# Patient Record
Sex: MALE | Race: WHITE | NOT HISPANIC OR LATINO | Employment: FULL TIME | URBAN - METROPOLITAN AREA
[De-identification: names, ages, dates, MRNs, and addresses within clinical notes are randomized per-mention and may not be internally consistent; named-entity substitution may affect disease eponyms.]

---

## 2017-01-02 ENCOUNTER — ALLSCRIPTS OFFICE VISIT (OUTPATIENT)
Dept: OTHER | Facility: OTHER | Age: 57
End: 2017-01-02

## 2017-02-13 ENCOUNTER — ALLSCRIPTS OFFICE VISIT (OUTPATIENT)
Dept: OTHER | Facility: OTHER | Age: 57
End: 2017-02-13

## 2017-02-13 DIAGNOSIS — M25.551 PAIN IN RIGHT HIP: ICD-10-CM

## 2017-02-14 ENCOUNTER — TRANSCRIBE ORDERS (OUTPATIENT)
Dept: ADMINISTRATIVE | Facility: HOSPITAL | Age: 57
End: 2017-02-14

## 2017-02-14 DIAGNOSIS — M25.551 RIGHT HIP PAIN: Primary | ICD-10-CM

## 2017-02-22 ENCOUNTER — HOSPITAL ENCOUNTER (OUTPATIENT)
Dept: RADIOLOGY | Facility: HOSPITAL | Age: 57
Discharge: HOME | End: 2017-02-22
Attending: RADIOLOGY | Admitting: RADIOLOGY
Payer: COMMERCIAL

## 2017-02-22 ENCOUNTER — HOSPITAL ENCOUNTER (OUTPATIENT)
Dept: RADIOLOGY | Facility: HOSPITAL | Age: 57
Discharge: HOME | End: 2017-02-22
Attending: ORTHOPAEDIC SURGERY
Payer: COMMERCIAL

## 2017-02-22 ENCOUNTER — HOSPITAL ENCOUNTER (OUTPATIENT)
Dept: MRI IMAGING | Facility: HOSPITAL | Age: 57
Discharge: HOME | End: 2017-02-22
Attending: ORTHOPAEDIC SURGERY
Payer: COMMERCIAL

## 2017-02-22 DIAGNOSIS — M25.551 PAIN IN RIGHT HIP: ICD-10-CM

## 2017-02-22 DIAGNOSIS — M25.551 RIGHT HIP PAIN: ICD-10-CM

## 2017-02-22 PROCEDURE — A9585 GADOBUTROL INJECTION: HCPCS | Performed by: ORTHOPAEDIC SURGERY

## 2017-02-22 PROCEDURE — 77002 NEEDLE LOCALIZATION BY XRAY: CPT

## 2017-02-22 PROCEDURE — 73722 MRI JOINT OF LWR EXTR W/DYE: CPT

## 2017-02-22 PROCEDURE — 20610 DRAIN/INJ JOINT/BURSA W/O US: CPT

## 2017-02-22 PROCEDURE — 70030 X-RAY EYE FOR FOREIGN BODY: CPT

## 2017-02-22 RX ADMIN — GADOBUTROL 0.2 ML: 604.72 INJECTION INTRAVENOUS at 14:40

## 2017-02-22 RX ADMIN — IOHEXOL 50 ML: 300 INJECTION, SOLUTION INTRAVENOUS at 14:40

## 2017-02-28 ENCOUNTER — ALLSCRIPTS OFFICE VISIT (OUTPATIENT)
Dept: OTHER | Facility: OTHER | Age: 57
End: 2017-02-28

## 2017-03-08 ENCOUNTER — ALLSCRIPTS OFFICE VISIT (OUTPATIENT)
Dept: OTHER | Facility: OTHER | Age: 57
End: 2017-03-08

## 2017-03-21 ENCOUNTER — ALLSCRIPTS OFFICE VISIT (OUTPATIENT)
Dept: OTHER | Facility: OTHER | Age: 57
End: 2017-03-21

## 2017-04-17 ENCOUNTER — GENERIC CONVERSION - ENCOUNTER (OUTPATIENT)
Dept: OTHER | Facility: OTHER | Age: 57
End: 2017-04-17

## 2017-04-18 ENCOUNTER — GENERIC CONVERSION - ENCOUNTER (OUTPATIENT)
Dept: OTHER | Facility: OTHER | Age: 57
End: 2017-04-18

## 2017-05-02 ENCOUNTER — TRANSCRIBE ORDERS (OUTPATIENT)
Dept: ADMINISTRATIVE | Facility: HOSPITAL | Age: 57
End: 2017-05-02

## 2017-05-02 ENCOUNTER — APPOINTMENT (OUTPATIENT)
Dept: LAB | Facility: HOSPITAL | Age: 57
End: 2017-05-02
Attending: ORTHOPAEDIC SURGERY
Payer: COMMERCIAL

## 2017-05-02 ENCOUNTER — ANESTHESIA EVENT (OUTPATIENT)
Dept: PERIOP | Facility: HOSPITAL | Age: 57
DRG: 470 | End: 2017-05-02
Payer: COMMERCIAL

## 2017-05-02 ENCOUNTER — HOSPITAL ENCOUNTER (OUTPATIENT)
Dept: NON INVASIVE DIAGNOSTICS | Facility: HOSPITAL | Age: 57
Discharge: HOME | End: 2017-05-02
Attending: ORTHOPAEDIC SURGERY
Payer: COMMERCIAL

## 2017-05-02 ENCOUNTER — HOSPITAL ENCOUNTER (OUTPATIENT)
Dept: RADIOLOGY | Facility: HOSPITAL | Age: 57
Discharge: HOME | End: 2017-05-02
Attending: ORTHOPAEDIC SURGERY
Payer: COMMERCIAL

## 2017-05-02 DIAGNOSIS — M16.11 PRIMARY OSTEOARTHRITIS OF RIGHT HIP: Primary | ICD-10-CM

## 2017-05-02 DIAGNOSIS — M16.11 PRIMARY OSTEOARTHRITIS OF RIGHT HIP: ICD-10-CM

## 2017-05-02 LAB
ABO GROUP BLD: NORMAL
ALBUMIN SERPL BCP-MCNC: 4.2 G/DL (ref 3.5–5)
ALP SERPL-CCNC: 57 U/L (ref 46–116)
ALT SERPL W P-5'-P-CCNC: 44 U/L (ref 12–78)
ANION GAP SERPL CALCULATED.3IONS-SCNC: 7 MMOL/L (ref 4–13)
AST SERPL W P-5'-P-CCNC: 22 U/L (ref 5–45)
ATRIAL RATE: 84 BPM
BASOPHILS # BLD AUTO: 0.02 THOUSANDS/ΜL (ref 0–0.1)
BASOPHILS NFR BLD AUTO: 0 % (ref 0–1)
BILIRUB SERPL-MCNC: 0.64 MG/DL (ref 0.2–1)
BILIRUB UR QL STRIP: NEGATIVE
BLD GP AB SCN SERPL QL: NEGATIVE
BUN SERPL-MCNC: 17 MG/DL (ref 5–25)
CALCIUM SERPL-MCNC: 8.9 MG/DL (ref 8.3–10.1)
CHLORIDE SERPL-SCNC: 104 MMOL/L (ref 100–108)
CLARITY UR: CLEAR
CO2 SERPL-SCNC: 29 MMOL/L (ref 21–32)
COLOR UR: YELLOW
CREAT SERPL-MCNC: 1.04 MG/DL (ref 0.6–1.3)
EOSINOPHIL # BLD AUTO: 0.21 THOUSAND/ΜL (ref 0–0.61)
EOSINOPHIL NFR BLD AUTO: 4 % (ref 0–6)
ERYTHROCYTE [DISTWIDTH] IN BLOOD BY AUTOMATED COUNT: 12.6 % (ref 11.6–15.1)
GFR SERPL CREATININE-BSD FRML MDRD: >60 ML/MIN/1.73SQ M
GLUCOSE SERPL-MCNC: 118 MG/DL (ref 65–140)
GLUCOSE UR STRIP-MCNC: NEGATIVE MG/DL
HCT VFR BLD AUTO: 42.1 % (ref 36.5–49.3)
HGB BLD-MCNC: 14.6 G/DL (ref 12–17)
HGB UR QL STRIP.AUTO: NEGATIVE
INR PPP: 1.06 (ref 0.86–1.16)
KETONES UR STRIP-MCNC: NEGATIVE MG/DL
LEUKOCYTE ESTERASE UR QL STRIP: NEGATIVE
LYMPHOCYTES # BLD AUTO: 1.6 THOUSANDS/ΜL (ref 0.6–4.47)
LYMPHOCYTES NFR BLD AUTO: 29 % (ref 14–44)
MCH RBC QN AUTO: 32.3 PG (ref 26.8–34.3)
MCHC RBC AUTO-ENTMCNC: 34.7 G/DL (ref 31.4–37.4)
MCV RBC AUTO: 93 FL (ref 82–98)
MONOCYTES # BLD AUTO: 0.45 THOUSAND/ΜL (ref 0.17–1.22)
MONOCYTES NFR BLD AUTO: 8 % (ref 4–12)
NEUTROPHILS # BLD AUTO: 3.23 THOUSANDS/ΜL (ref 1.85–7.62)
NEUTS SEG NFR BLD AUTO: 59 % (ref 43–75)
NITRITE UR QL STRIP: NEGATIVE
NRBC BLD AUTO-RTO: 0 /100 WBCS
P AXIS: 70 DEGREES
PH UR STRIP.AUTO: 7.5 [PH] (ref 4.5–8)
PLATELET # BLD AUTO: 309 THOUSANDS/UL (ref 149–390)
PMV BLD AUTO: 9.2 FL (ref 8.9–12.7)
POTASSIUM SERPL-SCNC: 3.9 MMOL/L (ref 3.5–5.3)
PR INTERVAL: 162 MS
PROT SERPL-MCNC: 7.6 G/DL (ref 6.4–8.2)
PROT UR STRIP-MCNC: NEGATIVE MG/DL
PROTHROMBIN TIME: 13.8 SECONDS (ref 12.1–14.4)
QRS AXIS: -45 DEGREES
QRSD INTERVAL: 92 MS
QT INTERVAL: 356 MS
QTC INTERVAL: 420 MS
RBC # BLD AUTO: 4.52 MILLION/UL (ref 3.88–5.62)
RH BLD: POSITIVE
SODIUM SERPL-SCNC: 140 MMOL/L (ref 136–145)
SP GR UR STRIP.AUTO: 1.01 (ref 1–1.03)
SPECIMEN EXPIRATION DATE: NORMAL
T WAVE AXIS: 12 DEGREES
UROBILINOGEN UR QL STRIP.AUTO: 0.2 E.U./DL
VENTRICULAR RATE: 84 BPM
WBC # BLD AUTO: 5.51 THOUSAND/UL (ref 4.31–10.16)

## 2017-05-02 PROCEDURE — 86850 RBC ANTIBODY SCREEN: CPT

## 2017-05-02 PROCEDURE — 86900 BLOOD TYPING SEROLOGIC ABO: CPT

## 2017-05-02 PROCEDURE — 71020 HB CHEST X-RAY 2VW FRONTAL&LATL: CPT

## 2017-05-02 PROCEDURE — 80053 COMPREHEN METABOLIC PANEL: CPT

## 2017-05-02 PROCEDURE — 93005 ELECTROCARDIOGRAM TRACING: CPT

## 2017-05-02 PROCEDURE — 86901 BLOOD TYPING SEROLOGIC RH(D): CPT

## 2017-05-02 PROCEDURE — 81003 URINALYSIS AUTO W/O SCOPE: CPT | Performed by: ORTHOPAEDIC SURGERY

## 2017-05-02 PROCEDURE — 85610 PROTHROMBIN TIME: CPT

## 2017-05-02 PROCEDURE — 85025 COMPLETE CBC W/AUTO DIFF WBC: CPT

## 2017-05-02 PROCEDURE — 36415 COLL VENOUS BLD VENIPUNCTURE: CPT

## 2017-05-04 ENCOUNTER — ALLSCRIPTS OFFICE VISIT (OUTPATIENT)
Dept: OTHER | Facility: OTHER | Age: 57
End: 2017-05-04

## 2017-05-17 ENCOUNTER — ANESTHESIA (OUTPATIENT)
Dept: PERIOP | Facility: HOSPITAL | Age: 57
DRG: 470 | End: 2017-05-17
Payer: COMMERCIAL

## 2017-05-17 ENCOUNTER — HOSPITAL ENCOUNTER (OUTPATIENT)
Dept: RADIOLOGY | Facility: HOSPITAL | Age: 57
Setting detail: SURGERY ADMIT
Discharge: HOME | DRG: 470 | End: 2017-05-17
Payer: COMMERCIAL

## 2017-05-17 ENCOUNTER — APPOINTMENT (INPATIENT)
Dept: RADIOLOGY | Facility: HOSPITAL | Age: 57
DRG: 470 | End: 2017-05-17
Payer: COMMERCIAL

## 2017-05-17 ENCOUNTER — HOSPITAL ENCOUNTER (INPATIENT)
Facility: HOSPITAL | Age: 57
LOS: 1 days | Discharge: HOME HEALTH | DRG: 470 | End: 2017-05-18
Attending: ORTHOPAEDIC SURGERY | Admitting: ORTHOPAEDIC SURGERY
Payer: COMMERCIAL

## 2017-05-17 DIAGNOSIS — M16.11 PRIMARY OSTEOARTHRITIS OF RIGHT HIP: ICD-10-CM

## 2017-05-17 DIAGNOSIS — M16.9 OSTEOARTHROSIS, HIP: Primary | ICD-10-CM

## 2017-05-17 LAB
ABO GROUP BLD: NORMAL
BLD GP AB SCN SERPL QL: NEGATIVE
RH BLD: POSITIVE
SPECIMEN EXPIRATION DATE: NORMAL

## 2017-05-17 PROCEDURE — C1776 JOINT DEVICE (IMPLANTABLE): HCPCS | Performed by: ORTHOPAEDIC SURGERY

## 2017-05-17 PROCEDURE — 86901 BLOOD TYPING SEROLOGIC RH(D): CPT | Performed by: ORTHOPAEDIC SURGERY

## 2017-05-17 PROCEDURE — C1713 ANCHOR/SCREW BN/BN,TIS/BN: HCPCS | Performed by: ORTHOPAEDIC SURGERY

## 2017-05-17 PROCEDURE — 73501 X-RAY EXAM HIP UNI 1 VIEW: CPT

## 2017-05-17 PROCEDURE — 97163 PT EVAL HIGH COMPLEX 45 MIN: CPT

## 2017-05-17 PROCEDURE — 0SR902Z REPLACEMENT OF RIGHT HIP JOINT WITH METAL ON POLYETHYLENE SYNTHETIC SUBSTITUTE, OPEN APPROACH: ICD-10-PCS | Performed by: ORTHOPAEDIC SURGERY

## 2017-05-17 PROCEDURE — 97110 THERAPEUTIC EXERCISES: CPT

## 2017-05-17 PROCEDURE — A9270 NON-COVERED ITEM OR SERVICE: HCPCS | Performed by: PHYSICIAN ASSISTANT

## 2017-05-17 PROCEDURE — 86850 RBC ANTIBODY SCREEN: CPT | Performed by: ORTHOPAEDIC SURGERY

## 2017-05-17 PROCEDURE — 94762 N-INVAS EAR/PLS OXIMTRY CONT: CPT

## 2017-05-17 PROCEDURE — G8979 MOBILITY GOAL STATUS: HCPCS

## 2017-05-17 PROCEDURE — G8978 MOBILITY CURRENT STATUS: HCPCS

## 2017-05-17 PROCEDURE — 86900 BLOOD TYPING SEROLOGIC ABO: CPT | Performed by: ORTHOPAEDIC SURGERY

## 2017-05-17 DEVICE — PINNACLE POROCOAT ACETABULAR SHELL SECTOR II 54MM OD
Type: IMPLANTABLE DEVICE | Site: HIP | Status: FUNCTIONAL
Brand: PINNACLE POROCOAT

## 2017-05-17 DEVICE — PINNACLE HIP SOLUTIONS ALTRX POLYETHYLENE ACETABULAR LINER NEUTRAL 36MM ID 54MM OD
Type: IMPLANTABLE DEVICE | Site: HIP | Status: FUNCTIONAL
Brand: PINNACLE ALTRX

## 2017-05-17 DEVICE — PINNACLE CANCELLOUS BONE SCREW 6.5MM X 20MM
Type: IMPLANTABLE DEVICE | Site: HIP | Status: FUNCTIONAL
Brand: PINNACLE

## 2017-05-17 DEVICE — CORAIL HIP SYSTEM CEMENTLESS FEMORAL STEM 12/14 AMT 135 DEGREES KA SIZE 12 HA COATED STANDARD COLLAR
Type: IMPLANTABLE DEVICE | Site: HIP | Status: FUNCTIONAL
Brand: CORAIL

## 2017-05-17 DEVICE — BIOLOX DELTA CERAMIC FEMORAL HEAD +1.5 36MM DIA 12/14 TAPER
Type: IMPLANTABLE DEVICE | Site: HIP | Status: FUNCTIONAL
Brand: BIOLOX DELTA

## 2017-05-17 RX ORDER — MORPHINE SULFATE 0.5 MG/ML
INJECTION, SOLUTION EPIDURAL; INTRATHECAL; INTRAVENOUS AS NEEDED
Status: DISCONTINUED | OUTPATIENT
Start: 2017-05-17 | End: 2017-05-17 | Stop reason: SURG

## 2017-05-17 RX ORDER — ALBUTEROL SULFATE 2.5 MG/3ML
2.5 SOLUTION RESPIRATORY (INHALATION) ONCE AS NEEDED
Status: DISCONTINUED | OUTPATIENT
Start: 2017-05-17 | End: 2017-05-17 | Stop reason: HOSPADM

## 2017-05-17 RX ORDER — DIPHENHYDRAMINE HYDROCHLORIDE 50 MG/ML
25 INJECTION INTRAMUSCULAR; INTRAVENOUS EVERY 6 HOURS PRN
Status: ACTIVE | OUTPATIENT
Start: 2017-05-17 | End: 2017-05-18

## 2017-05-17 RX ORDER — SENNOSIDES 8.6 MG
1 TABLET ORAL DAILY
Status: DISCONTINUED | OUTPATIENT
Start: 2017-05-17 | End: 2017-05-18 | Stop reason: HOSPADM

## 2017-05-17 RX ORDER — LOSARTAN POTASSIUM 50 MG/1
100 TABLET ORAL DAILY
Status: DISCONTINUED | OUTPATIENT
Start: 2017-05-18 | End: 2017-05-18 | Stop reason: HOSPADM

## 2017-05-17 RX ORDER — PANTOPRAZOLE SODIUM 40 MG/1
40 TABLET, DELAYED RELEASE ORAL
Status: DISCONTINUED | OUTPATIENT
Start: 2017-05-18 | End: 2017-05-18 | Stop reason: HOSPADM

## 2017-05-17 RX ORDER — KETOROLAC TROMETHAMINE 30 MG/ML
30 INJECTION, SOLUTION INTRAMUSCULAR; INTRAVENOUS EVERY 6 HOURS PRN
Status: DISPENSED | OUTPATIENT
Start: 2017-05-17 | End: 2017-05-18

## 2017-05-17 RX ORDER — ONDANSETRON 2 MG/ML
INJECTION INTRAMUSCULAR; INTRAVENOUS AS NEEDED
Status: DISCONTINUED | OUTPATIENT
Start: 2017-05-17 | End: 2017-05-17 | Stop reason: SURG

## 2017-05-17 RX ORDER — FENTANYL CITRATE/PF 50 MCG/ML
50 SYRINGE (ML) INJECTION
Status: DISCONTINUED | OUTPATIENT
Start: 2017-05-17 | End: 2017-05-17 | Stop reason: HOSPADM

## 2017-05-17 RX ORDER — ASPIRIN 81 MG/1
81 TABLET, CHEWABLE ORAL 2 TIMES DAILY
Status: DISCONTINUED | OUTPATIENT
Start: 2017-05-17 | End: 2017-05-18 | Stop reason: HOSPADM

## 2017-05-17 RX ORDER — ONDANSETRON 2 MG/ML
4 INJECTION INTRAMUSCULAR; INTRAVENOUS EVERY 4 HOURS PRN
Status: ACTIVE | OUTPATIENT
Start: 2017-05-17 | End: 2017-05-18

## 2017-05-17 RX ORDER — TRANEXAMIC ACID 100 MG/ML
INJECTION, SOLUTION INTRAVENOUS AS NEEDED
Status: DISCONTINUED | OUTPATIENT
Start: 2017-05-17 | End: 2017-05-17 | Stop reason: SURG

## 2017-05-17 RX ORDER — TRAMADOL HYDROCHLORIDE 50 MG/1
50 TABLET ORAL EVERY 6 HOURS PRN
Status: DISCONTINUED | OUTPATIENT
Start: 2017-05-17 | End: 2017-05-18 | Stop reason: HOSPADM

## 2017-05-17 RX ORDER — SODIUM CHLORIDE, SODIUM LACTATE, POTASSIUM CHLORIDE, CALCIUM CHLORIDE 600; 310; 30; 20 MG/100ML; MG/100ML; MG/100ML; MG/100ML
125 INJECTION, SOLUTION INTRAVENOUS CONTINUOUS
Status: DISCONTINUED | OUTPATIENT
Start: 2017-05-17 | End: 2017-05-18 | Stop reason: HOSPADM

## 2017-05-17 RX ORDER — METOCLOPRAMIDE HYDROCHLORIDE 5 MG/ML
5 INJECTION INTRAMUSCULAR; INTRAVENOUS EVERY 6 HOURS PRN
Status: ACTIVE | OUTPATIENT
Start: 2017-05-17 | End: 2017-05-18

## 2017-05-17 RX ORDER — BUPIVACAINE HYDROCHLORIDE 7.5 MG/ML
INJECTION, SOLUTION INTRASPINAL AS NEEDED
Status: DISCONTINUED | OUTPATIENT
Start: 2017-05-17 | End: 2017-05-17 | Stop reason: SURG

## 2017-05-17 RX ORDER — CALCIUM CARBONATE 200(500)MG
1000 TABLET,CHEWABLE ORAL DAILY PRN
Status: DISCONTINUED | OUTPATIENT
Start: 2017-05-17 | End: 2017-05-18 | Stop reason: HOSPADM

## 2017-05-17 RX ORDER — MIDAZOLAM HYDROCHLORIDE 1 MG/ML
INJECTION INTRAMUSCULAR; INTRAVENOUS AS NEEDED
Status: DISCONTINUED | OUTPATIENT
Start: 2017-05-17 | End: 2017-05-17 | Stop reason: SURG

## 2017-05-17 RX ORDER — DOCUSATE SODIUM 100 MG/1
100 CAPSULE, LIQUID FILLED ORAL 2 TIMES DAILY
Status: DISCONTINUED | OUTPATIENT
Start: 2017-05-17 | End: 2017-05-18 | Stop reason: HOSPADM

## 2017-05-17 RX ORDER — PROPOFOL 10 MG/ML
INJECTION, EMULSION INTRAVENOUS CONTINUOUS PRN
Status: DISCONTINUED | OUTPATIENT
Start: 2017-05-17 | End: 2017-05-17 | Stop reason: SURG

## 2017-05-17 RX ORDER — AMLODIPINE BESYLATE 5 MG/1
5 TABLET ORAL DAILY
Status: DISCONTINUED | OUTPATIENT
Start: 2017-05-18 | End: 2017-05-18 | Stop reason: HOSPADM

## 2017-05-17 RX ORDER — PANTOPRAZOLE SODIUM 40 MG/1
40 TABLET, DELAYED RELEASE ORAL
Status: DISCONTINUED | OUTPATIENT
Start: 2017-05-18 | End: 2017-05-17 | Stop reason: SDUPTHER

## 2017-05-17 RX ORDER — MAGNESIUM HYDROXIDE 1200 MG/15ML
LIQUID ORAL AS NEEDED
Status: DISCONTINUED | OUTPATIENT
Start: 2017-05-17 | End: 2017-05-17 | Stop reason: HOSPADM

## 2017-05-17 RX ORDER — NALBUPHINE HCL 10 MG/ML
5 AMPUL (ML) INJECTION
Status: ACTIVE | OUTPATIENT
Start: 2017-05-17 | End: 2017-05-18

## 2017-05-17 RX ORDER — ACETAMINOPHEN 325 MG/1
650 TABLET ORAL EVERY 6 HOURS PRN
Status: DISCONTINUED | OUTPATIENT
Start: 2017-05-17 | End: 2017-05-18 | Stop reason: HOSPADM

## 2017-05-17 RX ORDER — ONDANSETRON 2 MG/ML
4 INJECTION INTRAMUSCULAR; INTRAVENOUS EVERY 6 HOURS PRN
Status: DISCONTINUED | OUTPATIENT
Start: 2017-05-18 | End: 2017-05-18 | Stop reason: HOSPADM

## 2017-05-17 RX ORDER — OXYCODONE HYDROCHLORIDE 10 MG/1
10 TABLET ORAL EVERY 4 HOURS PRN
Status: DISCONTINUED | OUTPATIENT
Start: 2017-05-18 | End: 2017-05-18 | Stop reason: HOSPADM

## 2017-05-17 RX ORDER — AMLODIPINE BESYLATE 5 MG/1
5 TABLET ORAL DAILY
COMMUNITY
End: 2017-11-15

## 2017-05-17 RX ORDER — SODIUM CHLORIDE 9 MG/ML
125 INJECTION, SOLUTION INTRAVENOUS CONTINUOUS
Status: DISCONTINUED | OUTPATIENT
Start: 2017-05-17 | End: 2017-05-18 | Stop reason: HOSPADM

## 2017-05-17 RX ORDER — CELECOXIB 100 MG/1
100 CAPSULE ORAL 2 TIMES DAILY
Status: DISCONTINUED | OUTPATIENT
Start: 2017-05-17 | End: 2017-05-18 | Stop reason: HOSPADM

## 2017-05-17 RX ORDER — FENTANYL CITRATE 50 UG/ML
INJECTION, SOLUTION INTRAMUSCULAR; INTRAVENOUS AS NEEDED
Status: DISCONTINUED | OUTPATIENT
Start: 2017-05-17 | End: 2017-05-17 | Stop reason: SURG

## 2017-05-17 RX ORDER — VANCOMYCIN HYDROCHLORIDE 1 G/200ML
1000 INJECTION, SOLUTION INTRAVENOUS
Status: DISCONTINUED | OUTPATIENT
Start: 2017-05-17 | End: 2017-05-17 | Stop reason: HOSPADM

## 2017-05-17 RX ORDER — MEPERIDINE HYDROCHLORIDE 50 MG/ML
12.5 INJECTION INTRAMUSCULAR; INTRAVENOUS; SUBCUTANEOUS AS NEEDED
Status: DISCONTINUED | OUTPATIENT
Start: 2017-05-17 | End: 2017-05-17 | Stop reason: HOSPADM

## 2017-05-17 RX ORDER — PROPOFOL 10 MG/ML
INJECTION, EMULSION INTRAVENOUS AS NEEDED
Status: DISCONTINUED | OUTPATIENT
Start: 2017-05-17 | End: 2017-05-17 | Stop reason: SURG

## 2017-05-17 RX ORDER — OXYCODONE HYDROCHLORIDE 5 MG/1
5 TABLET ORAL EVERY 4 HOURS PRN
Status: DISCONTINUED | OUTPATIENT
Start: 2017-05-18 | End: 2017-05-18 | Stop reason: HOSPADM

## 2017-05-17 RX ADMIN — CEFAZOLIN SODIUM 1000 MG: 1 SOLUTION INTRAVENOUS at 19:30

## 2017-05-17 RX ADMIN — ASPIRIN 81 MG: 81 TABLET, CHEWABLE ORAL at 17:49

## 2017-05-17 RX ADMIN — MORPHINE SULFATE 0.15 MG: 0.5 INJECTION, SOLUTION EPIDURAL; INTRATHECAL; INTRAVENOUS at 10:56

## 2017-05-17 RX ADMIN — CELECOXIB 100 MG: 100 CAPSULE ORAL at 17:49

## 2017-05-17 RX ADMIN — SODIUM CHLORIDE: 0.9 INJECTION, SOLUTION INTRAVENOUS at 11:50

## 2017-05-17 RX ADMIN — VANCOMYCIN HYDROCHLORIDE 1000 MG: 1 INJECTION, SOLUTION INTRAVENOUS at 11:05

## 2017-05-17 RX ADMIN — SODIUM CHLORIDE, SODIUM LACTATE, POTASSIUM CHLORIDE, AND CALCIUM CHLORIDE 125 ML/HR: .6; .31; .03; .02 INJECTION, SOLUTION INTRAVENOUS at 22:40

## 2017-05-17 RX ADMIN — TRANEXAMIC ACID 1000 MG: 100 INJECTION, SOLUTION INTRAVENOUS at 12:09

## 2017-05-17 RX ADMIN — PROPOFOL 100 MCG/KG/MIN: 10 INJECTION, EMULSION INTRAVENOUS at 11:15

## 2017-05-17 RX ADMIN — FENTANYL CITRATE 100 MCG: 50 INJECTION, SOLUTION INTRAMUSCULAR; INTRAVENOUS at 10:46

## 2017-05-17 RX ADMIN — SODIUM CHLORIDE, SODIUM LACTATE, POTASSIUM CHLORIDE, AND CALCIUM CHLORIDE 125 ML/HR: .6; .31; .03; .02 INJECTION, SOLUTION INTRAVENOUS at 13:22

## 2017-05-17 RX ADMIN — CEFAZOLIN SODIUM 2000 MG: 2 SOLUTION INTRAVENOUS at 10:44

## 2017-05-17 RX ADMIN — MIDAZOLAM HYDROCHLORIDE 2 MG: 1 INJECTION, SOLUTION INTRAMUSCULAR; INTRAVENOUS at 11:01

## 2017-05-17 RX ADMIN — TRANEXAMIC ACID 1000 MG: 100 INJECTION, SOLUTION INTRAVENOUS at 11:10

## 2017-05-17 RX ADMIN — ONDANSETRON HYDROCHLORIDE 4 MG: 2 INJECTION, SOLUTION INTRAVENOUS at 12:36

## 2017-05-17 RX ADMIN — SODIUM CHLORIDE 125 ML/HR: 0.9 INJECTION, SOLUTION INTRAVENOUS at 08:45

## 2017-05-17 RX ADMIN — TRAMADOL HYDROCHLORIDE 50 MG: 50 TABLET, COATED ORAL at 22:40

## 2017-05-17 RX ADMIN — DEXAMETHASONE SODIUM PHOSPHATE 4 MG: 10 INJECTION INTRAMUSCULAR; INTRAVENOUS at 11:41

## 2017-05-17 RX ADMIN — MIDAZOLAM HYDROCHLORIDE 2 MG: 1 INJECTION, SOLUTION INTRAMUSCULAR; INTRAVENOUS at 10:44

## 2017-05-17 RX ADMIN — BUPIVACAINE HYDROCHLORIDE IN DEXTROSE 1.8 ML: 7.5 INJECTION, SOLUTION SUBARACHNOID at 10:56

## 2017-05-17 RX ADMIN — PROPOFOL 30 MG: 10 INJECTION, EMULSION INTRAVENOUS at 11:17

## 2017-05-17 RX ADMIN — DOCUSATE SODIUM 100 MG: 100 CAPSULE, LIQUID FILLED ORAL at 17:49

## 2017-05-18 VITALS
BODY MASS INDEX: 24.79 KG/M2 | SYSTOLIC BLOOD PRESSURE: 143 MMHG | TEMPERATURE: 97.4 F | OXYGEN SATURATION: 98 % | HEART RATE: 81 BPM | WEIGHT: 173.19 LBS | DIASTOLIC BLOOD PRESSURE: 89 MMHG | RESPIRATION RATE: 16 BRPM | HEIGHT: 70 IN

## 2017-05-18 LAB
ERYTHROCYTE [DISTWIDTH] IN BLOOD BY AUTOMATED COUNT: 12.4 % (ref 11.6–15.1)
HCT VFR BLD AUTO: 37.3 % (ref 36.5–49.3)
HGB BLD-MCNC: 13 G/DL (ref 12–17)
MCH RBC QN AUTO: 31.9 PG (ref 26.8–34.3)
MCHC RBC AUTO-ENTMCNC: 34.9 G/DL (ref 31.4–37.4)
MCV RBC AUTO: 92 FL (ref 82–98)
PLATELET # BLD AUTO: 304 THOUSANDS/UL (ref 149–390)
PMV BLD AUTO: 9.4 FL (ref 8.9–12.7)
RBC # BLD AUTO: 4.07 MILLION/UL (ref 3.88–5.62)
WBC # BLD AUTO: 14.85 THOUSAND/UL (ref 4.31–10.16)

## 2017-05-18 PROCEDURE — A9270 NON-COVERED ITEM OR SERVICE: HCPCS | Performed by: PHYSICIAN ASSISTANT

## 2017-05-18 PROCEDURE — A9270 NON-COVERED ITEM OR SERVICE: HCPCS | Performed by: INTERNAL MEDICINE

## 2017-05-18 PROCEDURE — 97110 THERAPEUTIC EXERCISES: CPT

## 2017-05-18 PROCEDURE — 97530 THERAPEUTIC ACTIVITIES: CPT

## 2017-05-18 PROCEDURE — 97116 GAIT TRAINING THERAPY: CPT

## 2017-05-18 PROCEDURE — 85027 COMPLETE CBC AUTOMATED: CPT | Performed by: INTERNAL MEDICINE

## 2017-05-18 RX ORDER — ASPIRIN 81 MG/1
81 TABLET, CHEWABLE ORAL 2 TIMES DAILY
Qty: 60 TABLET | Refills: 0 | Status: SHIPPED | OUTPATIENT
Start: 2017-05-18 | End: 2017-11-15

## 2017-05-18 RX ORDER — CELECOXIB 100 MG/1
100 CAPSULE ORAL 2 TIMES DAILY
Qty: 60 CAPSULE | Refills: 0 | Status: SHIPPED | OUTPATIENT
Start: 2017-05-18 | End: 2017-11-15

## 2017-05-18 RX ORDER — OXYCODONE HYDROCHLORIDE 5 MG/1
5 TABLET ORAL EVERY 4 HOURS PRN
Qty: 30 TABLET | Refills: 0 | Status: SHIPPED | OUTPATIENT
Start: 2017-05-18 | End: 2017-05-28

## 2017-05-18 RX ORDER — OXYCODONE HYDROCHLORIDE 10 MG/1
10 TABLET ORAL EVERY 4 HOURS PRN
Qty: 60 TABLET | Refills: 0 | Status: SHIPPED | OUTPATIENT
Start: 2017-05-18 | End: 2017-05-28

## 2017-05-18 RX ADMIN — CEFAZOLIN SODIUM 1000 MG: 1 SOLUTION INTRAVENOUS at 02:10

## 2017-05-18 RX ADMIN — LOSARTAN POTASSIUM 100 MG: 50 TABLET, FILM COATED ORAL at 09:00

## 2017-05-18 RX ADMIN — KETOROLAC TROMETHAMINE 30 MG: 30 INJECTION, SOLUTION INTRAMUSCULAR at 09:40

## 2017-05-18 RX ADMIN — PANTOPRAZOLE SODIUM 40 MG: 40 TABLET, DELAYED RELEASE ORAL at 06:05

## 2017-05-18 RX ADMIN — AMLODIPINE BESYLATE 5 MG: 5 TABLET ORAL at 09:00

## 2017-05-18 RX ADMIN — KETOROLAC TROMETHAMINE 30 MG: 30 INJECTION, SOLUTION INTRAMUSCULAR at 02:10

## 2017-05-18 RX ADMIN — OXYCODONE HYDROCHLORIDE 10 MG: 10 TABLET ORAL at 16:07

## 2017-05-18 RX ADMIN — ASPIRIN 81 MG: 81 TABLET, CHEWABLE ORAL at 09:00

## 2017-05-18 RX ADMIN — Medication 1 TABLET: at 08:59

## 2017-05-18 RX ADMIN — SENNOSIDES 8.6 MG: 8.6 TABLET, FILM COATED ORAL at 08:59

## 2017-05-18 RX ADMIN — CELECOXIB 100 MG: 100 CAPSULE ORAL at 09:00

## 2017-05-18 RX ADMIN — DOCUSATE SODIUM 100 MG: 100 CAPSULE, LIQUID FILLED ORAL at 09:00

## 2017-06-07 ENCOUNTER — GENERIC CONVERSION - ENCOUNTER (OUTPATIENT)
Dept: OTHER | Facility: OTHER | Age: 57
End: 2017-06-07

## 2017-07-03 ENCOUNTER — GENERIC CONVERSION - ENCOUNTER (OUTPATIENT)
Dept: OTHER | Facility: OTHER | Age: 57
End: 2017-07-03

## 2017-08-08 DIAGNOSIS — M54.30 SCIATICA: ICD-10-CM

## 2017-08-08 DIAGNOSIS — M25.551 PAIN IN RIGHT HIP: ICD-10-CM

## 2017-08-08 DIAGNOSIS — M16.11 PRIMARY OSTEOARTHRITIS OF RIGHT HIP: ICD-10-CM

## 2017-08-11 ENCOUNTER — GENERIC CONVERSION - ENCOUNTER (OUTPATIENT)
Dept: OTHER | Facility: OTHER | Age: 57
End: 2017-08-11

## 2017-08-17 ENCOUNTER — TRANSCRIBE ORDERS (OUTPATIENT)
Dept: ADMINISTRATIVE | Facility: HOSPITAL | Age: 57
End: 2017-08-17

## 2017-08-17 DIAGNOSIS — M16.12 PRIMARY OSTEOARTHRITIS OF LEFT HIP: Primary | ICD-10-CM

## 2017-09-12 ENCOUNTER — GENERIC CONVERSION - ENCOUNTER (OUTPATIENT)
Dept: OTHER | Facility: OTHER | Age: 57
End: 2017-09-12

## 2017-10-24 ENCOUNTER — LAB REQUISITION (OUTPATIENT)
Dept: LAB | Facility: HOSPITAL | Age: 57
End: 2017-10-24
Payer: COMMERCIAL

## 2017-10-24 ENCOUNTER — ALLSCRIPTS OFFICE VISIT (OUTPATIENT)
Dept: OTHER | Facility: OTHER | Age: 57
End: 2017-10-24

## 2017-10-24 DIAGNOSIS — M54.50 LOW BACK PAIN: ICD-10-CM

## 2017-10-24 DIAGNOSIS — M16.11 PRIMARY OSTEOARTHRITIS OF RIGHT HIP: ICD-10-CM

## 2017-10-24 DIAGNOSIS — M16.12 PRIMARY OSTEOARTHRITIS OF LEFT HIP: ICD-10-CM

## 2017-10-24 DIAGNOSIS — M25.50 PAIN IN JOINT: ICD-10-CM

## 2017-10-24 PROCEDURE — 86618 LYME DISEASE ANTIBODY: CPT | Performed by: FAMILY MEDICINE

## 2017-10-24 PROCEDURE — 86038 ANTINUCLEAR ANTIBODIES: CPT | Performed by: FAMILY MEDICINE

## 2017-10-24 PROCEDURE — 86140 C-REACTIVE PROTEIN: CPT | Performed by: FAMILY MEDICINE

## 2017-10-24 PROCEDURE — 85652 RBC SED RATE AUTOMATED: CPT | Performed by: FAMILY MEDICINE

## 2017-10-24 PROCEDURE — 86430 RHEUMATOID FACTOR TEST QUAL: CPT | Performed by: FAMILY MEDICINE

## 2017-10-25 LAB
CRP SERPL QL: <3 MG/L
ERYTHROCYTE [SEDIMENTATION RATE] IN BLOOD: 8 MM/HOUR (ref 0–10)
RHEUMATOID FACT SER QL LA: NEGATIVE

## 2017-10-26 LAB
B BURGDOR IGG SER IA-ACNC: 0.3
B BURGDOR IGM SER IA-ACNC: 0.13

## 2017-10-26 NOTE — PROGRESS NOTES
Assessment  1  Diffuse arthralgia (719 40) (M25 50)    Plan  Chronic low back pain, Diffuse arthralgia, Primary localized osteoarthritis of left hip,  Primary localized osteoarthritis of right hip    · (1) CAROLANN SCREEN W/REFLEX TO TITER/PATTERN; Status: In Progress - Specimen/Data  Collected;   Done: 10YUC1938   · (1) C-REACTIVE PROTEIN; Status: In Progress - Specimen/Data Collected;   Done:  60HJU5990   · (1) LYME ANTIBODY PROFILE W/REFLEX TO WESTERN BLOT; Status: In Progress -  Specimen/Data Collected;   Done: 21HCR5454   · (1) RHEUMATOID FACTOR SCREEN; Status:Resulted - Requires Verification;   Done:  87OZL2481 07:07PM   · (1) SED RATE; Status: In Progress - Specimen/Data Collected;   Done: 78EEX5079  Diffuse arthralgia    · Routine Venipuncture - POC; Status:Complete;   Done: 09OTA5924 07:08PM  Need for immunization against influenza    · Stop: Fluzone Quadrivalent Intramuscular Suspension    Discussion/Summary    Patient is a 49-year-old maleDiffuse arthralgias -unclear as to the exact etiology of the symptoms today  He was advised that if symptoms may likely be orthopedic versus possible infectious  However he does not recall a tick bite  He states that he isn't in wooded areas frequently  He denies any visual signs of a year the migraines rash  At this time, check blood work to assess for other causes for his orthopedic complaints  Chief Complaint  Pt c/o shoulder and finger aches, tingling in head area, and low energy  Pt states he would like to be tested for Lyme and discuss what other possibilities the sxs can be from since no tick was found  History of Present Illness  HPI: Patient is a 49-year-old male presents today with a CC of diffuse arthralgias  He states that he mainly notices joint pain in shoulders, fingers, as well as his back  He has been having associated symptoms of neck pain as well as low energy/fatigue  He states that he believes his symptoms have been going on for over 6 months   He denies any specific cause or injury for a symptoms  He states that he would like testing for Lyme disease  Review of Systems    Constitutional: not feeling poorly-- and-- not feeling tired  ENT: no sore throat-- and-- no nasal discharge  Cardiovascular: no chest pain-- and-- no palpitations  Respiratory: no cough-- and-- no shortness of breath during exertion  Gastrointestinal: no nausea-- and-- no diarrhea  Genitourinary: no dysuria-- and-- no nocturia  Musculoskeletal: arthralgias, but-- no myalgias  Integumentary: no rashes-- and-- no skin lesions  Neurological: no numbness-- and-- no dizziness  Active Problems  1  Acid reflux (530 81) (K21 9)   2  Acute gastritis (535 00) (K29 00)   3  Benign essential hypertension (401 1) (I10)   4  Chronic low back pain (724 2,338 29) (M54 5,G89 29)   5  Degenerative tear of acetabular labrum of right hip (715 95) (M16 11)   6  Enlarged prostate (600 00) (N40 0)   7  Esophageal reflux (530 81) (K21 9)   8  Osteoarthritis of right hip (715 95) (M16 11)   9  Pain of right hip joint (719 45) (M25 551)   10  Preop examination (V72 84) (Z01 818)   11  Primary localized osteoarthritis of left hip (715 15) (M16 12)   12  Primary localized osteoarthritis of right hip (715 15) (M16 11)   13  Sciatica (724 3) (M54 30)   14  Vitamin D deficiency (268 9) (E55 9)    Past Medical History  1  History of Acute pain (338 19) (R52)   2  History of Bilateral hip pain (719 45) (M25 551,M25 552)   3  History of Dysphagia (787 20) (R13 10)   4  History of Encounter for screening colonoscopy (V76 51) (Z12 11)   5  History of Fatigue, unspecified type (780 79) (R53 83)   6  History of Foot pain, unspecified laterality   7  History of Groin pain (789 09) (R10 30)   8  History of abdominal pain (V13 89) (Z87 898)   9  History of constipation (V12 79) (Z87 19)   10  History of dermatitis (V13 3) (Z87 2)   11  History of esophageal reflux (V12 79) (Z87 19)   12   History of low back pain (V13 59) (Z87 39)   13  History of Lipid screening (V77 91) (Z13 220)   14  History of Neck pain (723 1) (M54 2)   15  History of Need for immunization against influenza (V04 81) (Z23)   16  History of Screening for prostate cancer (V76 44) (Z12 5)   17  History of Skin lesion (709 9) (L98 9)   18  History of Special screening examination for neoplasm of prostate (V76 44) (Z12 5)   19  History of Urinary frequency (788 41) (R35 0)  Active Problems And Past Medical History Reviewed: The active problems and past medical history were reviewed and updated today  Family History  Mother    1  Family history of Hypertension  Father    2  Family history of Hypertension  Sister    3  Family history of Hypertension  Brother    4  Family history of Diabetes   5  Family history of Hypertension  Family History Reviewed: The family history was reviewed and updated today  Social History   · Former smoker (X89 86) (K79 831)  The social history was reviewed and updated today  The social history was reviewed and is unchanged  Surgical History  Surgical History Reviewed: The surgical history was reviewed and updated today  Current Meds   1  Cyclobenzaprine HCl - 10 MG Oral Tablet; TAKE 1 TABLET 3 TIMES DAILY AS NEEDED; Therapy: 26Uqy8738 to (Evaluate:26Apr2017)  Requested for: 99Por7361; Last   Rx:85Txi2960 Ordered   2  Losartan Potassium 100 MG Oral Tablet; TAKE 1 TABLET DAILY (NEED   APPOINTMENT); Therapy: 49QMN6528 to (Last VJ:53WQL0094)  Requested for: 24Oct2017 Ordered   3  Naproxen 500 MG Oral Tablet; TAKE 1 TABLET Every twelve hours PRN; Therapy: 97AVW5441 to Recorded    The medication list was reviewed and updated today  Allergies  1   No Known Drug Allergies    Vitals   Recorded: 26QPF8633 06:23PM   Temperature 98 1 F, Tympanic   Heart Rate 94   Pulse Quality Normal   Respiration Quality Normal   Respiration 15   Systolic 311, LUE, Sitting   Diastolic 90, LUE, Sitting   Height 5 ft 10 in   Weight 186 lb 6 oz   BMI Calculated 26 74   BSA Calculated 2 03   O2 Saturation 96, RA   Pain Scale 0     Physical Exam    Constitutional   General appearance: No acute distress, well appearing and well nourished  Eyes   Conjunctiva and lids: No swelling, erythema, or discharge  Pupils and irises: Equal, round and reactive to light  Ears, Nose, Mouth, and Throat   External inspection of ears and nose: Normal     Nasal mucosa, septum, and turbinates: Normal without edema or erythema  Oropharynx: Normal with no erythema, edema, exudate or lesions  Pulmonary   Respiratory effort: No increased work of breathing or signs of respiratory distress  Auscultation of lungs: Clear to auscultation, equal breath sounds bilaterally, no wheezes, no rales, no rhonci  Cardiovascular   Auscultation of heart: Normal rate and rhythm, normal S1 and S2, without murmurs  Examination of extremities for edema and/or varicosities: Normal     Abdomen   Abdomen: Non-tender, no masses  Liver and spleen: No hepatomegaly or splenomegaly  Lymphatic   Palpation of lymph nodes in neck: No lymphadenopathy  Musculoskeletal   Gait and station: Normal     Inspection/palpation of joints, bones, and muscles: Normal     Skin   Skin and subcutaneous tissue: Normal without rashes or lesions  Neurologic   Cranial nerves: Cranial nerves 2-12 intact  Sensation: No sensory loss  Psychiatric   Orientation to person, place and time: Normal     Mood and affect: Normal          Results/Data  (1) RHEUMATOID FACTOR SCREEN 24Oct2017 07:07PM Chevia Parcel Order Number: [id number]     Test Name Result Flag Reference   RHEUMATOID FACTOR Negative  Negative       Future Appointments    Date/Time Provider Specialty Site   11/20/2017 06:00 PM Yadira Petersen DO [address]     Signatures   Electronically signed by :  Summer De DO; Oct 25 2017  8:36AM EST (Author)

## 2017-10-27 ENCOUNTER — GENERIC CONVERSION - ENCOUNTER (OUTPATIENT)
Dept: OTHER | Facility: OTHER | Age: 57
End: 2017-10-27

## 2017-10-27 LAB — RYE IGE QN: NEGATIVE

## 2017-10-30 ENCOUNTER — GENERIC CONVERSION - ENCOUNTER (OUTPATIENT)
Dept: OTHER | Facility: OTHER | Age: 57
End: 2017-10-30

## 2017-11-14 ENCOUNTER — ANESTHESIA EVENT (OUTPATIENT)
Dept: PERIOP | Facility: HOSPITAL | Age: 57
DRG: 470 | End: 2017-11-14
Payer: COMMERCIAL

## 2017-11-14 RX ORDER — SODIUM CHLORIDE 9 MG/ML
125 INJECTION, SOLUTION INTRAVENOUS CONTINUOUS
Status: CANCELLED | OUTPATIENT
Start: 2017-12-01

## 2017-11-15 ENCOUNTER — APPOINTMENT (OUTPATIENT)
Dept: LAB | Facility: HOSPITAL | Age: 57
End: 2017-11-15
Attending: ORTHOPAEDIC SURGERY
Payer: COMMERCIAL

## 2017-11-15 ENCOUNTER — TRANSCRIBE ORDERS (OUTPATIENT)
Dept: ADMINISTRATIVE | Facility: HOSPITAL | Age: 57
End: 2017-11-15

## 2017-11-15 ENCOUNTER — APPOINTMENT (OUTPATIENT)
Dept: PREADMISSION TESTING | Facility: HOSPITAL | Age: 57
End: 2017-11-15
Payer: COMMERCIAL

## 2017-11-15 VITALS
SYSTOLIC BLOOD PRESSURE: 140 MMHG | BODY MASS INDEX: 26.57 KG/M2 | HEIGHT: 70 IN | WEIGHT: 185.6 LBS | DIASTOLIC BLOOD PRESSURE: 90 MMHG | TEMPERATURE: 97.8 F | HEART RATE: 87 BPM | RESPIRATION RATE: 16 BRPM

## 2017-11-15 DIAGNOSIS — Z01.818 PREOP EXAMINATION: ICD-10-CM

## 2017-11-15 DIAGNOSIS — Z01.818 PREOP EXAMINATION: Primary | ICD-10-CM

## 2017-11-15 DIAGNOSIS — M16.12 PRIMARY OSTEOARTHRITIS OF LEFT HIP: ICD-10-CM

## 2017-11-15 LAB
ALBUMIN SERPL BCP-MCNC: 4.6 G/DL (ref 3.5–5)
ALP SERPL-CCNC: 57 U/L (ref 46–116)
ALT SERPL W P-5'-P-CCNC: 53 U/L (ref 12–78)
ANION GAP SERPL CALCULATED.3IONS-SCNC: 10 MMOL/L (ref 4–13)
AST SERPL W P-5'-P-CCNC: 29 U/L (ref 5–45)
BASOPHILS # BLD AUTO: 0.04 THOUSANDS/ΜL (ref 0–0.1)
BASOPHILS NFR BLD AUTO: 1 % (ref 0–1)
BILIRUB SERPL-MCNC: 0.52 MG/DL (ref 0.2–1)
BILIRUB UR QL STRIP: NEGATIVE
BUN SERPL-MCNC: 20 MG/DL (ref 5–25)
CALCIUM SERPL-MCNC: 9.4 MG/DL (ref 8.3–10.1)
CHLORIDE SERPL-SCNC: 103 MMOL/L (ref 100–108)
CLARITY UR: CLEAR
CO2 SERPL-SCNC: 29 MMOL/L (ref 21–32)
COLOR UR: YELLOW
CREAT SERPL-MCNC: 1.12 MG/DL (ref 0.6–1.3)
EOSINOPHIL # BLD AUTO: 0.3 THOUSAND/ΜL (ref 0–0.61)
EOSINOPHIL NFR BLD AUTO: 4 % (ref 0–6)
ERYTHROCYTE [DISTWIDTH] IN BLOOD BY AUTOMATED COUNT: 12.8 % (ref 11.6–15.1)
GFR SERPL CREATININE-BSD FRML MDRD: 72 ML/MIN/1.73SQ M
GLUCOSE SERPL-MCNC: 94 MG/DL (ref 65–140)
GLUCOSE UR STRIP-MCNC: NEGATIVE MG/DL
HCT VFR BLD AUTO: 41.5 % (ref 36.5–49.3)
HGB BLD-MCNC: 14.5 G/DL (ref 12–17)
HGB UR QL STRIP.AUTO: NEGATIVE
INR PPP: 1.01 (ref 0.86–1.16)
KETONES UR STRIP-MCNC: NEGATIVE MG/DL
LEUKOCYTE ESTERASE UR QL STRIP: NEGATIVE
LYMPHOCYTES # BLD AUTO: 2.67 THOUSANDS/ΜL (ref 0.6–4.47)
LYMPHOCYTES NFR BLD AUTO: 36 % (ref 14–44)
MCH RBC QN AUTO: 32.4 PG (ref 26.8–34.3)
MCHC RBC AUTO-ENTMCNC: 34.9 G/DL (ref 31.4–37.4)
MCV RBC AUTO: 93 FL (ref 82–98)
MONOCYTES # BLD AUTO: 0.58 THOUSAND/ΜL (ref 0.17–1.22)
MONOCYTES NFR BLD AUTO: 8 % (ref 4–12)
NEUTROPHILS # BLD AUTO: 3.84 THOUSANDS/ΜL (ref 1.85–7.62)
NEUTS SEG NFR BLD AUTO: 51 % (ref 43–75)
NITRITE UR QL STRIP: NEGATIVE
NRBC BLD AUTO-RTO: 0 /100 WBCS
PH UR STRIP.AUTO: 5.5 [PH] (ref 4.5–8)
PLATELET # BLD AUTO: 330 THOUSANDS/UL (ref 149–390)
PMV BLD AUTO: 9.4 FL (ref 8.9–12.7)
POTASSIUM SERPL-SCNC: 4 MMOL/L (ref 3.5–5.3)
PROT SERPL-MCNC: 8 G/DL (ref 6.4–8.2)
PROT UR STRIP-MCNC: NEGATIVE MG/DL
PROTHROMBIN TIME: 13.3 SECONDS (ref 12.1–14.4)
RBC # BLD AUTO: 4.47 MILLION/UL (ref 3.88–5.62)
SODIUM SERPL-SCNC: 142 MMOL/L (ref 136–145)
SP GR UR STRIP.AUTO: 1.02 (ref 1–1.03)
UROBILINOGEN UR QL STRIP.AUTO: 0.2 E.U./DL
WBC # BLD AUTO: 7.43 THOUSAND/UL (ref 4.31–10.16)

## 2017-11-15 PROCEDURE — 36415 COLL VENOUS BLD VENIPUNCTURE: CPT

## 2017-11-15 PROCEDURE — 80053 COMPREHEN METABOLIC PANEL: CPT

## 2017-11-15 PROCEDURE — 81003 URINALYSIS AUTO W/O SCOPE: CPT | Performed by: ORTHOPAEDIC SURGERY

## 2017-11-15 PROCEDURE — 85025 COMPLETE CBC W/AUTO DIFF WBC: CPT

## 2017-11-15 PROCEDURE — 85610 PROTHROMBIN TIME: CPT

## 2017-11-15 RX ORDER — NAPROXEN 500 MG/1
250 TABLET ORAL 2 TIMES DAILY PRN
COMMUNITY
End: 2017-12-02 | Stop reason: HOSPADM

## 2017-11-15 NOTE — ANESTHESIA PREPROCEDURE EVALUATION
Review of Systems/Medical History  Patient summary reviewed  Chart reviewed  No history of anesthetic complications     Cardiovascular  Hypertension poorly controlled,    Pulmonary  Negative pulmonary ROS Smoker ex-smoker , ,        GI/Hepatic    GERD well controlled,        Negative  ROS        Endo/Other  Arthritis     GYN  Negative gynecology ROS          Hematology  Negative hematology ROS      Musculoskeletal  Negative musculoskeletal ROS        Neurology  Negative neurology ROS      Psychology   Negative psychology ROS            Physical Exam    Airway    Mallampati score: II  TM Distance: >3 FB  Neck ROM: full     Dental   No notable dental hx     Cardiovascular  Rhythm: regular, Rate: normal, Cardiovascular exam normal    Pulmonary  Pulmonary exam normal Breath sounds clear to auscultation,     Other Findings        Anesthesia Plan  ASA Score- 2       Anesthesia Type- spinal with ASA Monitors  Additional Monitors:   Airway Plan:           Induction- intravenous  Informed Consent- Anesthetic plan and risks discussed with patient

## 2017-11-15 NOTE — PRE-PROCEDURE INSTRUCTIONS
Pre-Surgery Instructions:   Medication Instructions    cyclobenzaprine (FLEXERIL) 10 mg tablet Instructed patient per Anesthesia Guidelines   losartan (COZAAR) 100 MG tablet Instructed patient per Anesthesia Guidelines   Multiple Vitamin (MULTIVITAMIN) tablet Instructed patient per Anesthesia Guidelines   naproxen (NAPROSYN) 500 mg tablet Patient was instructed by Physician and understands  No meds needed am of surgery per anesthesia DR SAUCEDA

## 2017-11-20 ENCOUNTER — ALLSCRIPTS OFFICE VISIT (OUTPATIENT)
Dept: OTHER | Facility: OTHER | Age: 57
End: 2017-11-20

## 2017-11-21 NOTE — PROGRESS NOTES
Assessment    1  Preop examination (V72 84) (Z01 818)   2  History of Primary localized osteoarthritis of left hip (715 15) (M16 12)   3  Primary localized osteoarthritis of left hip (715 15) (M16 12)   4  Benign essential hypertension (401 1) (I10)    Plan  Acid reflux    · Pantoprazole Sodium 40 MG Oral Tablet Delayed Release; TAKE 1 TABLET DAILY    Discussion/Summary  Surgical Clearance: He is at a LOW risk from a cardiovascular standpoint at this time without any additional cardiac testing  Reevaluation needed, if he should present with symptoms prior to surgery/procedure  Patient presented for preop clearance for left total hip replacement  His chronic problems are stable  His hypertension is well controlled on losartan  I reviewed his preoperative blood work including urinalysis CBC CMP coag panel  EKG and chest x-ray done 6 months ago also acceptable  He is low risk for this procedure and is medically cleared to proceed  Chief Complaint  Pt presents for pre-op clearance for hip surgery 12/01/2017 @ [facility]  History of Present Illness  Pre-Op Visit (Brief): The patient is being seen for Left total hip replacement  Surgical Risk Assessment:  Prior Anesthesia: He had prior anesthesia-- and-- no prior adverse reaction to general anesthesia  Pertinent Past Medical History: Hypertension  Exercise Capacity: able to walk two flights of stairs without symptoms  Lifestyle Factors: denies tobacco use and denies illegal drug use  Symptoms: no symptoms  Pertinent Family History: no pertinent family history  Living Situation: home is secure and supportive  HPI: Patient presents for preop clearance for left total hip replacement  His right hip was replaced approximately 6 months ago and he recovered well from that  His only chronic medical problem is hypertension which is well controlled on losartan 100 mg        Review of Systems   Constitutional: No fever or chills, feels well, no tiredness, no recent weight gain or weight loss  Eyes: No complaints of eye pain, no red eyes, no discharge from eyes, no itchy eyes  ENT: no complaints of earache, no hearing loss, no nosebleeds, no nasal discharge, no sore throat, no hoarseness  Cardiovascular: No complaints of slow heart rate, no fast heart rate, no chest pain, no palpitations, no leg claudication, no lower extremity  Respiratory: No complaints of shortness of breath, no wheezing, no cough, no SOB on exertion, no orthopnea or PND  Gastrointestinal: No complaints of abdominal pain, no constipation, no nausea or vomiting, no diarrhea or bloody stools  Genitourinary: No complaints of dysuria, no incontinence, no hesitancy, no nocturia, no genital lesion, no testicular pain  Musculoskeletal: as noted in HPI  Integumentary: No complaints of skin rash or skin lesions, no itching, no skin wound, no dry skin  Neurological: No compliants of headache, no confusion, no convulsions, no numbness or tingling, no dizziness or fainting, no limb weakness, no difficulty walking  Psychiatric: Is not suicidal, no sleep disturbances, no anxiety or depression, no change in personality, no emotional problems  Endocrine: No complaints of proptosis, no hot flashes, no muscle weakness, no erectile dysfunction, no deepening of the voice, no feelings of weakness  Hematologic/Lymphatic: No complaints of swollen glands, no swollen glands in the neck, does not bleed easily, no easy bruising  Active Problems  1  Acid reflux (530 81) (K21 9)   2  Acute gastritis (535 00) (K29 00)   3  Benign essential hypertension (401 1) (I10)   4  Chronic low back pain (724 2,338 29) (M54 5,G89 29)   5  Degenerative tear of acetabular labrum of right hip (715 95) (M16 11)   6  Diffuse arthralgia (719 40) (M25 50)   7  Enlarged prostate (600 00) (N40 0)   8  Esophageal reflux (530 81) (K21 9)   9  Need for immunization against influenza (V04 81) (Z23)   10   Osteoarthritis of right hip (715 95) (M16 11) 11  Pain of right hip joint (719 45) (M25 551)   12  Preop examination (V72 84) (Z01 818)   13  Primary localized osteoarthritis of right hip (715 15) (M16 11)   14  Sciatica (724 3) (M54 30)   15  Vitamin D deficiency (268 9) (E55 9)    Past Medical History   · History of Acute pain (338 19) (R52)   · History of Bilateral hip pain (719 45) (M25 551,M25 552)   · History of Dysphagia (787 20) (R13 10)   · History of Encounter for screening colonoscopy (V76 51) (Z12 11)   · History of Fatigue, unspecified type (780 79) (R53 83)   · History of Foot pain, unspecified laterality   · History of Groin pain (789 09) (R10 30)   · History of abdominal pain (V13 89) (M92 297)   · History of constipation (V12 79) (Z87 19)   · History of dermatitis (V13 3) (Z87 2)   · History of esophageal reflux (V12 79) (Z87 19)   · History of low back pain (V13 59) (Z87 39)   · History of Lipid screening (V77 91) (Z13 220)   · History of Neck pain (723 1) (M54 2)   · History of Need for immunization against influenza (V04 81) (Z23)   · History of Primary localized osteoarthritis of left hip (715 15) (M16 12)   · History of Screening for prostate cancer (V76 44) (Z12 5)   · History of Skin lesion (709 9) (L98 9)   · History of Special screening examination for neoplasm of prostate (V76 44) (Z12 5)   · History of Urinary frequency (788 41) (R35 0)    The active problems and past medical history were reviewed and updated today  Surgical History    The surgical history was reviewed and updated today  Family History  Mother    · Family history of Hypertension  Father    · Family history of Hypertension  Sister    · Family history of Hypertension  Brother    · Family history of Diabetes   · Family history of Hypertension    Social History     · Former smoker (B58 69) (V92 073)  The social history was reviewed and updated today  Current Meds   1  Cyclobenzaprine HCl - 10 MG Oral Tablet; TAKE 1 TABLET 3 TIMES DAILY AS NEEDED;  Therapy: 94CWM9263 to (Evaluate:26Apr2017)  Requested for: 43Nez1709; Last Rx:07Cke1988 Ordered   2  Losartan Potassium 100 MG Oral Tablet; TAKE 1 TABLET DAILY (NEED APPOINTMENT); Therapy: 88DLE3699 to (Last AA:37QNK9572)  Requested for: 24Oct2017 Ordered   3  Naproxen 500 MG Oral Tablet; TAKE 1 TABLET Every twelve hours PRN; Therapy: 86CUX5267 to Recorded    The medication list was reviewed and updated today  Allergies  1  No Known Drug Allergies    Vitals   Recorded: 20Nov2017 06:07PM   Temperature 97 4 F, Tympanic   Heart Rate 93   Pulse Quality Normal   Respiration Quality Normal   Respiration 15   Systolic 882, LUE, Sitting   Diastolic 92, LUE, Sitting   Height 5 ft 10 in   Weight 186 lb 5 oz   BMI Calculated 26 73   BSA Calculated 2 03   O2 Saturation 98   Pain Scale 3       Physical Exam   Ears, Nose, Mouth, and Throat  External inspection of ears and nose: Normal    Otoscopic examination: Tympanic membranes translucent with normal light reflex  Canals patent without erythema  Hearing: Normal    Nasal mucosa, septum, and turbinates: Normal without edema or erythema  Lips, teeth, and gums: Normal, good dentition  Oropharynx: Normal with no erythema, edema, exudate or lesions  Neck  Neck: Supple, symmetric, trachea midline, no masses  Pulmonary  Respiratory effort: No increased work of breathing or signs of respiratory distress  Auscultation of lungs: Clear to auscultation  Cardiovascular  Auscultation of heart: Normal rate and rhythm, normal S1 and S2, no murmurs  Carotid pulses: 2+ bilaterally  Examination of extremities for edema and/or varicosities: Normal    Abdomen  Abdomen: Non-tender, no masses  Musculoskeletal  Gait and station: Abnormal  -- Antalgic gait  Inspection/palpation of digits and nails: Normal without clubbing or cyanosis     Inspection/palpation of joints, bones, and muscles: Normal    Range of motion: Normal    Stability: Normal    Muscle strength/tone: Normal    Skin  Skin and subcutaneous tissue: Normal without rashes or lesions  Neurologic  Cranial nerves: Cranial nerves 2-12 intact  Psychiatric  Judgment and insight: Normal    Orientation to person, place and time: Normal    Recent and remote memory: Intact  Mood and affect: Normal        Results/Data  (1) PT WITH INR [id number] 05:04PM EP[facility], Provider   Test ordered by: Jenny Castro     Test Name Result Flag Reference   INR 1 01  0 86-1 16   PT 13 3 seconds  12 1-14 4     (1) URINALYSIS w URINE C/S REFLEX (will reflex a microscopy if leukocytes, occult blood, or nitrites are not within normal limits) [id number] 05:04PM [facility], Provider   Test ordered by: Jenny Castro     Test Name Result Flag Reference   COLOR Yellow     CLARITY Clear     PH UA 5 5  4 5-8 0   LEUKOCYTE ESTERASE UA Negative  Negative   NITRITE UA Negative  Negative   PROTEIN UA Negative mg/dl  Negative   GLUCOSE UA Negative mg/dl  Negative   KETONES UA Negative mg/dl  Negative   UROBILINOGEN UA 0 2 E U /dl  0 2, 1 0 E U /dl   BILIRUBIN UA Negative  Negative   BLOOD UA Negative  Negative, Trace-Intact   SPECIFIC GRAVITY UA 1 020  1 003-1 030     (1) CBC/PLT/DIFF [id number] 05:04PM EP[facility], Provider   Test ordered by: Jenny Castro     Test Name Result Flag Reference   WBC COUNT 7 43 Thousand/uL  4 31-10 16   RBC COUNT 4 47 Million/uL  3 88-5 62   HEMOGLOBIN 14 5 g/dL  12 0-17 0   HEMATOCRIT 41 5 %  36 5-49 3   MCV 93 fL  82-98   MCH 32 4 pg  26 8-34 3   MCHC 34 9 g/dL  31 4-37 4   RDW 12 8 %  11 6-15 1   MPV 9 4 fL  8 9-12 7   PLATELET COUNT 852 Thousands/uL  149-390   nRBC AUTOMATED 0 /100 WBCs     NEUTROPHILS RELATIVE PERCENT 51 %  43-75   LYMPHOCYTES RELATIVE PERCENT 36 %  14-44   MONOCYTES RELATIVE PERCENT 8 %  4-12   EOSINOPHILS RELATIVE PERCENT 4 %  0-6   BASOPHILS RELATIVE PERCENT 1 %  0-1   NEUTROPHILS ABSOLUTE COUNT 3 84 Thousands/? ??L  1 85-7 62   LYMPHOCYTES ABSOLUTE COUNT 2 67 Thousands/? ??L  0 60-4 47   MONOCYTES ABSOLUTE COUNT 0 58 Thousand/? ??L  0 17-1 22 EOSINOPHILS ABSOLUTE COUNT 0 30 Thousand/? ??L  0 00-0 61   BASOPHILS ABSOLUTE COUNT 0 04 Thousands/? ??L  0 00-0 10   This is a patient instruction: This test is non-fasting  Please drink two glasses of water morning of bloodwork  (1) COMPREHENSIVE METABOLIC PANEL [id number] 34:82UL EPIC, Provider   Test ordered by: Tello Malik     Test Name Result Flag Reference   GLUCOSE,RANDM 94 mg/dL       If the patient is fasting, the ADA then defines impaired fasting glucose as > 100 mg/dL and diabetes as > or equal to 123 mg/dL  Specimen collection should occur prior to Sulfasalazine administration due to the potential for falsely depressed results  Specimen collection should occur prior to Sulfapyridine administration due to the potential for falsely elevated results  SODIUM 142 mmol/L  136-145   POTASSIUM 4 0 mmol/L  3 5-5 3   CHLORIDE 103 mmol/L  100-108   CARBON DIOXIDE 29 mmol/L  21-32   ANION GAP (CALC) 10 mmol/L  4-13   BLOOD UREA NITROGEN 20 mg/dL  5-25   CREATININE 1 12 mg/dL  0 60-1 30   Standardized to IDMS reference method   CALCIUM 9 4 mg/dL  8 3-10 1   BILI, TOTAL 0 52 mg/dL  0 20-1 00   ALK PHOSPHATAS 57 U/L     ALT (SGPT) 53 U/L  12-78   Specimen collection should occur prior to Sulfasalazine administration due to the potential for falsely depressed results  AST(SGOT) 29 U/L  5-45   Specimen collection should occur prior to Sulfasalazine administration due to the potential for falsely depressed results  ALBUMIN 4 6 g/dL  3 5-5 0   TOTAL PROTEIN 8 0 g/dL  6 4-8 2   eGFR 72 ml/min/1 73sq [facility] [employer] Disease Education Program recommendations are as follows: GFR calculation is accurate only with a steady state creatinine Chronic Kidney disease less than 60 ml/min/1 73 sq  meters Kidney failure less than 15 ml/min/1 73 sq  meters       * XR CHEST PA & LATERAL [id number] 08:49AM EPIC, Provider   Test ordered by: Tello Malik     Test Name Result Flag Reference   XR CHEST PA & LATERAL (Report)       CHEST    INDICATION: Pre-op right total hip arthroplasty   COMPARISON: January 30, 2015   VIEWS: Frontal and lateral projections   IMAGES: 2   FINDINGS:      Cardiomediastinal silhouette appears unremarkable  The lungs are clear  No pneumothorax or pleural effusion  Visualized osseous structures appear within normal limits for the patient's age  IMPRESSION:   No active pulmonary disease  Workstation performed: [id number]   Signed by:  Alicia Gaucher, MD  5/3/17     End of Encounter Meds    1  Pantoprazole Sodium 40 MG Oral Tablet Delayed Release; TAKE 1 TABLET DAILY; Therapy: 46MVG7203 to (Evaluate:18Jun2018)  Requested for: 20Nov2017; Last Rx:20Nov2017; Status: ACTIVE - Transmit to Pharmacy - Awaiting Verification Ordered    2  Losartan Potassium 100 MG Oral Tablet; TAKE 1 TABLET DAILY (NEED APPOINTMENT); Therapy: 15WTZ7862 to (Last LV:71YXR9556)  Requested for: 24Oct2017 Ordered    3  Naproxen 500 MG Oral Tablet; TAKE 1 TABLET Every twelve hours PRN; Therapy: 15PAV9859 to Recorded    4  Cyclobenzaprine HCl - 10 MG Oral Tablet; TAKE 1 TABLET 3 TIMES DAILY AS NEEDED;  Therapy: 17Xkb3340 to (Evaluate:26Apr2017)  Requested for: 23Xiw6265; Last Rx:[id number] Ordered    Signatures   Electronically signed by : Nguyễn Chavez DO; Nov 20 2017  6:42PM EST                       (Author)

## 2017-12-01 ENCOUNTER — HOSPITAL ENCOUNTER (INPATIENT)
Facility: HOSPITAL | Age: 57
LOS: 1 days | Discharge: HOME HEALTH | DRG: 470 | End: 2017-12-02
Attending: ORTHOPAEDIC SURGERY | Admitting: ORTHOPAEDIC SURGERY
Payer: COMMERCIAL

## 2017-12-01 ENCOUNTER — ANESTHESIA (OUTPATIENT)
Dept: PERIOP | Facility: HOSPITAL | Age: 57
DRG: 470 | End: 2017-12-01
Payer: COMMERCIAL

## 2017-12-01 ENCOUNTER — HOSPITAL ENCOUNTER (OUTPATIENT)
Dept: RADIOLOGY | Facility: HOSPITAL | Age: 57
Setting detail: SURGERY ADMIT
Discharge: HOME | DRG: 470 | End: 2017-12-01
Payer: COMMERCIAL

## 2017-12-01 ENCOUNTER — APPOINTMENT (INPATIENT)
Dept: RADIOLOGY | Facility: HOSPITAL | Age: 57
DRG: 470 | End: 2017-12-01
Payer: COMMERCIAL

## 2017-12-01 ENCOUNTER — GENERIC CONVERSION - ENCOUNTER (OUTPATIENT)
Dept: OTHER | Facility: OTHER | Age: 57
End: 2017-12-01

## 2017-12-01 DIAGNOSIS — M16.12 PRIMARY OSTEOARTHRITIS OF LEFT HIP: Primary | ICD-10-CM

## 2017-12-01 DIAGNOSIS — M16.12 PRIMARY OSTEOARTHRITIS OF LEFT HIP: ICD-10-CM

## 2017-12-01 PROCEDURE — 86850 RBC ANTIBODY SCREEN: CPT | Performed by: ORTHOPAEDIC SURGERY

## 2017-12-01 PROCEDURE — 97163 PT EVAL HIGH COMPLEX 45 MIN: CPT

## 2017-12-01 PROCEDURE — 94762 N-INVAS EAR/PLS OXIMTRY CONT: CPT

## 2017-12-01 PROCEDURE — C1776 JOINT DEVICE (IMPLANTABLE): HCPCS | Performed by: ORTHOPAEDIC SURGERY

## 2017-12-01 PROCEDURE — C1713 ANCHOR/SCREW BN/BN,TIS/BN: HCPCS | Performed by: ORTHOPAEDIC SURGERY

## 2017-12-01 PROCEDURE — G8978 MOBILITY CURRENT STATUS: HCPCS

## 2017-12-01 PROCEDURE — 86900 BLOOD TYPING SEROLOGIC ABO: CPT | Performed by: ORTHOPAEDIC SURGERY

## 2017-12-01 PROCEDURE — 73501 X-RAY EXAM HIP UNI 1 VIEW: CPT

## 2017-12-01 PROCEDURE — 86901 BLOOD TYPING SEROLOGIC RH(D): CPT | Performed by: ORTHOPAEDIC SURGERY

## 2017-12-01 PROCEDURE — 0SRB02A REPLACEMENT OF LEFT HIP JOINT WITH METAL ON POLYETHYLENE SYNTHETIC SUBSTITUTE, UNCEMENTED, OPEN APPROACH: ICD-10-PCS | Performed by: ORTHOPAEDIC SURGERY

## 2017-12-01 PROCEDURE — G8979 MOBILITY GOAL STATUS: HCPCS

## 2017-12-01 DEVICE — BIOLOX DELTA CERAMIC FEMORAL HEAD +5.0 36MM DIA 12/14 TAPER
Type: IMPLANTABLE DEVICE | Site: HIP | Status: FUNCTIONAL
Brand: BIOLOX DELTA

## 2017-12-01 DEVICE — PINNACLE CANCELLOUS BONE SCREW 6.5MM X 20MM
Type: IMPLANTABLE DEVICE | Site: HIP | Status: FUNCTIONAL
Brand: PINNACLE

## 2017-12-01 DEVICE — CORAIL HIP SYSTEM CEMENTLESS FEMORAL STEM 12/14 AMT 135 DEGREES KA SIZE 12 HA COATED STANDARD COLLAR
Type: IMPLANTABLE DEVICE | Site: HIP | Status: FUNCTIONAL
Brand: CORAIL

## 2017-12-01 DEVICE — PINNACLE HIP SOLUTIONS ALTRX POLYETHYLENE ACETABULAR LINER NEUTRAL 36MM ID 54MM OD
Type: IMPLANTABLE DEVICE | Site: HIP | Status: FUNCTIONAL
Brand: PINNACLE ALTRX

## 2017-12-01 DEVICE — PINNACLE POROCOAT ACETABULAR SHELL SECTOR II 54MM OD
Type: IMPLANTABLE DEVICE | Site: HIP | Status: FUNCTIONAL
Brand: PINNACLE POROCOAT

## 2017-12-01 RX ORDER — LABETALOL HYDROCHLORIDE 5 MG/ML
INJECTION, SOLUTION INTRAVENOUS AS NEEDED
Status: DISCONTINUED | OUTPATIENT
Start: 2017-12-01 | End: 2017-12-01 | Stop reason: SURG

## 2017-12-01 RX ORDER — SENNOSIDES 8.6 MG
1 TABLET ORAL DAILY
Status: DISCONTINUED | OUTPATIENT
Start: 2017-12-01 | End: 2017-12-02 | Stop reason: HOSPADM

## 2017-12-01 RX ORDER — OXYCODONE HYDROCHLORIDE 10 MG/1
10 TABLET ORAL EVERY 4 HOURS PRN
Status: DISCONTINUED | OUTPATIENT
Start: 2017-12-02 | End: 2017-12-02 | Stop reason: HOSPADM

## 2017-12-01 RX ORDER — CELECOXIB 200 MG/1
200 CAPSULE ORAL DAILY
Status: DISCONTINUED | OUTPATIENT
Start: 2017-12-01 | End: 2017-12-02 | Stop reason: HOSPADM

## 2017-12-01 RX ORDER — NALOXONE HYDROCHLORIDE 0.4 MG/ML
0.1 INJECTION, SOLUTION INTRAMUSCULAR; INTRAVENOUS; SUBCUTANEOUS
Status: ACTIVE | OUTPATIENT
Start: 2017-12-01 | End: 2017-12-02

## 2017-12-01 RX ORDER — ONDANSETRON 2 MG/ML
4 INJECTION INTRAMUSCULAR; INTRAVENOUS EVERY 6 HOURS PRN
Status: DISCONTINUED | OUTPATIENT
Start: 2017-12-02 | End: 2017-12-02 | Stop reason: HOSPADM

## 2017-12-01 RX ORDER — KETOROLAC TROMETHAMINE 30 MG/ML
30 INJECTION, SOLUTION INTRAMUSCULAR; INTRAVENOUS EVERY 6 HOURS PRN
Status: DISCONTINUED | OUTPATIENT
Start: 2017-12-01 | End: 2017-12-02 | Stop reason: HOSPADM

## 2017-12-01 RX ORDER — METOCLOPRAMIDE HYDROCHLORIDE 5 MG/ML
5 INJECTION INTRAMUSCULAR; INTRAVENOUS EVERY 6 HOURS PRN
Status: ACTIVE | OUTPATIENT
Start: 2017-12-01 | End: 2017-12-02

## 2017-12-01 RX ORDER — SODIUM CHLORIDE, SODIUM LACTATE, POTASSIUM CHLORIDE, CALCIUM CHLORIDE 600; 310; 30; 20 MG/100ML; MG/100ML; MG/100ML; MG/100ML
125 INJECTION, SOLUTION INTRAVENOUS CONTINUOUS
Status: DISCONTINUED | OUTPATIENT
Start: 2017-12-01 | End: 2017-12-02 | Stop reason: HOSPADM

## 2017-12-01 RX ORDER — ONDANSETRON 2 MG/ML
4 INJECTION INTRAMUSCULAR; INTRAVENOUS EVERY 4 HOURS PRN
Status: ACTIVE | OUTPATIENT
Start: 2017-12-01 | End: 2017-12-02

## 2017-12-01 RX ORDER — PANTOPRAZOLE SODIUM 40 MG/1
40 TABLET, DELAYED RELEASE ORAL DAILY
COMMUNITY
End: 2018-06-05 | Stop reason: SDUPTHER

## 2017-12-01 RX ORDER — ASPIRIN 81 MG/1
81 TABLET, CHEWABLE ORAL 2 TIMES DAILY
Status: DISCONTINUED | OUTPATIENT
Start: 2017-12-01 | End: 2017-12-02 | Stop reason: HOSPADM

## 2017-12-01 RX ORDER — DEXAMETHASONE SODIUM PHOSPHATE 4 MG/ML
4 INJECTION, SOLUTION INTRA-ARTICULAR; INTRALESIONAL; INTRAMUSCULAR; INTRAVENOUS; SOFT TISSUE ONCE AS NEEDED
Status: ACTIVE | OUTPATIENT
Start: 2017-12-01 | End: 2017-12-02

## 2017-12-01 RX ORDER — FENTANYL CITRATE/PF 50 MCG/ML
25 SYRINGE (ML) INJECTION
Status: DISCONTINUED | OUTPATIENT
Start: 2017-12-01 | End: 2017-12-01 | Stop reason: HOSPADM

## 2017-12-01 RX ORDER — VANCOMYCIN HYDROCHLORIDE 1 G/200ML
1000 INJECTION, SOLUTION INTRAVENOUS ONCE
Status: COMPLETED | OUTPATIENT
Start: 2017-12-01 | End: 2017-12-01

## 2017-12-01 RX ORDER — MAGNESIUM HYDROXIDE 1200 MG/15ML
LIQUID ORAL AS NEEDED
Status: DISCONTINUED | OUTPATIENT
Start: 2017-12-01 | End: 2017-12-01 | Stop reason: HOSPADM

## 2017-12-01 RX ORDER — CYCLOBENZAPRINE HCL 10 MG
10 TABLET ORAL 3 TIMES DAILY PRN
Status: DISCONTINUED | OUTPATIENT
Start: 2017-12-01 | End: 2017-12-02 | Stop reason: HOSPADM

## 2017-12-01 RX ORDER — CALCIUM CARBONATE 200(500)MG
1000 TABLET,CHEWABLE ORAL DAILY PRN
Status: DISCONTINUED | OUTPATIENT
Start: 2017-12-01 | End: 2017-12-02 | Stop reason: HOSPADM

## 2017-12-01 RX ORDER — NALBUPHINE HCL 10 MG/ML
5 AMPUL (ML) INJECTION
Status: ACTIVE | OUTPATIENT
Start: 2017-12-01 | End: 2017-12-02

## 2017-12-01 RX ORDER — MIDAZOLAM HYDROCHLORIDE 1 MG/ML
INJECTION INTRAMUSCULAR; INTRAVENOUS AS NEEDED
Status: DISCONTINUED | OUTPATIENT
Start: 2017-12-01 | End: 2017-12-01 | Stop reason: SURG

## 2017-12-01 RX ORDER — SIMETHICONE 80 MG
80 TABLET,CHEWABLE ORAL 4 TIMES DAILY PRN
Status: DISCONTINUED | OUTPATIENT
Start: 2017-12-01 | End: 2017-12-02 | Stop reason: HOSPADM

## 2017-12-01 RX ORDER — TRANEXAMIC ACID 100 MG/ML
INJECTION, SOLUTION INTRAVENOUS AS NEEDED
Status: DISCONTINUED | OUTPATIENT
Start: 2017-12-01 | End: 2017-12-01 | Stop reason: SURG

## 2017-12-01 RX ORDER — PANTOPRAZOLE SODIUM 40 MG/1
40 TABLET, DELAYED RELEASE ORAL
Status: DISCONTINUED | OUTPATIENT
Start: 2017-12-01 | End: 2017-12-02 | Stop reason: HOSPADM

## 2017-12-01 RX ORDER — PROPOFOL 10 MG/ML
INJECTION, EMULSION INTRAVENOUS CONTINUOUS PRN
Status: DISCONTINUED | OUTPATIENT
Start: 2017-12-01 | End: 2017-12-01 | Stop reason: SURG

## 2017-12-01 RX ORDER — LOSARTAN POTASSIUM 50 MG/1
100 TABLET ORAL DAILY
Status: DISCONTINUED | OUTPATIENT
Start: 2017-12-01 | End: 2017-12-02 | Stop reason: HOSPADM

## 2017-12-01 RX ORDER — ONDANSETRON 2 MG/ML
INJECTION INTRAMUSCULAR; INTRAVENOUS AS NEEDED
Status: DISCONTINUED | OUTPATIENT
Start: 2017-12-01 | End: 2017-12-01 | Stop reason: SURG

## 2017-12-01 RX ORDER — OXYCODONE HYDROCHLORIDE 5 MG/1
5 TABLET ORAL EVERY 4 HOURS PRN
Status: DISCONTINUED | OUTPATIENT
Start: 2017-12-02 | End: 2017-12-02 | Stop reason: HOSPADM

## 2017-12-01 RX ORDER — ACETAMINOPHEN 325 MG/1
650 TABLET ORAL EVERY 6 HOURS PRN
Status: DISCONTINUED | OUTPATIENT
Start: 2017-12-01 | End: 2017-12-02 | Stop reason: HOSPADM

## 2017-12-01 RX ORDER — DIPHENHYDRAMINE HYDROCHLORIDE 50 MG/ML
25 INJECTION INTRAMUSCULAR; INTRAVENOUS EVERY 6 HOURS PRN
Status: DISPENSED | OUTPATIENT
Start: 2017-12-01 | End: 2017-12-02

## 2017-12-01 RX ORDER — ONDANSETRON 2 MG/ML
4 INJECTION INTRAMUSCULAR; INTRAVENOUS ONCE AS NEEDED
Status: DISCONTINUED | OUTPATIENT
Start: 2017-12-01 | End: 2017-12-01 | Stop reason: HOSPADM

## 2017-12-01 RX ORDER — TRAMADOL HYDROCHLORIDE 50 MG/1
50 TABLET ORAL EVERY 6 HOURS PRN
Status: DISCONTINUED | OUTPATIENT
Start: 2017-12-02 | End: 2017-12-02 | Stop reason: HOSPADM

## 2017-12-01 RX ORDER — BUPIVACAINE HYDROCHLORIDE AND EPINEPHRINE 2.5; 5 MG/ML; UG/ML
INJECTION, SOLUTION EPIDURAL; INFILTRATION; INTRACAUDAL; PERINEURAL AS NEEDED
Status: DISCONTINUED | OUTPATIENT
Start: 2017-12-01 | End: 2017-12-01 | Stop reason: SURG

## 2017-12-01 RX ORDER — SODIUM CHLORIDE 9 MG/ML
125 INJECTION, SOLUTION INTRAVENOUS CONTINUOUS
Status: DISCONTINUED | OUTPATIENT
Start: 2017-12-01 | End: 2017-12-02 | Stop reason: HOSPADM

## 2017-12-01 RX ORDER — MORPHINE SULFATE 0.5 MG/ML
INJECTION, SOLUTION EPIDURAL; INTRATHECAL; INTRAVENOUS AS NEEDED
Status: DISCONTINUED | OUTPATIENT
Start: 2017-12-01 | End: 2017-12-01 | Stop reason: SURG

## 2017-12-01 RX ORDER — HYDRALAZINE HYDROCHLORIDE 20 MG/ML
INJECTION INTRAMUSCULAR; INTRAVENOUS AS NEEDED
Status: DISCONTINUED | OUTPATIENT
Start: 2017-12-01 | End: 2017-12-01 | Stop reason: SURG

## 2017-12-01 RX ADMIN — HYDRALAZINE HYDROCHLORIDE 10 MG: 20 INJECTION INTRAMUSCULAR; INTRAVENOUS at 11:57

## 2017-12-01 RX ADMIN — FLUORESCEIN SODIUM 1 STRIP: 1 STRIP OPHTHALMIC at 14:16

## 2017-12-01 RX ADMIN — SODIUM CHLORIDE, SODIUM LACTATE, POTASSIUM CHLORIDE, AND CALCIUM CHLORIDE 125 ML/HR: .6; .31; .03; .02 INJECTION, SOLUTION INTRAVENOUS at 22:05

## 2017-12-01 RX ADMIN — CELECOXIB 200 MG: 200 CAPSULE ORAL at 16:17

## 2017-12-01 RX ADMIN — SODIUM CHLORIDE: 0.9 INJECTION, SOLUTION INTRAVENOUS at 11:07

## 2017-12-01 RX ADMIN — SODIUM CHLORIDE 125 ML/HR: 0.9 INJECTION, SOLUTION INTRAVENOUS at 09:03

## 2017-12-01 RX ADMIN — TRANEXAMIC ACID 1 G: 100 INJECTION, SOLUTION INTRAVENOUS at 11:07

## 2017-12-01 RX ADMIN — LABETALOL HYDROCHLORIDE 5 MG: 5 INJECTION, SOLUTION INTRAVENOUS at 11:41

## 2017-12-01 RX ADMIN — MORPHINE SULFATE 0.1 MG: 0.5 INJECTION, SOLUTION EPIDURAL; INTRATHECAL; INTRAVENOUS at 10:51

## 2017-12-01 RX ADMIN — DIPHENHYDRAMINE HYDROCHLORIDE 25 MG: 50 INJECTION, SOLUTION INTRAMUSCULAR; INTRAVENOUS at 19:49

## 2017-12-01 RX ADMIN — PANTOPRAZOLE SODIUM 40 MG: 40 TABLET, DELAYED RELEASE ORAL at 16:17

## 2017-12-01 RX ADMIN — ONDANSETRON HYDROCHLORIDE 4 MG: 2 INJECTION, SOLUTION INTRAVENOUS at 10:42

## 2017-12-01 RX ADMIN — ASPIRIN 81 MG 81 MG: 81 TABLET ORAL at 18:36

## 2017-12-01 RX ADMIN — PROPOFOL 90 MCG/KG/MIN: 10 INJECTION, EMULSION INTRAVENOUS at 10:56

## 2017-12-01 RX ADMIN — SENNOSIDES 8.6 MG: 8.6 TABLET, FILM COATED ORAL at 16:17

## 2017-12-01 RX ADMIN — BUPIVACAINE HYDROCHLORIDE AND EPINEPHRINE 1.7 ML: 2.5; 5 INJECTION, SOLUTION EPIDURAL; INFILTRATION; INTRACAUDAL; PERINEURAL at 10:51

## 2017-12-01 RX ADMIN — LABETALOL HYDROCHLORIDE 5 MG: 5 INJECTION, SOLUTION INTRAVENOUS at 11:56

## 2017-12-01 RX ADMIN — VANCOMYCIN HYDROCHLORIDE 1000 MG: 1 INJECTION, SOLUTION INTRAVENOUS at 10:54

## 2017-12-01 RX ADMIN — MIDAZOLAM HYDROCHLORIDE 4 MG: 1 INJECTION, SOLUTION INTRAMUSCULAR; INTRAVENOUS at 10:42

## 2017-12-01 RX ADMIN — LOSARTAN POTASSIUM 100 MG: 50 TABLET, FILM COATED ORAL at 16:17

## 2017-12-01 RX ADMIN — TRANEXAMIC ACID 1 G: 100 INJECTION, SOLUTION INTRAVENOUS at 12:11

## 2017-12-01 RX ADMIN — LABETALOL HYDROCHLORIDE 5 MG: 5 INJECTION, SOLUTION INTRAVENOUS at 11:20

## 2017-12-01 RX ADMIN — SODIUM CHLORIDE, SODIUM LACTATE, POTASSIUM CHLORIDE, AND CALCIUM CHLORIDE 125 ML/HR: .6; .31; .03; .02 INJECTION, SOLUTION INTRAVENOUS at 13:42

## 2017-12-01 RX ADMIN — CEFAZOLIN SODIUM 2000 MG: 2 SOLUTION INTRAVENOUS at 10:52

## 2017-12-01 RX ADMIN — CEFAZOLIN SODIUM 1000 MG: 1 SOLUTION INTRAVENOUS at 18:41

## 2017-12-01 NOTE — CONSULTS
Consultation - Internal Medicine  Mirlande Leblanc 59 y o  male MRN: [id number]  Unit/Bed#: E2 -25 Encounter: [id number]      Impression :-    76-year-old gentleman status post left total hip replacement earlier today by Dr Ethan Esparza  Advanced end-stage degenerative joint disease, left hip  Ambulatory dysfunction  Incomplete right bundle branch block preoperative electrocardiogram  Hypertension  Gastroesophageal reflux disease  Chronic lower back pain     Recommendation: -  Patient seems to be recuperating very well at this stage of his recovery  I discussed with him and his wife was present in the room about the numeric pain scale which she fully understands  I have reviewed patients medications as initiated on post operative orders by the primary team  Recommend  monitoring closely for any hypoxemia / respiratory insufficieny related to narcotics and to reduce doses and frequency of narcotics if necessary  Resume patients home medications and I have reviewed them  Maintain Intravenous Fluids for next 24 -48 hours, till patient able to reliably keep meals and meds in  Suggest  Zofran ODT 4 mg sublingually PRN for control of post operative nausea  Patient encouraged to  use Incentive spirometry q 15 minutes while awake to minimize risk of postoperative atelectasis and pneumonia  Patient verbalized to understand and fully comprehend  Recommend DVT prophylaxis with use of Venodyne compression boots  If any factor X A inhibitor,  low molecule weight heparin or Coumadin is planned by the primary team, we will be happy to dose that  drug based on patient's hemostasis  Maintain ASA as antiplatelet drug per Ortho  Team  Use Proton Pump inhibitor to minimize risk of gastritis  Monitor for any tinnitus as an early sign of salicylism and check salicylate levels in that situation  Discontinue patient's Carmona catheter within next 24-48 hours in order  to minimize risk of catheter associated UTI  Please check patient's hemoglobin and hematocrit within next 24 hours to ensure that there is no acute blood loss anemia which would need any blood transfusion  We will follow this pleasant patient with your service closely and recommend necessary changes based on  further hospital course and diagnostics  Thank you, Dr Linette Yen , for your kind consultation  HISTORY OF PRESENT ILLNESS:    Reason for Consult:  Post operative management for patient was undergone elective left total hip replacement earlier today by Dr Linette Yen and has multiple other comorbidities  HPI: Iza Wild is a 59 y o  male, who has suffered with chronic pain in his left hip for past many years but for the last year has been getting increasingly worse  Pain was initially dull and achy predominantly in his groin that started to radiate down towards his knee  He was also having some lower back pain shooting from his left buttock down to his shin  This was exacerbated with prolonged sitting and standing  Patient is unable to climb up steps and started taking naproxen and Advil on a regular basis  She tried physical therapy for his right hip prior to his total hip replacement which was prescribed to her by Dr Shakir Merritt  Patient started having increasing pain and eventually when he saw Dr Linette Yen he was noted to have tenderness and crepitus even with passive range of motion  Patient had intra-articular injection of his left hip with only 1 week of relief at [facility] at work radiology team done on 08/21/2017  Patient had imaging studies done as outpatient where he was noted to have severe arthritis  MRI of his lumbar spine done on July 2017 showed central canal stenosis and multiple foraminal stenosis  X-rays of his lumbar spine done on 2015 showed mild compression deformity of T10 and U38-70 of uncertain age  He was degenerative disc disease of L4-L5 spine    Patient had x-rays done on 09/18/2017 which showed subchondral sclerosis osteophyte formation and degenerative disc disease in the partially visualized aspect of the lumbar spine  Patient was given option either to continue conservative treatment O2 proceed with more definitive hip replacement  Patient was subsequently cleared by his family physician on 11/20/2017  I reviewed preoperative clearance consult  At present patient is recuperating very well he is lying supine in bed and his wife is at the bedside  He states that his pain is 0 he does not have any nausea or vomiting and had some meals  He states that he feels hungry  Review of Systems   Constitutional:   No recent  appetite change, denies chills, diaphoresis, fatigue or fever  HEENT:  Negative for congestion, sore throat and tinnitus  No visual complaints  Respiratory:  Negative cough, chest tightness, shortness of breath and wheezing  Cardiovascular:  Negative for chest pain and palpitations  Gastrointestinal: Negative for abdominal distention or pain, constipation, diarrhea and nausea  Endocrine: Negative for cold intolerance, heat intolerance, polydipsia and polyuria  Genitourinary:                Negative for  dysuria, flank pain  Tolerating Carmona without difficulty  Skin:   Negative for color change, pallor and rash  Neurological:   Negative for dizziness, tremors, seizures, syncope, facial asymmetry, speech difficulty, weakness, light-headedness, numbness and headaches  Hematological:  Musculoskeletal:  Psychiatric:  No h/o spontaneous bruising/bleeding  Joint pains as above  Negative for agitation, behavioral problems, confusion, decreased concentration, dysphoric mood and sleep disturbance  A complete system-based review of systems as discussed with patient is otherwise negative      PAST MEDICAL HISTORY:  Past Medical History:   Diagnosis Date    Arthritis     OA left hip    Chronic pain disorder     GERD (gastroesophageal reflux disease)  Hypertension      Past Surgical History:   Procedure Laterality Date    COLONOSCOPY      ESOPHAGOGASTRODUODENOSCOPY      GANGLION CYST EXCISION Right     foot    JOINT REPLACEMENT Right     NH TOTAL HIP ARTHROPLASTY Right 5/17/2017    Procedure: ARTHROPLASTY HIP TOTAL ANTERIOR;  Surgeon: Tish Koch MD;  Location[facility];  Service: Orthopedics    TONSILLECTOMY         FAMILY HISTORY:  Non-contributory    SOCIAL HISTORY:  History   Alcohol Use    Yes     Comment: on weekends     History   Drug Use No     History   Smoking Status    Former Smoker    Packs/day: 0 50    Years: 3 00    Quit date: 11/15/1977   Smokeless Tobacco    Never Used       ALLERGIES:  No Known Allergies    MEDICATIONS:  All current active medications have been reviewed    Current Facility-Administered Medications   Medication Dose Route Frequency Provider Last Rate Last Dose    acetaminophen (TYLENOL) tablet 650 mg  650 mg Oral Q6H PRN Tania David PA-C        aspirin chewable tablet 81 mg  81 mg Oral BID Tania David PA-C        calcium carbonate (TUMS) chewable tablet 1,000 mg  1,000 mg Oral Daily PRN Tania David PA-C        ceFAZolin (ANCEF) IVPB (premix) 1,000 mg  1,000 mg Intravenous Q8H Abhishek Bullock PA-C        celecoxib (CeleBREX) capsule 200 mg  200 mg Oral Daily Abhishek Bullock PA-C   200 mg at 12/01/17 1617    cyclobenzaprine (FLEXERIL) tablet 10 mg  10 mg Oral TID PRN Tania David PA-C        dexamethasone (DECADRON) injection 4 mg  4 mg Intravenous Once PRN Delphy Defalcis, DO        diphenhydrAMINE (BENADRYL) injection 25 mg  25 mg Intravenous Q6H PRN Delphy Defalcis, DO        [START ON 12/2/2017] HYDROmorphone (DILAUDID) 1 mg/mL injection 0 2 mg  0 2 mg Intravenous Q3H PRN Abhishek Bullock PA-C        HYDROmorphone (DILAUDID) 1 mg/mL injection 0 5 mg  0 5 mg Intravenous Q1H PRN Delphy Defalcis, DO        ketorolac (TORADOL) 30 mg/mL injection 30 mg  30 mg Intravenous Q6H PRN Tania David PA-C        lactated ringers infusion  125 mL/hr Intravenous Continuous Dali Buck PA-C 125 mL/hr at 17 1342 125 mL/hr at 17 1342    losartan (COZAAR) tablet 100 mg  100 mg Oral Daily Dali Buck PA-C   100 mg at 17 1617    metoclopramide (REGLAN) injection 5 mg  5 mg Intravenous Q6H PRN Delphy Defalcis, DO        nalbuphine (NUBAIN) injection 5 mg  5 mg Intravenous Q3H PRN Delphy Defalcis, DO        naloxone (NARCAN) 0 4 mg/mL injection 0 1 mg  0 1 mg Intravenous Q3 min PRN Delgisselle Defalcis, DO        [START ON 2017] ondansetron (ZOFRAN) injection 4 mg  4 mg Intravenous Q6H PRN Abhishek Bullock PA-C        ondansetron [facility]) injection 4 mg  4 mg Intravenous Q4H PRN Delgisselle Coyalcis, DO        [START ON 2017] oxyCODONE (ROXICODONE) immediate release tablet 10 mg  10 mg Oral Q4H PRN Abhishek Bullock PA-C        [START ON 2017] oxyCODONE (ROXICODONE) IR tablet 5 mg  5 mg Oral Q4H PRN Abhishek Bullock PA-C        pantoprazole (PROTONIX) EC tablet 40 mg  40 mg Oral Early Morning Dali Buck PA-C   40 mg at 17 1617    senna (SENOKOT) tablet 8 6 mg  1 tablet Oral Daily Dali Buck PA-C   8 6 mg at 17 1617    simethicone (MYLICON) chewable tablet 80 mg  80 mg Oral 4x Daily PRN Dali Buck PA-C        sodium chloride 0 9 % infusion  125 mL/hr Intravenous Continuous Bert Patel DO   Stopped at 17 1332    [START ON 2017] traMADol (ULTRAM) tablet 50 mg  50 mg Oral Q6H PRN Dali Buck PA-C           Prescriptions Prior to Admission   Medication    cyclobenzaprine (FLEXERIL) 10 mg tablet    losartan (COZAAR) 100 MG tablet    Multiple Vitamin (MULTIVITAMIN) tablet    naproxen (NAPROSYN) 500 mg tablet    pantoprazole (PROTONIX) 40 mg tablet           PHYSICAL EXAM:    Vitals:  HR:  [69-99] 86  Resp:  [9-16] 16  BP: ()/(54-93) 112/61  SpO2:  [96 %-99 %] 97 %  Temp (24hrs), Av 1 °F (36 2 °C), Min:96 2 °F (35 7 °C), Max:97 9 °F (36 6 °C)  Current: Temperature: 97 6 °F (36 4 °C)  Body mass index is 26 63 kg/m²  General Appearance:    Awake, alert, cooperative, no distress, appears of stated age  Head:    Normocephalic, without obvious abnormality, atraumatic   Eyes:    Pupils equal in size,conjunctiva/corneas clear, EOM's intact  Ears:    External ear canals, both ears no drainage or redness  Nose:   No evidence of epistaxis/ discharge from nares  Throat:   Lips, mucosa, and tongue normal    Neck:   Supple, symmetrical, trachea midline, no JVP  Back:     Symmetric, no curvature, no CVA tenderness   Lungs:     Bilateral air entry is broncho-alveolar and equal        No crepitation or rales  No pleural rub  Heart:    Regular rate and rhythm, S1S2 normal, no murmur, rub  Abdomen:     Soft, non-tender, no masses, no organomegaly   Genitalia:   Indwelling Carmona catheter  Clear urine +, No hematuria  Musculoskeletal:   Extremities appear normal, atraumatic, no cyanosis or edema  Surgical site on the left hip area has clear dressing / no bleeding or hemorrhage apparent  Vascular:   2+ in Posterior tibialis / dorsalis pedis  Skin:   Skin color, texture, turgor normal, no rashes or lesions   Neurologic:   Oriented/ Awake/ facial symmetry maintained  Speech is intact  Muscle bulk and strength is equivocal in B/L Upper and lower extremities except on operated left lower limb  Light sensation is intact B/l LE  B/L [place] flexion is WNL  LABS, IMAGING, & OTHER STUDIES:  Lab Results:  I have personally reviewed pertinent labs    Lab Results   Component Value Date     11/15/2017     11/15/2017    CO2 29 11/15/2017    ANIONGAP 10 11/15/2017    BUN 20 11/15/2017    CREATININE 1 12 11/15/2017    EGFR 72 11/15/2017    GLUCOSE 94 11/15/2017    CALCIUM 9 4 11/15/2017    AST 29 11/15/2017    ALT 53 11/15/2017    ALKPHOS 57 11/15/2017    PROT 8 0 11/15/2017    ALBUMIN 4 6 11/15/2017    BILITOT 0 52 11/15/2017     Lab Results   Component Value Date    WBC 7 43 11/15/2017 WBC 14 85 (H) 05/18/2017    WBC 5 51 05/02/2017    HGB 14 5 11/15/2017    HGB 13 0 05/18/2017    HGB 14 6 05/02/2017     11/15/2017     05/18/2017     05/02/2017       Lab Results   Component Value Date    INR 1 01 11/15/2017    INR 1 06 05/02/2017         Imaging Studies:   I have personally reviewed pertinent imaging study reports  Imaging:     Xr Hip/pelv 1 Vw Left     Result Date: 12/1/2017  Narrative: LEFT HIP INDICATION:  Status post hip replacement COMPARISON: Earlier today VIEWS: AP IMAGES:  1 FINDINGS: There is no fracture or dislocation  Hip prosthesis appears satisfactory in position and alignment  No lucency around hardware  No lytic or blastic lesions are seen  Postop changes in the soft tissues including gas and skin staples noted  Impression: Satisfactory alignment of hip prosthesis Workstation performed: [id number]     Xr Hip/pelv 1 Vw Left If Performed    Result Date: 12/1/2017  Narrative: C-ARM -    INDICATION: Procedure guidance COMPARISON:  None TECHNIQUE: FLUOROSCOPY TIME:   15 seconds 4 FLUOROSCOPIC IMAGES FINDINGS: Left total hip arthroplasty has been placed  Osseous and soft tissue detail limited by technique  Impression:  Please refer to the separate procedure notes for additional details  Workstation performed: [id number]       EKG, Pathology, and Other Studies:   I have personally reviewed pertinent reports  Ref Range & Units 5/2/17 0945   Ventricular Rate BPM 84    Atrial Rate BPM 84    IA Interval ms 162    QRSD Interval ms 92    QT Interval ms 356    QTC Interval ms 420    P Axis degrees 70    QRS [place] degrees -45    T Wave Axis degrees 12    Narrative     Normal sinus rhythm  Incomplete right bundle branch block  Left anterior fascicular block  Abnormal ECG  No previous ECGs available            Portions of the record may have been created with voice recognition software   Occasional wrong word or "sound a like" substitutions may have occurred due to the inherent limitations of voice recognition software  Read the chart carefully and recognize, using context, where substitutions have occurred

## 2017-12-01 NOTE — ANESTHESIA PROCEDURE NOTES
Spinal Block    Patient location during procedure: OR  Start time: 12/1/2017 10:45 AM  End time: 12/1/2017 10:51 AM  Reason for block: primary anesthetic  Staffing  Resident/CRNA: Shauna Sarmiento  Performed: resident/CRNA   Preanesthetic Checklist  Completed: patient identified, site marked, surgical consent, pre-op evaluation, timeout performed, IV checked, risks and benefits discussed and monitors and equipment checked  Spinal Block  Patient position: sitting  Prep: Betadine and site prepped and draped  Patient monitoring: heart rate, continuous pulse ox and frequent blood pressure checks  Approach: midline  Location: L3-4  Injection technique: single-shot  Needle  Needle type: pencil-tip   Needle gauge: 24 G  Needle length: 10 cm  Assessment  Sensory level: [id number] Assessment:  negative aspiration for heme and positive aspiration for clear CSF

## 2017-12-01 NOTE — ANESTHESIA POSTPROCEDURE EVALUATION
Post-Op Assessment Note      CV Status:  Stable    Mental Status:  Alert and awake    Hydration Status:  Euvolemic    PONV Controlled:  Controlled    Airway Patency:  Patent    Post Op Vitals Reviewed: Yes          Staff: Anesthesiologist           /64 (12/01/17 1429)    Temp      Pulse 77 (12/01/17 1429)   Resp 14 (12/01/17 1429)    SpO2 97 % (12/01/17 1429)

## 2017-12-01 NOTE — PLAN OF CARE
Problem: PHYSICAL THERAPY ADULT  Goal: Performs mobility at highest level of function for planned discharge setting  See evaluation for individualized goals  Treatment/Interventions: Functional transfer training, LE strengthening/ROM, Elevations, Therapeutic exercise, Endurance training, Patient/family training, Equipment eval/education, Bed mobility, Gait training, Spoke to nursing, Family          See flowsheet documentation for full assessment, interventions and recommendations  Prognosis: Good  Problem List: Decreased range of motion, Decreased strength, Decreased endurance, Impaired balance, Decreased mobility, Decreased skin integrity  Assessment: pt is a 57y/o m who presents s/p L ant THR 12/1/17  ordered WBAT L LE c no restrictions  PMH significant for arthritis, chronic pain, GERD, + HTN  at baseline, pt (I) c functional mobility s AD  resides c spouse in 2 story home c 1 ARYAN + 12 steps to 2nd floor c rail  currently requires min (A)x1 for functional mobility tasks 2* deficits in strength, ROM, balance, gait quality + activity tolerance noted in PT exam above  Barthel Index 50/100  min verbal cues required for safe transfer technique  ambulated 5' laterally at EOB c RW for improved stability + min verbal cues for sequencing  /66  would benefit from skilled PT to maximize functional mobility + return to PLOF  upon d/c, recommend hhpt  PT eval of high complexity 2* unstable med status s/p L ant THR  pt c multiple co-morbidities impacting PT + presents c significant decline in mobility from baseline requiring use of RW  will need to negotiate 12 steps for safe d/c home  pt also has multiple lines  Barriers to Discharge: Inaccessible home environment     Recommendation: Home PT     PT - OK to Discharge: No    See flowsheet documentation for full assessment

## 2017-12-01 NOTE — PHYSICAL THERAPY NOTE
PT Evaluation (24min)  (15:05-15:29)    Past Medical History:   Diagnosis Date    Arthritis     OA left hip    Chronic pain disorder     GERD (gastroesophageal reflux disease)     Hypertension         12/01/17 1529   Note Type   Note type Eval only   Pain Assessment   Pain Assessment No/denies pain   Home Living   Type of [address] Two level;Stairs to enter without rails  (1 ARYAN; 12 steps to 2nd floor c rail)   [address] Walker;Cane   Prior Function   Level of [place] Independent with ADLs and functional mobility   Lives With Spouse   ADL Assistance Independent   Falls in the last 6 months 0   Vocational Full time employment   Comments (+) drives   Restrictions/Precautions   Weight Bearing Precautions Per Order Yes   LLE Weight Bearing Per Order WBAT   Other Precautions Multiple lines;Telemetry;O2;Fall Risk   General   Additional Pertinent History pt presents s/p L ant THR  ordered WBAT L LE c no restrictions  PT consulted for mobility + d/c planning  activity as tolerated  Family/Caregiver Present Yes   Cognition   Orientation Level Oriented X4   RUE Assessment   RUE Assessment WFL   LUE Assessment   LUE Assessment WFL   RLE Assessment   RLE Assessment WFL  (4+/5)   LLE Assessment   LLE Assessment X  (3-/5)   Coordination   Sensation WFL   Bed Mobility   Supine to Sit 4  Minimal assistance   Additional items Assist x 1;HOB elevated; Increased time required;Verbal cues   Sit to Supine 4  Minimal assistance   Additional items Assist x 1;Verbal cues   Transfers   Sit to Stand 4  Minimal assistance   Additional items Assist x 1;Verbal cues  (BP seated: 114/66)   Stand to Sit 4  Minimal assistance   Additional items Assist x 1;Verbal cues   Ambulation/Elevation   Gait pattern Decreased L stance;Decreased foot clearance; Step to   Gait Assistance 4  Minimal assist   Additional items Assist x 1;Verbal cues   Assistive Device Rolling walker Distance 5' laterally at [facility]   Balance   Static Sitting Good   Dynamic Sitting Good   Static Standing Fair -   Dynamic Standing Fair -   Ambulatory Fair -   Activity Tolerance   Activity Tolerance Patient limited by fatigue   Nurse Made Aware T[place]ha   Assessment   Prognosis Good   Problem List Decreased range of motion;Decreased strength;Decreased endurance; Impaired balance;Decreased mobility; Decreased skin integrity   Assessment pt is a 55y/o m who presents s/p L ant THR 12/1/17  ordered WBAT L LE c no restrictions  PMH significant for arthritis, chronic pain, GERD, + HTN  at baseline, pt (I) c functional mobility s AD  resides c spouse in 2 story home c 1 ARYAN + 12 steps to 2nd floor c rail  currently requires min (A)x1 for functional mobility tasks 2* deficits in strength, ROM, balance, gait quality + activity tolerance noted in PT exam above  Barthel Index 50/100  min verbal cues required for safe transfer technique  ambulated 5' laterally at EOB c RW for improved stability + min verbal cues for sequencing  /66  would benefit from skilled PT to maximize functional mobility + return to PLOF  upon d/c, recommend hhpt  PT eval of high complexity 2* unstable med status s/p L ant THR  pt c multiple co-morbidities impacting PT + presents c significant decline in mobility from baseline requiring use of RW  will need to negotiate 12 steps for safe d/c home  pt also has multiple lines  Barriers to Discharge Inaccessible home environment   Goals   Patient Goals "to go home"  STG Expiration Date 12/06/17   Short Term Goal #1 1   increase strength 1/2 grade, 2  perform bed mobility mod (I), 3  safely perform transfers mod (I), 4  ambulate 300' mod (I) c RW c improved gait quality to safely navigate home environment, 5  negotiate 12 steps mod (I)  c appropriate LE sequencing technique 100% of the time to safely access 2nd floor of home   Plan   Treatment/Interventions Functional transfer training;LE strengthening/ROM; Elevations; Therapeutic exercise; Endurance training;Patient/family training;Equipment eval/education; Bed mobility;Gait training;Spoke to nursing;Family   PT Frequency 7x/wk; Twice a day   Recommendation   Recommendation Home PT   PT - OK to Discharge No   Barthel Index   Feeding 10   Bathing 5   Grooming Score 5   Dressing Score 5   Bladder Score 0   Bowels Score 10   Toilet Use Score 5   Transfers (Bed/Chair) Score 10   Mobility (Level Surface) Score 0   Stairs Score 0   Barthel Index Score 50     Alina Birmingham, PT

## 2017-12-01 NOTE — OP NOTE
OPERATIVE REPORT  PATIENT NAME: Debby Rivera    :  1960  MRN: [id number]  Pt Location: [facility] OR ROOM 02    Operative Note    Debby Rivera  2017    Pre-op Diagnosis:   Left Hip Osteoarthritis    Post-op Diagnosis:   Left Hip Osteoarthritis    Procedure:  Left Hip Replacement    Surgeon:  Calista Vela MD    Assistants:  ELIZABETH Nguyen CFA       Anesthesia:  Spinal      Components:     Implant Name Type Inv  Item Serial No   Lot No  LRB No  Used   SHELL ACETABULAR 54MM POROUS SOLID LCK RING PINNACLE - [id number]  SHELL ACETABULAR 54MM POROUS SOLID LCK RING PINNACLE  [id number] Left 1   LINER ACTB ULT LNK PE 36 X 54MM 0DEG NEUTRAL ALTRX - [id number]  LINER ACTB ULT LNK PE 36 X 54MM 0DEG NEUTRAL ALTRX  DEPUY [id number] Left 1   SCREW IGOR 6 5 X 20MM SLF TAP PINNACLE - [id number]  SCREW IGOR 6 5 X 20MM SLF TAP PINNACLE  DEPUY [id number] Left 1   STEM FEM PRESS FIT  TAPER SZ 12 COLR STD CORAIL - [id number]  STEM FEM PRESS FIT  TAPER SZ 12 COLR STD CORAIL  DEPUY [id number] Left 1   HEAD FEMORAL CMNTLS  TPR 36MM +5MM BIOLOX DELTA ARTICULEZE - [id number]   HEAD FEMORAL CMNTLS  TPR 36MM +5MM BIOLOX DELTA ARTICULEZE   DEPUY [id number] Left 1       APPROACH:   Direct anterior  INDICATION: Debby Rivera is a 59 y o  male with advanced osteoarthritis of the Left hip, which was refractory to nonoperative therapy  He understood the risks and benefits of hip replacement and wished to proceed  DESCRIPTION OF PROCEDURE: On 2017 the patient was brought to the holding area  The identity and surgical site were confirmed by him and the surgeon  Perioperative intravenous antibiotics were given  Anesthesia was administered by the anesthesia team  The leg length examination was performed with him in the supine position after anesthesia  The operative extremity was shorter by about 8mm   Then the patient was positioned on the HANA table and preoperative fluoroscopic views were obtained  Then the Left extremity was prepped and draped in the usual sterile manner  A direct anterior approach was performed to the hip in the usual manner, exposing the capsule and doing a capsulotomy  A femoral neck osteotomy was made  The head was removed  Acetabulum was exposed and reamed sequentially and eventually underreamed by 1mm for the impaction of the appropriately sized cup with excellent stability to host bone  This was impacted under direct fluoroscopic guidance to make sure we were satisfied with the position  Also, there was good coverage anteriorly to prevent any impingement  One screw was placed in the posterior-superior quadrant  Osteophytes were removed circumferentially from the acetabulum  Then the polyethylene liner was impacted into the cup and it was down all the way around  Attention was then turned to the femur  Appropriate releases were made  The leg was dropped into hyperextension, external rotation  The canal was accessed without difficulty  It was broached to fit the appropriate size stem, which gave excellent stability within the host bone, both axially and rotationally  We used the reamers distally to prevent any potting  We used the calcar planer as well until we were satisfied with the position of the broach relative to the proximal femur  We trialed  We were able to achieve good stability and good restoration of leg lengths and the stem size and neck were chosen as 12KA (the KA was chosen b/c it fit his preop anatomy best and also he did well with this on the other side)  Therefore, trial components were removed  The femoral canal was irrigated and the stem was impacted until it reached a firm endpoint, and it had excellent stability within the host bone, both axially and rotationally   We trialed with the different ball options and the 5 ball was chosen based on stabililty, measurement of leg lengths by fluoroscopic views, and the soft tissue tension to be appropriate and not excessively lax or tight  With that, there was excellent stability as the hip was able to come to 120 degrees of external rotation, in neutral, it was able to come to 100 degrees of external rotation, 30 degrees of hyperextension, had a firm endpoint posteriorly and the soft tissue tension was appropriate without being excessively lax or tight  Also, the leg length restoration appeared to be appropriate given the fluoroscopic measurements and the preoperative leg length examination  The 1 5 ball was lax with reduction and dislocation maneuvers, and there was instability in maximal ER, which was not acceptable  Therefore, the 5 ball was chosen and impacted with appropriate force onto a clean trunnion with 3 blows of the mallet  The hip was then reduced  It was irrigated copiously with several liters of pulsed lavage  A periarticular injection was injected for postoperative analgesia  The capsulotomy and the fascia were closed with #1 Vicryl suture  The skin was closed using Vicryl sutures, staples, skin adhesive and a sterile dressing  The patient was then awakened and taken to the recovery room  The assistance of Christina Rivera was essential as no qualified resident assistance was available  XRAY REPORT: Postoperative x-rays including an AP view of the Left hip as well as fluoroscopic views of the Left hip taken intraoperatively on 12/1/2017 were checked and revealed a Left hip replacement in good alignment with no fracture or dislocation         Filiberto Martin MD     Date: 12/1/2017  Time: 4:36 PM

## 2017-12-02 VITALS
OXYGEN SATURATION: 97 % | RESPIRATION RATE: 16 BRPM | HEART RATE: 97 BPM | HEIGHT: 70 IN | BODY MASS INDEX: 26.57 KG/M2 | DIASTOLIC BLOOD PRESSURE: 72 MMHG | WEIGHT: 185.6 LBS | SYSTOLIC BLOOD PRESSURE: 132 MMHG | TEMPERATURE: 97.6 F

## 2017-12-02 LAB
ANION GAP SERPL CALCULATED.3IONS-SCNC: 12 MMOL/L (ref 4–13)
BUN SERPL-MCNC: 10 MG/DL (ref 5–25)
CALCIUM SERPL-MCNC: 8.4 MG/DL (ref 8.3–10.1)
CHLORIDE SERPL-SCNC: 116 MMOL/L (ref 100–108)
CO2 SERPL-SCNC: 29 MMOL/L (ref 21–32)
CREAT SERPL-MCNC: 0.91 MG/DL (ref 0.6–1.3)
GFR SERPL CREATININE-BSD FRML MDRD: 92 ML/MIN/1.73SQ M
GLUCOSE SERPL-MCNC: 108 MG/DL (ref 65–140)
HCT VFR BLD AUTO: 37.6 % (ref 36.5–49.3)
HGB BLD-MCNC: 12.6 G/DL (ref 12–17)
POTASSIUM SERPL-SCNC: 5.1 MMOL/L (ref 3.5–5.3)
SODIUM SERPL-SCNC: 157 MMOL/L (ref 136–145)

## 2017-12-02 PROCEDURE — 97110 THERAPEUTIC EXERCISES: CPT

## 2017-12-02 PROCEDURE — 80048 BASIC METABOLIC PNL TOTAL CA: CPT | Performed by: PHYSICIAN ASSISTANT

## 2017-12-02 PROCEDURE — 97116 GAIT TRAINING THERAPY: CPT

## 2017-12-02 PROCEDURE — 85014 HEMATOCRIT: CPT | Performed by: PHYSICIAN ASSISTANT

## 2017-12-02 PROCEDURE — 85018 HEMOGLOBIN: CPT | Performed by: PHYSICIAN ASSISTANT

## 2017-12-02 RX ORDER — TRAMADOL HYDROCHLORIDE 50 MG/1
50 TABLET ORAL EVERY 6 HOURS PRN
Qty: 40 TABLET | Refills: 0 | Status: SHIPPED | OUTPATIENT
Start: 2017-12-02 | End: 2017-12-12

## 2017-12-02 RX ORDER — ASPIRIN 81 MG/1
81 TABLET, CHEWABLE ORAL 2 TIMES DAILY
Qty: 120 TABLET | Refills: 0 | Status: SHIPPED | OUTPATIENT
Start: 2017-12-02 | End: 2018-06-05 | Stop reason: ALTCHOICE

## 2017-12-02 RX ORDER — CELECOXIB 200 MG/1
200 CAPSULE ORAL DAILY
Qty: 30 CAPSULE | Refills: 1 | Status: SHIPPED | OUTPATIENT
Start: 2017-12-02 | End: 2018-06-05 | Stop reason: ALTCHOICE

## 2017-12-02 RX ORDER — SENNOSIDES 8.6 MG
1 TABLET ORAL DAILY
Qty: 120 EACH | Refills: 0 | Status: SHIPPED | OUTPATIENT
Start: 2017-12-02 | End: 2018-06-05 | Stop reason: ALTCHOICE

## 2017-12-02 RX ORDER — OXYCODONE HYDROCHLORIDE 5 MG/1
5-10 TABLET ORAL EVERY 4 HOURS PRN
Qty: 60 TABLET | Refills: 0 | Status: SHIPPED | OUTPATIENT
Start: 2017-12-02 | End: 2017-12-12

## 2017-12-02 RX ADMIN — KETOROLAC TROMETHAMINE 30 MG: 30 INJECTION, SOLUTION INTRAMUSCULAR at 08:56

## 2017-12-02 RX ADMIN — PANTOPRAZOLE SODIUM 40 MG: 40 TABLET, DELAYED RELEASE ORAL at 05:00

## 2017-12-02 RX ADMIN — CELECOXIB 200 MG: 200 CAPSULE ORAL at 08:55

## 2017-12-02 RX ADMIN — SENNOSIDES 8.6 MG: 8.6 TABLET, FILM COATED ORAL at 08:55

## 2017-12-02 RX ADMIN — CEFAZOLIN SODIUM 1000 MG: 1 SOLUTION INTRAVENOUS at 03:04

## 2017-12-02 RX ADMIN — OXYCODONE HYDROCHLORIDE 5 MG: 5 TABLET ORAL at 12:03

## 2017-12-02 RX ADMIN — ASPIRIN 81 MG 81 MG: 81 TABLET ORAL at 08:55

## 2017-12-02 RX ADMIN — KETOROLAC TROMETHAMINE 30 MG: 30 INJECTION, SOLUTION INTRAMUSCULAR at 03:03

## 2017-12-02 RX ADMIN — LOSARTAN POTASSIUM 100 MG: 50 TABLET, FILM COATED ORAL at 08:55

## 2017-12-02 NOTE — PROGRESS NOTES
Progress Note - Internal Medicine   Ari Suggs 59 y o  male MRN: [id number]  Unit/Bed#: E2 -01 Encounter: [id number]      Impression :  Postoperative day 1 , status post left total hip replacement by Dr Jack Mcdowell  Advanced end-stage degenerative joint disease, left hip  Ambulatory dysfunction  Incomplete right bundle branch block preoperative electrocardiogram  Hypertension  Gastroesophageal reflux disease  Chronic lower back pain  Sodium of 157, potassium of 5 1, etiology unclear need to rule out any spurious lab order    Recommendation:    Patient seems to be medically stable for discharge but I am not sure about his lab work from this morning  I would like to recheck it and make sure that this is not just falls lab better  If these labs are within normal limit then patient can go home  Discussed with patient's RN, Nenita Valenzuela and he will draw stat lab work now  And call me this any abnormalities  Continue aspirin 81 mg b i d  as recommended by Dr Jack Mcdowell, Cozaar 100 mg p o  daily, his blood pressure is stable at 130 2/72  Discussed with patient's wife in detail and he will continue follow-up postoperative care as per Dr Jack Mcdowell continue incentive spirometry and use DVT prophylaxis as recommended by him  Patient will follow up with his outpatient primary care physician regarding his above enumerated medical problems  Subjective:     Patient seen and examined today  EMR and overnight events reviewed  Resting comfortably and had no difficulty overnight, he has participated with therapy team climb steps and has been cleared by them for discharge  He has no pain at present very minimal probably about 2/10 on numeric pain scale which also improved with pain medications  He has no dysuria no frequency has been taking analgesics without any issues    He is looking forward to going home but is lab work from this morning drawn at 4:51 a m  shows slight hypernatremia at 157 a m  and hyperkalemia at 5 1 but patient appears clinically to be well hydrated  Review of Systems     Constitutional: Denies appetite change  No chills, diaphoresis, fatigue or fever  HEENT: No congestion, sore throat  No visual complaints  Respiratory: No cough, chest tightness, shortness of breath and wheezing  Cardiovascular: No chest pain and palpitations  No Syncope or grey out  GI: Negative for abdominal distention, pain, constipation, diarrhea or nausea  Endocrine: Negative for cold or heat intolerance, polydipsia and polyuria  Genitourinary: Negative for decreased urine volume, dysuria, flank pain and urgency  Skin: Negative for rash  Neurological: Negative for dizziness, weakness, numbness and headaches  Hematological: Negative for spontaneous bruising or bleeding  Psychiatric: Negative for confusion, dysphoric mood, hallucinations  All other systems reviewed and are negative  OBJECTIVE:     Vitals:     HR:  [74-97] 97  Resp:  [10-16] 16  BP: (102-132)/(54-74) 132/72  SpO2:  [94 %-98 %] 97 %  Temp (24hrs), Av 4 °F (36 3 °C), Min:96 5 °F (35 8 °C), Max:98 °F (36 7 °C)  Current: Temperature: 97 6 °F (36 4 °C)    Intake/Output Summary (Last 24 hours) at 17 1337  Last data filed at 17 2231   Gross per 24 hour   Intake          1608 34 ml   Output             2775 ml   Net         -1166 66 ml     Body mass index is 26 63 kg/m²  Physical Exam      General Appearance:  Awake,alert, cooperative  Not in distress  Pallor / Icterus/ Cyanosis negative  Oropharynx no thrush  Tongue protrudes in midline  Head:  Normocephalic, atraumatic  No titubations  Eyes:  No eye redness or discharge bilaterally  Neck: Supple, no raised JVP  Back:   Symmetric, no kypho-scoliosis  Lungs:   B/L Clear to auscultation, no wheezing or rhonchi  Normal broncho-alveolar air entry  No pleural rubs  Heart:   Regular S1S2, A2P2 normal, no murmur or gallop      Abdomen:   Soft, non-tender,no rebound, bowel sounds+  Musculoskeletal: Extremities no cyanosis or edema  Surgical site on the left hip has clean dressing  No discharge or drainage  Mild diminution and limited range of motion   Psych: Normal Affect / No hallucinations or delusions  Neurologic:             Skin:  Endocrine:  Vascular: Awake and alert  Mentation intact  Speech is intact  Facial symmetry is maintained  Good shoulder shrug and hand grasp  Muscle strength is 5/5 in all muscle groups except on operated left lower limb which is limited due to recent surgery  Light touch and temperature sensation is maintained  No rashes /purpura  No open wounds  No thyromegaly / no lid lag  Homans sign is negative  B/L Calf are supple and non tender         Labs, Imaging, & Other studies:    All pertinent labs and imaging studies were personally reviewed    Results from last 7 days  Lab Units 12/02/17  0452   HEMOGLOBIN g/dL 12 6       Results from last 7 days  Lab Units 12/02/17  0451   SODIUM mmol/L 157*   POTASSIUM mmol/L 5 1   CHLORIDE mmol/L 116*   CO2 mmol/L 29   ANION GAP mmol/L 12   BUN mg/dL 10   CREATININE mg/dL 0 91   EGFR ml/min/1 73sq m 92   GLUCOSE RANDOM mg/dL 108   CALCIUM mg/dL 8 4           Current Meds:  Current Facility-Administered Medications   Medication Dose Route Frequency Provider Last Rate Last Dose    acetaminophen (TYLENOL) tablet 650 mg  650 mg Oral Q6H PRN Lanny Mcdowell PA-C        aspirin chewable tablet 81 mg  81 mg Oral BID Abhishek Bullock PA-C   81 mg at 12/02/17 0855    calcium carbonate (TUMS) chewable tablet 1,000 mg  1,000 mg Oral Daily PRN Lanny Mcdowell PA-C        celecoxib (CeleBREX) capsule 200 mg  200 mg Oral Daily PAL Rolon-MARIE   200 mg at 12/02/17 0855    cyclobenzaprine (FLEXERIL) tablet 10 mg  10 mg Oral TID PRN Lanny Mcdowell PA-C        HYDROmorphone (DILAUDID) 1 mg/mL injection 0 2 mg  0 2 mg Intravenous Q3H PRN Lanny Mcdowell PA-C        HYDROmorphone (DILAUDID) 1 mg/mL injection 0 5 mg  0 5 mg Intravenous Q1H PRN Vanda Chappell, DO        ketorolac (TORADOL) 30 mg/mL injection 30 mg  30 mg Intravenous Q6H PRN Danielle Martel PA-C   30 mg at 12/02/17 2733    lactated ringers infusion  125 mL/hr Intravenous Continuous Danielle Martel PA-C 125 mL/hr at 12/02/17 0338 125 mL/hr at 12/02/17 0338    losartan (COZAAR) tablet 100 mg  100 mg Oral Daily Danielle Martel PA-C   100 mg at 12/02/17 0855    ondansetron (ZOFRAN) injection 4 mg  4 mg Intravenous Q6H PRN Danielle Martel PA-C        oxyCODONE (ROXICODONE) immediate release tablet 10 mg  10 mg Oral Q4H PRN Danielle Martel PA-C        oxyCODONE (ROXICODONE) IR tablet 5 mg  5 mg Oral Q4H PRN Danielle Martel PA-C   5 mg at 12/02/17 1203    pantoprazole (PROTONIX) EC tablet 40 mg  40 mg Oral Early Morning Danielle Martel PA-C   40 mg at 12/02/17 0500    senna (SENOKOT) tablet 8 6 mg  1 tablet Oral Daily Danielle Martel PA-C   8 6 mg at 12/02/17 8195    simethicone (MYLICON) chewable tablet 80 mg  80 mg Oral 4x Daily PRN Danielle Martel PA-C        sodium chloride 0 9 % infusion  125 mL/hr Intravenous Continuous Lashaun Brown DO   Stopped at 12/01/17 1332    traMADol (ULTRAM) tablet 50 mg  50 mg Oral Q6H PRN Danielle Martel PA-C         Home Meds:  Prescriptions Prior to Admission   Medication    cyclobenzaprine (FLEXERIL) 10 mg tablet    losartan (COZAAR) 100 MG tablet    Multiple Vitamin (MULTIVITAMIN) tablet    naproxen (NAPROSYN) 500 mg tablet    pantoprazole (PROTONIX) 40 mg tablet       Continuous Infusions:    lactated ringers 125 mL/hr Last Rate: 125 mL/hr (12/02/17 0338)   sodium chloride 125 mL/hr Last Rate: Stopped (12/01/17 1332)       Invasive Devices          No matching active lines, drains, or airways          VTE Pharmacologic Prophylaxis:  Aspirin 81 mg b i d  as per Primary Ortho Team   VTE Mechanical Prophylaxis: sequential compression device    Portions of the record may have been created with voice recognition software   Occasional wrong word or "sound a like" substitutions may have occurred due to the inherent limitations of voice recognition software  Read the chart carefully and recognize, using context, where substitutions have occurred

## 2017-12-02 NOTE — DISCHARGE SUMMARY
DISCHARGE SUMMARY      Patient Name: Tosha Arevalo  Patient MRN: [id number]  Admitting Provider: Ariana Jean Baptiste MD  Discharging Provider: Ariana Jean Baptiste MD  [phone]  Admission Date: 12/1/2017       Discharge Date: 12/2/17    Admission Diagnosis  Primary osteoarthritis of left hip [M16 12]    Discharge Diagnoses  [facility] Course  Tosha Arevlao was admitted to [facility] on 12/1/2017, with diagnosis of osteoarthritis in his Left hip  On the day of admission, he was brought to surgery, successfully underwent a Left hip replacement  He tolerated the procedure well  Following surgery, he was taken to the recovery room, at which time, there was a consult placed for Dr Joseph Greer for the patient's medical management  After a short stay in the recovery room, he was transferred to the orthopedic floor at which time, he was resumed on his routine home medications per the hospitalist group  On postop day #1, he was able to start some physical therapy and was able to tolerate it well  At this time, he was in stable condition, showing no sign of deep vein thrombosis or pulmonary embolism  Incision was healing well at the time and showed no signs of infection  DISCHARGE DIAGNOSIS: Primary osteoarthritis of left hip [M16 12]  He is now status post Left total hip replacement, which he underwent during the hospital stay  Medications  See after visit summary for reconciled discharge medications provided to patient and family  Allergies  No Known Allergies    Outpatient Follow-Up  No future appointments      Discharge Disposition  home    Operative Procedures Performed  Procedure(s):  ARTHROPLASTY HIP TOTAL ANTERIOR        Verblanca Saravia PA-C    DISCHARGE SUMMARY

## 2017-12-02 NOTE — PLAN OF CARE
Problem: PHYSICAL THERAPY ADULT  Goal: Performs mobility at highest level of function for planned discharge setting  See evaluation for individualized goals  Treatment/Interventions: Functional transfer training, LE strengthening/ROM, Elevations, Therapeutic exercise, Endurance training, Patient/family training, Equipment eval/education, Bed mobility, Gait training, Spoke to nursing, Family          See flowsheet documentation for full assessment, interventions and recommendations  Outcome: Adequate for Discharge  Prognosis: Excellent  Problem List: Decreased strength  Assessment: The pt  has much improved mobility today as he is ambulating beyond community distances without difficulty  He also was able to complete stairs without difficulty  He was able to demonstrate step-through gait sequencing with only minimal pain  The pt  has demonstrated the necessary mobility in order to safely return home at discharge  Barriers to Discharge: None     Recommendation: Home PT, Home with family support     PT - OK to Discharge: Yes    See flowsheet documentation for full assessment

## 2017-12-02 NOTE — PROGRESS NOTES
Orthopedic Total Hip Progress Note      Subjective     Status post-Left total hip arthroplasty  Systemic or Specific Complaints: No Complaints    Objective     Vital signs in last 24 hours:  Temp:  [96 2 °F (35 7 °C)-98 °F (36 7 °C)] 97 6 °F (36 4 °C)  HR:  [69-99] 97  Resp:  [9-16] 16  BP: ()/(54-93) 132/72    Neurovascular: Capillary refill: Normal  Dorsiflexion: 5/5  Plantarflexion:  5/5   Wound: Dressing:  clean/dry/intact   DVT Exam: No evidence of DVT seen on physical exam      Data Review:  CBC:   Lab Results   Component Value Date    WBC 7 43 11/15/2017    RBC 4 47 11/15/2017    HGB 12 6 12/02/2017    HCT 37 6 12/02/2017     11/15/2017       Assessment/Plan     Status post-Left total hip arthroplasty: Doing well postoperatively      Discharge today, Return to Clinic: AS SCHEDULED IF CLEARED BY THERAPY AND MEDICINE    Activity: ad luis antonio      Ute Lockhart PA-C    Date: 12/2/2017  Time: 7:42 AM

## 2017-12-02 NOTE — SOCIAL WORK
CM knows this patient from prior R THR done this past May  The patient lives in a two story home with his wife, Angella Welch  He has 1 step to enter his home, 12 steps to the second floor  He is Independent with ADLs and Mobility  Has all equipment needed for discharge (RW, BSC, Shower Chair, Nikki Contorion)  He drives  Works [employer]  Hx of VNA with [facility] for past surgery - requests to have PT Ari Ramon again  Cm requested this PT in referral to  VNA  Uses the [facility] on [address] in [place] for rx needs  No other needs addressed at this time  Patient set up with services for discharge

## 2017-12-02 NOTE — CASE MANAGEMENT
[facility] in the [facility] by [address] for 2017  Network Utilization Review Department  Phone: [phone]; Fax [phone]  ATTENTION: The Network Utilization Review Department is now centralized for our 7 Facilities  Make a note that we have a new phone and fax numbers for our Department  Please call with any questions or concerns to [phone] and carefully follow the prompts so that you are directed to the right person  All voicemails are confidential  Fax any determinations, approvals, denials, and requests for initial or continue stay review clinical to [phone]  Due to HIGH CALL volume, it would be easier if you could please send faxed requests to expedite your requests and in part, help us provide discharge notifications faster  Initial Clinical Review    Age/Sex: 59 y o  male    Surgery Date: 12/1/17    Procedure: Left Hip Replacement    Anesthesia:SPINAL    Admission Orders: Date/Time/Statement: 12/1/17 @ 1212     Orders Placed This Encounter   Procedures    Inpatient Admission     Standing Status:   Standing     Number of Occurrences:   1     Order Specific Question:   Admitting Physician     Answer:   Dakota Falcon     Order Specific Question:   Level of Care     Answer:   Med Surg [16]     Order Specific Question:   Estimated length of stay     Answer:   Inpatient Only Surgery       Vital Signs: /72   Pulse 97   Temp 97 6 °F (36 4 °C) (Tympanic)   Resp 16   Ht 5' 10" (1 778 m)   Wt 84 2 kg (185 lb 9 6 oz)   SpO2 97%   BMI 26 63 kg/m²     Diet:        Diet Orders            Start     Ordered    12/02/17 0929  Room Service  Once     Question:  Type of Service  Answer:  Room Service-Appropriate    12/02/17 4072    12/01/17 0409  Diet Regular; Regular House  Diet effective now     Question Answer Comment   Diet Type Regular    Regular Regular House    RD to adjust diet per protocol?  Yes        12/01/17 9395 Mobility: ACTIVITY AS HEMAL    DVT Prophylaxis: SEQ COMP DEVICE    Pain Control:   Pain Medications             cyclobenzaprine (FLEXERIL) 10 mg tablet Take 10 mg by mouth 3 (three) times a day as needed for muscle spasms    naproxen (NAPROSYN) 500 mg tablet Take 250 mg by mouth 2 (two) times a day as needed for mild pain    aspirin 81 mg chewable tablet Chew 1 tablet 2 (two) times a day    celecoxib (CeleBREX) 200 mg capsule Take 1 capsule by mouth daily    oxyCODONE (ROXICODONE) 5 mg immediate release tablet Take 1-2 tablets by mouth every 4 (four) hours as needed for moderate pain for up to 10 days Max Daily Amount: 60 mg    traMADol (ULTRAM) 50 mg tablet Take 1 tablet by mouth every 6 (six) hours as needed for moderate pain for up to 10 days        INPT Stacy@[employer]  TEDS  PT/OT  NEUROVASCULAR CHECKS  WEIGHT BEARING AS HEMAL  D/C Di@[employer]  Scheduled Meds:  aspirin 81 mg Oral BID   celecoxib 200 mg Oral Daily   losartan 100 mg Oral Daily   pantoprazole 40 mg Oral Early Morning   senna 1 tablet Oral Daily     Continuous Infusions:  lactated ringers 125 mL/hr Last Rate: 125 mL/hr (12/02/17 8717)   sodium chloride 125 mL/hr Last Rate: Stopped (12/01/17 1332)     PRN Meds:   acetaminophen    calcium carbonate    cyclobenzaprine    dexamethasone    diphenhydrAMINE X1    HYDROmorphone    HYDROmorphone    ketorolac    metoclopramide    nalbuphine    naloxone    ondansetron    ondansetron    oxyCODONE X1    oxyCODONE    simethicone    traMADol

## 2017-12-02 NOTE — PROGRESS NOTES
Patient was made aware of an elevated sodium in his morning lab work  Patient will follow up with PCP

## 2017-12-02 NOTE — PHYSICAL THERAPY NOTE
Physical Therapy Progress Note     12/02/17 0850   Pain Assessment   Pain Assessment 0-10   Pain Score 2   Pain Type Surgical pain   Pain Location Hip   Pain Orientation Left   Hospital Pain Intervention(s) Cold applied;Repositioned; Ambulation/increased activity   Response to Interventions Good  Restrictions/Precautions   LLE Weight Bearing Per Order WBAT   Other Precautions Pain   Subjective   Subjective The pt  states that he was up in the chair this morning, and he is eager to return home today  Bed Mobility   Supine to Sit 4  Minimal assistance   Additional items Assist x 1;LE management   Sit to Supine 5  Supervision   Transfers   Sit to Stand 6  Modified independent   Stand to Sit 6  Modified independent   Ambulation/Elevation   Gait pattern WNL   Gait Assistance 6  Modified independent   Assistive Device Rolling walker   Distance 320 feet x 2  Stair Management Assistance 6  Modified independent   Additional items Verbal cues   Stair Management Technique One rail L;Step to pattern; Foreward;Nonreciprocal   Number of Stairs 14   Balance   Static Sitting Normal   Dynamic Sitting Normal   Static Standing Good   Ambulatory Good   Activity Tolerance   Activity Tolerance Patient tolerated treatment well   Nurse Maritza Chamberlain RN  Exercises   THR Supine;Left;AROM;AAROM;20 reps   Assessment   Prognosis Excellent   Problem List Decreased strength   Assessment The pt  has much improved mobility today as he is ambulating beyond community distances without difficulty  He also was able to complete stairs without difficulty  He was able to demonstrate step-through gait sequencing with only minimal pain  The pt  has demonstrated the necessary mobility in order to safely return home at discharge  Barriers to Discharge None   Goals   Patient Goals To go home today     STG Expiration Date 12/06/17   Treatment Day 1   Plan   Treatment/Interventions Functional transfer training;LE strengthening/ROM; Elevations; Therapeutic exercise; Endurance training;Patient/family training;Bed mobility;Gait training   Progress Improving as expected   PT Frequency Twice a day   Recommendation   Recommendation Home PT; Home with family support   PT - OK to Discharge Yes     Lyn Gleason, PTA

## 2017-12-02 NOTE — DISCHARGE INSTRUCTIONS
[facility]   Post Operative Joint Replacement Instructions   Dr Sanjiv Rice Office [address] [phone]     Remove dressing post op day 7  Home Health care will remove the staples/sutures post op day 14  MEDICATIONS      *  Enteric Coated Aspirin 81 mg:  Start taking this twice daily on discharge and continue for 6 weeks  Continue as you were in the hospital/rehab facility     * Iron: Continue the iron supplement twice daily  If you experience nausea or constipation with the medicine discontinue its use  Contact us if the nausea persists  * Pain Medications: Your prescriptions may run out before you return for your next visit  Please give us two regular office day's notice before you run out, to prevent any problems of not having pain medicine  Be advised that prescriptions for Percocet and OxyContin cannot be phoned to your pharmacy  They will need to be picked up at our office  Please refrain from using alcohol with your pain medicines  Percocet contains 325 mg of Tylenol  You should not exceed 3000 mg of Tylenol in a day  Please be careful to not overdose the Tylenol  * Herbal Medicines: Please hold all these preparations until after your first post-operative visit  Unless specifically directed otherwise  ACTIVITY   * Your weight bearing status is: as tolerated     * If you have been furnished with an assistive device  Please feel free to try to wean from using it under the supervision of your therapist      * You may participate in activity as tolerated  It is likely that you will tire more easily for a time after surgery  Please rest when you need it to help your healing process  * Stairs are not restricted for you  Please climb stairs as instructed by your physical therapist      * For hip and knee replacement patients please elevate your leg or legs while resting  This is important to prevent lower leg swelling       * When you are back at home you will likely have physical therapy three times a week  On the days you do not have formal therapy please perform the home exercises given to you  * If you had a hip replacement, please follow the safety precautions given to you by the nurse or therapist taking care of you  * Immediately following the surgery you are not permitted to drive until cleared by your surgeon  This typically does not happen until your first visit after surgery  * Knee replacement patients may be passengers in a vehicle following their discharge from the hospital      * Hip replacement patients are not allowed in a vehicle, except for your trip home and your first follow up visit  Please follow these guidelines unless specifically instructed to start outpatient therapy at an earlier time  * Please do not perform lifting tasks or strenuous domestic activities  * If you currently are employed, you are off work until cleared by your surgeon  Everyone's ability to return to work is determined by his or her abilities  Your return to work may take a few weeks to a few months  * Sexual activities are permitted as tolerated  NORMAL FINDINGS   * Numbness along the incision     * Mechanical click of a knee replacement  * Your incision area will feel warm  WOUND CARE   * It is OK to shower once there is no spotting or drainage for a full 24 hours  Please do not submerge the incision into a pool, bath or hot tub until after your 1st post-operative visit  * Please do not disturb your staples, sutures, or the scabs  It promotes better healing and smaller scars  * Cover the incision with gauze dressing until there is no further drainage  * You may leave the incision open to the air when there is no discharge left on the gauze at changing time  Once this occurs you may shower  * Please be generous with the use of ice  It is a very good remedy  Apply ice for only twenty minutes at a time   You may use it every hour  It is recommended to ice before and after anticipated activities, which includes exercise and physical therapy  * Wear your knee-high pressure stockings every day  They may be removed for sleep at night  Continue to wear them until you are seen for your first follow up  * For knee and hip replacements; your staples will be removed about 14 days after your surgery date  Home nurses and physical therapists typically remove them  If they are unable to, please call our office to have us do it for you  FOLLOW UP   * You should have a scheduled visit with your surgeon scheduled for three to four weeks after surgery  If you do not remember your appointment date and time, or if you are sure you do not have one, please call to set one up  * Please inform the office if you experience one of the following:    - Fever that is not responding to Tylenol and is above 101 degrees    - Redness of the skin that is increasing in area    - Your incision is soaking the dressings with drainage or blood    - You're unable to bear weight on your operative leg or legs

## 2017-12-08 NOTE — CASE MANAGEMENT
Notification of Discharge  This is a Notification of Discharge from our facility [address]  Please be advised that this patient has been discharge from our facility  Below you will find the admission and discharge date and time including the patients disposition  PRESENTATION DATE: 12/1/2017  8:14 AM  IP ADMISSION DATE: 12/1/17 1212  DISCHARGE DATE: 12/2/2017  1:00 PM  DISPOSITION: Home with [address] [place] [address] in the [employer] by [facility] Valladares for 2017  Network Utilization Review Department  Phone: [phone]; Fax [phone]  ATTENTION: The Network Utilization Review Department is now centralized for our 7 Facilities  Make a note that we have a new phone and fax numbers for our Department  Please call with any questions or concerns to [phone] and carefully follow the prompts so that you are directed to the right person  All voicemails are confidential  Fax any determinations, approvals, denials, and requests for initial or continue stay review clinical to [phone]  Due to HIGH CALL volume, it would be easier if you could please send faxed requests to expedite your requests and in part, help us provide discharge notifications faster

## 2018-01-11 NOTE — CONSULTS
Chief Complaint  Pt presents for pre-op clearance for hip surgery 12/01/2017 @ [facility]  History of Present Illness  Pre-Op Visit (Brief): The patient is being seen for Left total hip replacement  Surgical Risk Assessment:   Prior Anesthesia: He had prior anesthesia and no prior adverse reaction to general anesthesia  Pertinent Past Medical History: Hypertension  Exercise Capacity: able to walk two flights of stairs without symptoms  Lifestyle Factors: denies tobacco use and denies illegal drug use  Symptoms: no symptoms  Pertinent Family History: no pertinent family history  Living Situation: home is secure and supportive  HPI: Patient presents for preop clearance for left total hip replacement  His right hip was replaced approximately 6 months ago and he recovered well from that  His only chronic medical problem is hypertension which is well controlled on losartan 100 mg  Review of Systems    Constitutional: No fever or chills, feels well, no tiredness, no recent weight gain or weight loss  Eyes: No complaints of eye pain, no red eyes, no discharge from eyes, no itchy eyes  ENT: no complaints of earache, no hearing loss, no nosebleeds, no nasal discharge, no sore throat, no hoarseness  Cardiovascular: No complaints of slow heart rate, no fast heart rate, no chest pain, no palpitations, no leg claudication, no lower extremity  Respiratory: No complaints of shortness of breath, no wheezing, no cough, no SOB on exertion, no orthopnea or PND  Gastrointestinal: No complaints of abdominal pain, no constipation, no nausea or vomiting, no diarrhea or bloody stools  Genitourinary: No complaints of dysuria, no incontinence, no hesitancy, no nocturia, no genital lesion, no testicular pain  Musculoskeletal: as noted in HPI  Integumentary: No complaints of skin rash or skin lesions, no itching, no skin wound, no dry skin     Neurological: No compliants of headache, no confusion, no convulsions, no numbness or tingling, no dizziness or fainting, no limb weakness, no difficulty walking  Psychiatric: Is not suicidal, no sleep disturbances, no anxiety or depression, no change in personality, no emotional problems  Endocrine: No complaints of proptosis, no hot flashes, no muscle weakness, no erectile dysfunction, no deepening of the voice, no feelings of weakness  Hematologic/Lymphatic: No complaints of swollen glands, no swollen glands in the neck, does not bleed easily, no easy bruising  Active Problems    1  Acid reflux (530 81) (K21 9)   2  Acute gastritis (535 00) (K29 00)   3  Benign essential hypertension (401 1) (I10)   4  Chronic low back pain (724 2,338 29) (M54 5,G89 29)   5  Degenerative tear of acetabular labrum of right hip (715 95) (M16 11)   6  Diffuse arthralgia (719 40) (M25 50)   7  Enlarged prostate (600 00) (N40 0)   8  Esophageal reflux (530 81) (K21 9)   9  Need for immunization against influenza (V04 81) (Z23)   10  Osteoarthritis of right hip (715 95) (M16 11)   11  Pain of right hip joint (719 45) (M25 551)   12  Preop examination (V72 84) (Z01 818)   13  Primary localized osteoarthritis of right hip (715 15) (M16 11)   14  Sciatica (724 3) (M54 30)   15   Vitamin D deficiency (268 9) (E55 9)    Past Medical History    · History of Acute pain (338 19) (R52)   · History of Bilateral hip pain (719 45) (M25 551,M25 552)   · History of Dysphagia (787 20) (R13 10)   · History of Encounter for screening colonoscopy (V76 51) (Z12 11)   · History of Fatigue, unspecified type (780 79) (R53 83)   · History of Foot pain, unspecified laterality   · History of Groin pain (789 09) (R10 30)   · History of abdominal pain (V13 89) (S75 405)   · History of constipation (V12 79) (Z87 19)   · History of dermatitis (V13 3) (Z87 2)   · History of esophageal reflux (V12 79) (Z87 19)   · History of low back pain (V13 59) (Z87 39)   · History of Lipid screening (V77 91) (Z13 220)   · History of Neck pain (723 1) (M54 2)   · History of Need for immunization against influenza (V04 81) (Z23)   · History of Primary localized osteoarthritis of left hip (715 15) (M16 12)   · History of Screening for prostate cancer (V76 44) (Z12 5)   · History of Skin lesion (709 9) (L98 9)   · History of Special screening examination for neoplasm of prostate (V76 44) (Z12 5)   · History of Urinary frequency (788 41) (R35 0)    The active problems and past medical history were reviewed and updated today  Surgical History    The surgical history was reviewed and updated today  Family History    · Family history of Hypertension    · Family history of Hypertension    · Family history of Hypertension    · Family history of Diabetes   · Family history of Hypertension    Social History    · Former smoker (I19 91) (G45 092)  The social history was reviewed and updated today  Current Meds   1  Cyclobenzaprine HCl - 10 MG Oral Tablet; TAKE 1 TABLET 3 TIMES DAILY AS NEEDED; Therapy: 54Lsw5748 to (Evaluate:26Apr2017)  Requested for: 31Amm0047; Last   Rx:[id number] Ordered   2  Losartan Potassium 100 MG Oral Tablet; TAKE 1 TABLET DAILY (NEED APPOINTMENT); Therapy: 70NIF7748 to (Last [place]:[id number])  Requested for: 24Oct2017 Ordered   3  Naproxen 500 MG Oral Tablet; TAKE 1 TABLET Every twelve hours PRN; Therapy: [id number] to Recorded    The medication list was reviewed and updated today  Allergies    1  No Known Drug Allergies    Vitals  Signs    Temperature: 97 4 F, Tympanic  Heart Rate: 93  Pulse Quality: Normal  Respiration Quality: Normal  Respiration: 15  Systolic: 927, LUE, Sitting  Diastolic: 92, LUE, Sitting  Height: 5 ft 10 in  Weight: 186 lb 5 oz  BMI Calculated: 26 73  BSA Calculated: 2 03  O2 Saturation: 98  Pain Scale: 3    Physical Exam    Ears, Nose, Mouth, and Throat   External inspection of ears and nose: Normal     Otoscopic examination: Tympanic membranes translucent with normal light reflex   Canals patent without erythema  Hearing: Normal     Nasal mucosa, septum, and turbinates: Normal without edema or erythema  Lips, teeth, and gums: Normal, good dentition  Oropharynx: Normal with no erythema, edema, exudate or lesions  Neck   Neck: Supple, symmetric, trachea midline, no masses  Pulmonary   Respiratory effort: No increased work of breathing or signs of respiratory distress  Auscultation of lungs: Clear to auscultation  Cardiovascular   Auscultation of heart: Normal rate and rhythm, normal S1 and S2, no murmurs  Carotid pulses: 2+ bilaterally  Examination of extremities for edema and/or varicosities: Normal     Abdomen   Abdomen: Non-tender, no masses  Musculoskeletal   Gait and station: Abnormal   Antalgic gait  Inspection/palpation of digits and nails: Normal without clubbing or cyanosis  Inspection/palpation of joints, bones, and muscles: Normal     Range of motion: Normal     Stability: Normal     Muscle strength/tone: Normal     Skin   Skin and subcutaneous tissue: Normal without rashes or lesions  Neurologic   Cranial nerves: Cranial nerves 2-12 intact  Psychiatric   Judgment and insight: Normal     Orientation to person, place and time: Normal     Recent and remote memory: Intact      Mood and affect: Normal        Results/Data  (1) PT WITH INR [id number] 05:04PM EPIC, Provider   Test ordered by: [employer]     Test Name Result Flag Reference   INR 1 01  0 86-1 16   PT 13 3 seconds  12 1-14 4     (1) URINALYSIS w URINE C/S REFLEX (will reflex a microscopy if leukocytes, occult blood, or nitrites are not within normal limits) [id number] 05:04PM EPIC, Provider   Test ordered by: [employer]     Test Name Result Flag Reference   COLOR Yellow     CLARITY Clear     PH UA 5 5  4 5-8 0   LEUKOCYTE ESTERASE UA Negative  Negative   NITRITE UA Negative  Negative   PROTEIN UA Negative mg/dl  Negative   GLUCOSE UA Negative mg/dl  Negative   KETONES UA Negative mg/dl Negative   UROBILINOGEN UA 0 2 E U /dl  0 2, 1 0 E U /dl   BILIRUBIN UA Negative  Negative   BLOOD UA Negative  Negative, Trace-Intact   SPECIFIC GRAVITY UA 1 020  1 003-1 030     (1) CBC/PLT/DIFF [id number] 05:04PM [facility], Provider   Test ordered by: Angelo Gaby     Test Name Result Flag Reference   WBC COUNT 7 43 Thousand/uL  4 31-10 16   RBC COUNT 4 47 Million/uL  3 88-5 62   HEMOGLOBIN 14 5 g/dL  12 0-17 0   HEMATOCRIT 41 5 %  36 5-49 3   MCV 93 fL  82-98   MCH 32 4 pg  26 8-34 3   MCHC 34 9 g/dL  31 4-37 4   RDW 12 8 %  11 6-15 1   MPV 9 4 fL  8 9-12 7   PLATELET COUNT 842 Thousands/uL  149-390   nRBC AUTOMATED 0 /100 WBCs     NEUTROPHILS RELATIVE PERCENT 51 %  43-75   LYMPHOCYTES RELATIVE PERCENT 36 %  14-44   MONOCYTES RELATIVE PERCENT 8 %  4-12   EOSINOPHILS RELATIVE PERCENT 4 %  0-6   BASOPHILS RELATIVE PERCENT 1 %  0-1   NEUTROPHILS ABSOLUTE COUNT 3 84 Thousands/? ??L  1 85-7 62   LYMPHOCYTES ABSOLUTE COUNT 2 67 Thousands/? ??L  0 60-4 47   MONOCYTES ABSOLUTE COUNT 0 58 Thousand/? ??L  0 17-1 22   EOSINOPHILS ABSOLUTE COUNT 0 30 Thousand/? ??L  0 00-0 61   BASOPHILS ABSOLUTE COUNT 0 04 Thousands/? ??L  0 00-0 10   This is a patient instruction: This test is non-fasting  Please drink two glasses of water morning of bloodwork  (1) COMPREHENSIVE METABOLIC PANEL [id number] 96:00TE EPIC, Provider   Test ordered by: Angelo Gaby     Test Name Result Flag Reference   GLUCOSE,RANDM 94 mg/dL     If the patient is fasting, the ADA then defines impaired fasting glucose as > 100 mg/dL and diabetes as > or equal to 123 mg/dL  Specimen collection should occur prior to Sulfasalazine administration due to the potential for falsely depressed results  Specimen collection should occur prior to Sulfapyridine administration due to the potential for falsely elevated results     SODIUM 142 mmol/L  136-145   POTASSIUM 4 0 mmol/L  3 5-5 3   CHLORIDE 103 mmol/L  100-108   CARBON DIOXIDE 29 mmol/L  21-32   ANION GAP (CALC) 10 mmol/L  4-13   BLOOD UREA NITROGEN 20 mg/dL  5-25   CREATININE 1 12 mg/dL  0 60-1 30   Standardized to IDMS reference method   CALCIUM 9 4 mg/dL  8 3-10 1   BILI, TOTAL 0 52 mg/dL  0 20-1 00   ALK PHOSPHATAS 57 U/L     ALT (SGPT) 53 U/L  12-78   Specimen collection should occur prior to Sulfasalazine administration due to the potential for falsely depressed results  AST(SGOT) 29 U/L  5-45   Specimen collection should occur prior to Sulfasalazine administration due to the potential for falsely depressed results  ALBUMIN 4 6 g/dL  3 5-5 0   TOTAL PROTEIN 8 0 g/dL  6 4-8 2   eGFR 72 ml/min/1 73sq m     [facility] Disease Education Program recommendations are as follows:  GFR calculation is accurate only with a steady state creatinine  Chronic Kidney disease less than 60 ml/min/1 73 sq  meters  Kidney failure less than 15 ml/min/1 73 sq  meters  * XR CHEST PA & LATERAL [id number] 08:49AM EPIC, Provider   Test ordered by: Angelo Griffin     Test Name Result Flag Reference   XR CHEST PA & LATERAL (Report)     CHEST      INDICATION: Pre-op right total hip arthroplasty     COMPARISON: January 30, 2015     VIEWS: Frontal and lateral projections     IMAGES: 2     FINDINGS:        Cardiomediastinal silhouette appears unremarkable  The lungs are clear  No pneumothorax or pleural effusion  Visualized osseous structures appear within normal limits for the patient's age  IMPRESSION:     No active pulmonary disease  Workstation performed: [id number]     Signed by:   Aamir Lucas MD   5/3/17     Assessment    1  Preop examination (V72 84) (Z01 818)   2  History of Primary localized osteoarthritis of left hip (715 15) (M16 12)   3  Primary localized osteoarthritis of left hip (715 15) (M16 12)   4   Benign essential hypertension (401 1) (I10)    Plan  Acid reflux    · Pantoprazole Sodium 40 MG Oral Tablet Delayed Release; TAKE 1 TABLET DAILY    Discussion/Summary  Surgical Clearance: He is at a LOW risk from a cardiovascular standpoint at this time without any additional cardiac testing  Reevaluation needed, if he should present with symptoms prior to surgery/procedure  Patient presented for preop clearance for left total hip replacement  His chronic problems are stable  His hypertension is well controlled on losartan  I reviewed his preoperative blood work including urinalysis CBC CMP coag panel  EKG and chest x-ray done 6 months ago also acceptable  He is low risk for this procedure and is medically cleared to proceed  End of Encounter Meds    1  Pantoprazole Sodium 40 MG Oral Tablet Delayed Release; TAKE 1 TABLET DAILY; Therapy: 46CDY7593 to (Evaluate:18Jun2018)  Requested for: 20Nov2017; Last   Rx:[id number]; Status: ACTIVE - Transmit to Pharmacy - Awaiting Verification Ordered    2  Losartan Potassium 100 MG Oral Tablet; TAKE 1 TABLET DAILY (NEED APPOINTMENT); Therapy: 89ZIG4416 to (Last TT:[id number])  Requested for: 24Oct2017 Ordered    3  Naproxen 500 MG Oral Tablet; TAKE 1 TABLET Every twelve hours PRN; Therapy: 98YFC4447 to Recorded    4  Cyclobenzaprine HCl - 10 MG Oral Tablet; TAKE 1 TABLET 3 TIMES DAILY AS NEEDED;    Therapy: 85Hqo2928 to (Evaluate:26Apr2017)  Requested for: 90Qcn3555; Last   Rx:[id number] Ordered    Signatures   Electronically signed by : Madina Santamaria DO; Nov 20 2017  6:42PM EST                       (Author)

## 2018-01-11 NOTE — PROGRESS NOTES
Assessment    1  Acid reflux (530 81) (K21 9)    Plan  Acid reflux    · Esomeprazole Magnesium 40 MG Oral Capsule Delayed Release; TAKE 1  CAPSULE ONCE DAILY   · Alejandra - Shane HERMAN, Rodriguez Krishnamurthy  (Gastroenterology) Physician Referral  EGD  Status: Active   Requested for: 16BRN0188  Care Summary provided  : Yes    Discussion/Summary    Patient has moderate to severe reflux  He is already on significant dosage of proton pump inhibitor  We will add Nexium in place of his current omeprazole but we will also have him see gastroenterology again  He had an EGD approximately 2 years ago which was normal but given the severity of symptoms at this time we will send him for reevaluation  Chief Complaint  Patient here for a f/u on his acid reflux  History of Present Illness  Patient was seen in follow-up for reflux  He has had intermittent problems with reflux over the years and in the past at times flareups have been severe  He was seen several weeks ago and placed on omeprazole but feels that it is not been effective  He relates in the past the only medication that has controlled his symptoms has been Nexium  He is also taking Vimovo for muscular skeletal symptoms which does contain he's omeprazole in addition to his 20 mg of omeprazole  Review of Systems    Gastrointestinal: as noted in HPI  Active Problems    1  Acute gastritis (535 00) (K29 00)   2  Benign essential hypertension (401 1) (I10)   3  Chronic low back pain (724 2,338 29) (M54 5,G89 29)   4  Degenerative tear of acetabular labrum of right hip (715 95) (M16 11)   5  Enlarged prostate (600 00) (N40 0)   6  Esophageal reflux (530 81) (K21 9)   7  Pain of right hip joint (719 45) (M25 551)   8  Primary localized osteoarthritis of left hip (715 15) (M16 12)   9  Primary localized osteoarthritis of right hip (715 15) (M16 11)   10  Vitamin D deficiency (268 9) (E55 9)    Past Medical History    1  History of Acute pain (338 19) (R52)   2   History of Bilateral hip pain (719 45) (M25 551,M25 552)   3  History of Dysphagia (787 20) (R13 10)   4  History of Encounter for screening colonoscopy (V76 51) (Z12 11)   5  History of Fatigue, unspecified type (780 79) (R53 83)   6  History of Foot pain, unspecified laterality   7  History of Groin pain (789 09) (R10 30)   8  History of abdominal pain (V13 89) (Z87 898)   9  History of constipation (V12 79) (Z87 19)   10  History of dermatitis (V13 3) (Z87 2)   11  History of esophageal reflux (V12 79) (Z87 19)   12  History of low back pain (V13 59) (Z87 39)   13  History of Lipid screening (V77 91) (Z13 220)   14  History of Neck pain (723 1) (M54 2)   15  History of Need for immunization against influenza (V04 81) (Z23)   16  History of Screening for prostate cancer (V76 44) (Z12 5)   17  History of Skin lesion (709 9) (L98 9)   18  History of Special screening examination for neoplasm of prostate (V76 44) (Z12 5)   19  History of Urinary frequency (788 41) (R35 0)    Family History  Mother    1  Family history of Hypertension  Father    2  Family history of Hypertension  Sister    3  Family history of Hypertension  Brother    4  Family history of Diabetes   5  Family history of Hypertension    Social History    · Former smoker (S27 42) (Z31 974)    Current Meds   1  Cyclobenzaprine HCl - 10 MG Oral Tablet; TAKE 1 TABLET 3 TIMES DAILY AS NEEDED; Therapy: 37Svo8304 to (Evaluate:26Apr2017)  Requested for: 76Pxl6384; Last   Rx:22Gml3397 Ordered   2  Losartan Potassium 100 MG Oral Tablet; TAKE 1 TABLET DAILY (NEED APPOINTMENT); Therapy: 36ZLO8727 to (Last Rx:28Nov2016)  Requested for: 28Nov2016 Ordered   3  Omeprazole 40 MG Oral Capsule Delayed Release; take 1 capsule daily; Therapy: 58HMZ4965 to (Evaluate:02Apr2017)  Requested for: 27NRK9962; Last   Rx:02Jan2017 Ordered   4  Vimovo 500-20 MG Oral Tablet Delayed Release; TAKE 1 TABLET po bid prn pain;    Therapy: 60WJJ9153 to (Last Rx:41Qlc2376)  Requested for: 86ATZ3908 Ordered    Allergies    1  No Known Drug Allergies    Vitals  Vital Signs    Recorded: 57NPM8230 06:59PM   Temperature 97 7 F   Heart Rate 86   Systolic 087   Diastolic 92   Height 5 ft 10 in   Weight 186 lb    BMI Calculated 26 69   BSA Calculated 2 03   O2 Saturation 97, RA     Physical Exam    Constitutional   General appearance: No acute distress, well appearing and well nourished  Pulmonary   Respiratory effort: No increased work of breathing or signs of respiratory distress  Auscultation of lungs: Clear to auscultation, equal breath sounds bilaterally, no wheezes, no rales, no rhonci  Cardiovascular   Auscultation of heart: Normal rate and rhythm, normal S1 and S2, without murmurs  Abdomen   Abdomen: Non-tender, no masses  Results/Data  PHQ-9 Adult Depression Screening 94CHP2083 07:00PM User, achvr     Test Name Result Flag Reference   PHQ-9 Adult Depression Score 1     Over the last two weeks, how often have you been bothered by any of the following problems? Little interest or pleasure in doing things: Not at all - 0  Feeling down, depressed, or hopeless: Not at all - 0  Trouble falling or staying asleep, or sleeping too much: Several days - 1  Feeling tired or having little energy: Not at all - 0  Poor appetite or over eating: Not at all - 0  Feeling bad about yourself - or that you are a failure or have let yourself or your family down: Not at all - 0  Trouble concentrating on things, such as reading the newspaper or watching television: Not at all - 0  Moving or speaking so slowly that other people could have noticed   Or the opposite -  being so fidgety or restless that you have been moving around a lot more than usual: Not at all - 0  Thoughts that you would be better off dead, or of hurting yourself in some way: Not at all - 0   PHQ-9 Adult Depression Screening Negative     PHQ-9 Difficulty Level Not difficult at all     PHQ-9 Severity Minimal Depression         Health Management  History of Encounter for screening colonoscopy   COLONOSCOPY; every 10 years; Last 18HQR3759; Next Due: 61MTG8503; Active    Future Appointments    Date/Time Provider Specialty Site   03/21/2017 04:40 PM SALLY Molina   Orthopedic Surgery [facility]     Signatures   Electronically signed by : Kelin Bunn DO; Mar  9 2017  7:47AM EST                       (Author)

## 2018-01-11 NOTE — RESULT NOTES
Verified Results  * XR HIP/PELV 2-3 VWS LEFT W PELVIS IF PERFORMED 14Oct2016 04:48PM [employer] Order Number: [id number]     Test Name Result Flag Reference   * XR HIP/PELV 2-3 VWS LEFT (Report)     LEFT HIP     INDICATION: Left hip pain  COMPARISON: None     VIEWS: AP pelvis and 2 coned down views; 3 images     FINDINGS:     There is no fracture or dislocation  There is severe osteoarthritis of the left hip with loss of superior joint space height  Subchondral sclerosis and marginal spurring noted     Soft tissues are unremarkable  IMPRESSION:     Severe osteoarthritis of the left hip without acute osseous abnormality       Workstation performed: [id number]     Signed by:   Ian Enriquez MD   10/15/16     * XR HIP/PELV 2-3 VWS RIGHT W PELVIS IF PERFORMED 14Oct2016 04:48PM [employer] Order Number: [id number]     Test Name Result Flag Reference   * XR HIP/PELV 2-3 VWS RIGHT (Report)     RIGHT HIP     INDICATION: Right hip pain  COMPARISON: None     VIEWS: 2 coned down views of the hip; 2 images     FINDINGS:     There is no acute fracture or dislocation  There is mild narrowing of the lateral aspect of the right hip joint  No lytic or blastic lesions are seen  Soft tissues are unremarkable  IMPRESSION:     There is mild osteoarthritis of the right hip with mild narrowing of the lateral superior joint space   There is no acute osseous abnormality       Workstation performed: [id number]     Signed by:   Ian Enriquez MD   10/15/16

## 2018-01-12 VITALS
HEIGHT: 70 IN | SYSTOLIC BLOOD PRESSURE: 152 MMHG | DIASTOLIC BLOOD PRESSURE: 92 MMHG | WEIGHT: 187.13 LBS | HEART RATE: 96 BPM | BODY MASS INDEX: 26.79 KG/M2

## 2018-01-12 NOTE — RESULT NOTES
Verified Results  (Q) CBC (INCLUDES DIFF/PLT) (REFL) 27UCL7341 07:10AM Indra Mckay     Test Name Result Flag Reference   WHITE BLOOD CELL COUNT 7 9 Thousand/uL  3 8-10 8   RED BLOOD CELL COUNT 4 86 Million/uL  4 20-5 80   HEMOGLOBIN 15 3 g/dL  13 2-17 1   HEMATOCRIT 46 8 %  38 5-50 0   MCV 96 5 fL  80 0-100 0   MCH 31 5 pg  27 0-33 0   MCHC 32 7 g/dL  32 0-36 0   RDW 13 1 %  11 0-15 0   PLATELET COUNT 708 Thousand/uL  140-400   MPV 7 7 fL  7 5-11 5   ABSOLUTE NEUTROPHILS 4693 cells/uL  9598-0047   ABSOLUTE LYMPHOCYTES 2410 cells/uL  850-3900   ABSOLUTE MONOCYTES 506 cells/uL  200-950   ABSOLUTE EOSINOPHILS 269 cells/uL     ABSOLUTE BASOPHILS 24 cells/uL  0-200   NEUTROPHILS 59 4 %     LYMPHOCYTES 30 5 %     MONOCYTES 6 4 %     EOSINOPHILS 3 4 %     BASOPHILS 0 3 %       (Q) COMPREHENSIVE METABOLIC PNL W/ADJUSTED CALCIUM 15Oct2016 07:10AM Olayinka Archer     Test Name Result Flag Reference   GLUCOSE 102 mg/dL H 65-99   Fasting reference interval   UREA NITROGEN (BUN) 18 mg/dL  7-25   CREATININE 0 99 mg/dL  0 70-1 33   For patients >52years of age, the reference limit  for Creatinine is approximately 13% higher for people  identified as -American  eGFR NON-AFR   AMERICAN 84 mL/min/1 73m2  > OR = 60   eGFR AFRICAN AMERICAN 97 mL/min/1 73m2  > OR = 60   BUN/CREATININE RATIO   8-99   NOT APPLICABLE (calc)   SODIUM 137 mmol/L  135-146   POTASSIUM 4 5 mmol/L  3 5-5 3   CHLORIDE 101 mmol/L     CARBON DIOXIDE 28 mmol/L  20-31   CALCIUM 9 9 mg/dL  8 6-10 3   CALCIUM (ADJUSTED FOR$ALBUMIN) 9 7 mg/dL (calc)  8 6-10 2   PROTEIN, TOTAL 7 5 g/dL  6 1-8 1   ALBUMIN 4 7 g/dL  3 6-5 1   GLOBULIN 2 8 g/dL (calc)  1 9-3 7   ALBUMIN/GLOBULIN RATIO 1 7 (calc)  1 0-2 5   BILIRUBIN, TOTAL 0 7 mg/dL  0 2-1 2   ALKALINE PHOSPHATASE 51 U/L     AST 24 U/L  10-35   ALT 30 U/L  9-46     (Q) LIPID PANEL WITH REFLEX TO DIRECT LDL 95OAQ8150 07:10AM Indar Mckay     Test Name Result Flag Reference   CHOLESTEROL, TOTAL 140 mg/dL  125-200   HDL CHOLESTEROL 48 mg/dL  > OR = 40   TRIGLICERIDES 49 mg/dL  <895   LDL-CHOLESTEROL 82 mg/dL (calc)  <130   Desirable range <100 mg/dL for patients with CHD or  diabetes and <70 mg/dL for diabetic patients with  known heart disease  CHOL/HDLC RATIO 2 9 (calc)  < OR = 5 0   NON HDL CHOLESTEROL 92 mg/dL (calc)     Target for non-HDL cholesterol is 30 mg/dL higher than   LDL cholesterol target  (Q) PSA, TOTAL WITH REFLEX TO PSA, FREE 15Oct2016 07:10AM Olayinka Archer     Test Name Result Flag Reference   TOTAL PSA 0 7 ng/mL  < OR = 4 0   This test was performed using the Jayna Monique  immunoassay method  Values obtained with other assay  methods cannot be used interchangeably  PSA levels,  regardless of value, should not be interpreted as  absolute evidence of the presence or absence of disease  (Q) TSH, 3RD GENERATION W/REFLEX TO FT4 15Oct2016 07:10AM Olayinka Archer   REPORT COMMENT:  FASTING:YES     Test Name Result Flag Reference   TSH W/REFLEX TO FT4 3 51 mIU/L  0 40-4 50     *(Q) VITAMIN D, 25-HYDROXY, LC/MS/MS 15Oct2016 07:10AM Olayinka Archer     Test Name Result Flag Reference   VITAMIN D, 25-OH, TOTAL 50 ng/mL     Vitamin D Status         25-OH Vitamin D:     Deficiency:                    <20 ng/mL  Insufficiency:             20 - 29 ng/mL  Optimal:                 > or = 30 ng/mL     For 25-OH Vitamin D testing on patients on   D2-supplementation and patients for whom quantitation   of D2 and D3 fractions is required, the QuestAssureD(TM)  25-OH VIT D, (D2,D3), LC/MS/MS is recommended: order   code 10755 (patients >2yrs)  For more information on this test, go to:  [email or web address]/YQF392  (This link is being provided for   informational/educational purposes only )

## 2018-01-13 VITALS
SYSTOLIC BLOOD PRESSURE: 144 MMHG | HEIGHT: 70 IN | BODY MASS INDEX: 26.68 KG/M2 | DIASTOLIC BLOOD PRESSURE: 90 MMHG | RESPIRATION RATE: 15 BRPM | HEART RATE: 94 BPM | OXYGEN SATURATION: 96 % | TEMPERATURE: 98.1 F | WEIGHT: 186.38 LBS

## 2018-01-13 VITALS
OXYGEN SATURATION: 97 % | WEIGHT: 186 LBS | HEIGHT: 70 IN | SYSTOLIC BLOOD PRESSURE: 150 MMHG | HEART RATE: 86 BPM | BODY MASS INDEX: 26.63 KG/M2 | TEMPERATURE: 97.7 F | DIASTOLIC BLOOD PRESSURE: 92 MMHG

## 2018-01-14 VITALS
HEART RATE: 89 BPM | BODY MASS INDEX: 26.69 KG/M2 | DIASTOLIC BLOOD PRESSURE: 95 MMHG | SYSTOLIC BLOOD PRESSURE: 145 MMHG | WEIGHT: 186 LBS

## 2018-01-14 VITALS
HEART RATE: 93 BPM | DIASTOLIC BLOOD PRESSURE: 92 MMHG | BODY MASS INDEX: 26.67 KG/M2 | TEMPERATURE: 97.4 F | HEIGHT: 70 IN | SYSTOLIC BLOOD PRESSURE: 134 MMHG | OXYGEN SATURATION: 98 % | WEIGHT: 186.31 LBS | RESPIRATION RATE: 15 BRPM

## 2018-01-14 VITALS
DIASTOLIC BLOOD PRESSURE: 100 MMHG | OXYGEN SATURATION: 96 % | WEIGHT: 182.5 LBS | TEMPERATURE: 97.7 F | SYSTOLIC BLOOD PRESSURE: 150 MMHG | RESPIRATION RATE: 18 BRPM | BODY MASS INDEX: 26.13 KG/M2 | HEIGHT: 70 IN | HEART RATE: 95 BPM

## 2018-01-14 VITALS
HEART RATE: 93 BPM | TEMPERATURE: 97 F | HEIGHT: 70 IN | BODY MASS INDEX: 26.5 KG/M2 | SYSTOLIC BLOOD PRESSURE: 142 MMHG | DIASTOLIC BLOOD PRESSURE: 96 MMHG | OXYGEN SATURATION: 97 % | WEIGHT: 185.13 LBS

## 2018-01-15 NOTE — RESULT NOTES
Verified Results  (1) CAROLANN SCREEN W/REFLEX TO TITER/PATTERN 40LIQ2333 07:07PM Sridevi Figures Order Number: [id number]     Test Name Result Flag Reference   CAROLANN SCREEN   Negative  Negative

## 2018-01-17 NOTE — RESULT NOTES
Verified Results  (1) LYME ANTIBODY PROFILE W/REFLEX TO WESTERN BLOT 54TBM8002 07:07PM [employer] Order Number: [id number]     Test Name Result Flag Reference   LYME IGG 0 30  0 00-0 79   NEGATIVE(0 00-0 79)-Absence of detectable Borrelia IgG Antibodies  A negative result does not exclude the possibility of Borrelia infection  If early Lyme disease is suspected,a second sample should be collected & tested 4 weeks after initial testing  LYME IGM 0 13  0 00-0 79   NEGATIVE (0 00-0 79)-Absence of detectable Borrelia IgM antibodies  A negative result does not exclude the possibility of Borrelia infection   If early lyme disease is suspected, a second sample should be collected & tested 4 weeks after initial testing      (1) RHEUMATOID FACTOR SCREEN 24Oct2017 07:07PM [employer] Order Number: [id number]     Test Name Result Flag Reference   RHEUMATOID FACTOR Negative  Negative     (1) C-REACTIVE PROTEIN 48EKL7619 07:07PM [employer] Order Number: [id number]     Test Name Result Flag Reference   C-REACT PROTEIN <3 0 mg/L  <3 0     (1) SED RATE [id number] 07:07PM [employer] Order Number: [id number]     Test Name Result Flag Reference   SED RATE 8 mm/hour  0-10

## 2018-04-22 DIAGNOSIS — I10 ESSENTIAL HYPERTENSION: Primary | ICD-10-CM

## 2018-04-22 RX ORDER — LOSARTAN POTASSIUM 100 MG/1
TABLET ORAL
Qty: 90 TABLET | Refills: 1 | Status: SHIPPED | OUTPATIENT
Start: 2018-04-22 | End: 2018-10-19 | Stop reason: SDUPTHER

## 2018-05-30 ENCOUNTER — TELEPHONE (OUTPATIENT)
Dept: FAMILY MEDICINE CLINIC | Facility: CLINIC | Age: 58
End: 2018-05-30

## 2018-05-30 NOTE — TELEPHONE ENCOUNTER
Pt called the office stating that he wanted to make an appointment to discuss acid reflux and both of his shoulders feel tight and he has had this for years he thinks it is arthritis  No reports chest pain no left arm pain or trouble breathing  I asked pt    I scheduled him for Dr Gil's next available evening  I offered to schedule him with another provider he did decline  I informed pt that if he would like  to see if Dr Gil had any cancellations he may give us a call and or if he would like to see a different provider so he may be seen sooner

## 2018-06-05 ENCOUNTER — OFFICE VISIT (OUTPATIENT)
Dept: FAMILY MEDICINE CLINIC | Facility: CLINIC | Age: 58
End: 2018-06-05
Payer: COMMERCIAL

## 2018-06-05 VITALS
SYSTOLIC BLOOD PRESSURE: 144 MMHG | TEMPERATURE: 98 F | HEIGHT: 70 IN | OXYGEN SATURATION: 98 % | DIASTOLIC BLOOD PRESSURE: 90 MMHG | RESPIRATION RATE: 15 BRPM | BODY MASS INDEX: 26.37 KG/M2 | WEIGHT: 184.2 LBS | HEART RATE: 83 BPM

## 2018-06-05 DIAGNOSIS — M25.511 CHRONIC PAIN OF BOTH SHOULDERS: ICD-10-CM

## 2018-06-05 DIAGNOSIS — M25.512 CHRONIC PAIN OF BOTH SHOULDERS: ICD-10-CM

## 2018-06-05 DIAGNOSIS — G89.29 CHRONIC PAIN OF BOTH SHOULDERS: ICD-10-CM

## 2018-06-05 DIAGNOSIS — K21.9 GASTROESOPHAGEAL REFLUX DISEASE, ESOPHAGITIS PRESENCE NOT SPECIFIED: Primary | ICD-10-CM

## 2018-06-05 PROBLEM — M25.50 DIFFUSE ARTHRALGIA: Status: ACTIVE | Noted: 2017-10-24

## 2018-06-05 PROCEDURE — 99214 OFFICE O/P EST MOD 30 MIN: CPT | Performed by: FAMILY MEDICINE

## 2018-06-05 RX ORDER — SUCRALFATE 1 G/10ML
SUSPENSION ORAL
COMMUNITY
Start: 2018-03-12 | End: 2018-06-05 | Stop reason: SDDI

## 2018-06-05 RX ORDER — PANTOPRAZOLE SODIUM 40 MG/1
40 TABLET, DELAYED RELEASE ORAL 2 TIMES DAILY
Qty: 60 TABLET | Refills: 2 | Status: SHIPPED | OUTPATIENT
Start: 2018-06-05 | End: 2019-03-14 | Stop reason: ALTCHOICE

## 2018-06-05 RX ORDER — NAPROXEN 500 MG/1
1 TABLET ORAL EVERY 12 HOURS PRN
COMMUNITY
Start: 2017-10-24 | End: 2019-01-31 | Stop reason: ALTCHOICE

## 2018-06-06 NOTE — PROGRESS NOTES
Assessment/Plan:   1  Gastroesophageal reflux disease, esophagitis presence not specified  Patient's symptoms appear likely secondary to worsening reflux/gastritis  He was educated on the pathophysiology is problem  At this time, it appears that he has been exposed to multiple triggers  He states that he does drink alcohol as well as take naproxen once a week for his shoulder pain  He had currently is on Protonix 40 mg daily  Will increase this to b i d  dosing  He was advised that he would benefit highly from seeing a gastroenterologist   Referral was given today  If any symptoms should worsen, he should follow up immediately  - Ambulatory referral to Gastroenterology; Future  - pantoprazole (PROTONIX) 40 mg tablet; Take 1 tablet (40 mg total) by mouth 2 (two) times a day for 30 days  Dispense: 60 tablet; Refill: 2    2  Chronic pain of both shoulders  Patient appears to have significant limitations in his range of motion  At this time, secondary to his physical exam findings, or for patient to Sports Medicine for further evaluation  He most likely would benefit from PT however due to the significant limitations in his range of motion, will have him evaluated 1st   - Ambulatory referral to Sports Medicine; Future     Diagnoses and all orders for this visit:    Gastroesophageal reflux disease, esophagitis presence not specified  -     Ambulatory referral to Gastroenterology; Future  -     pantoprazole (PROTONIX) 40 mg tablet; Take 1 tablet (40 mg total) by mouth 2 (two) times a day for 30 days    Chronic pain of both shoulders  -     Ambulatory referral to Sports Medicine; Future    Other orders  -     naproxen (NAPROSYN) 500 mg tablet; Take 1 tablet by mouth every 12 (twelve) hours as needed  -     Discontinue: CARAFATE 1 GM/10ML suspension;           Subjective:    Chief Complaint   Patient presents with    Heartburn    Shoulder Pain        Patient ID: Lizette Duarte is a 59 y o  male      Patient is a 47-year-old male presents today with a CC of worsening heartburn as well as bilateral shoulder pain  He states he has had heartburn increasing over the past few weeks  He denies any specific trigger  He states that he has a pain in his epigastric region has belching mainly in the morning time  He has been taking his Protonix regularly  He does admit to eating and drinking certain triggers  Patient also dot it has been getting worse  States that he has severe limitations dot he has been noticing clicking in his arms  The pain does radiate from his neck to his upper arm  He has been taking naproxen for symptom relief  Heartburn   He reports no abdominal pain, no chest pain, no coughing, no nausea or no sore throat  Pertinent negatives include no fatigue  Shoulder Pain    Pertinent negatives include no fever or numbness  Review of Systems   Constitutional: Negative for activity change, chills, fatigue and fever  HENT: Negative for congestion, ear pain, sinus pressure and sore throat  Eyes: Negative for redness, itching and visual disturbance  Respiratory: Negative for cough and shortness of breath  Cardiovascular: Negative for chest pain and palpitations  Gastrointestinal: Negative for abdominal pain, diarrhea and nausea  Endocrine: Negative for cold intolerance and heat intolerance  Genitourinary: Negative for dysuria, flank pain and frequency  Musculoskeletal: Negative for arthralgias, back pain, gait problem and myalgias  Skin: Negative for color change  Allergic/Immunologic: Negative for environmental allergies  Neurological: Negative for dizziness, numbness and headaches  Psychiatric/Behavioral: Negative for behavioral problems and sleep disturbance  The following portions of the patient's history were reviewed and updated as appropriate : past family history, past medical history, past social history and past surgical history      Objective:    Vitals: 06/05/18 1947   BP: 144/90   BP Location: Left arm   Patient Position: Sitting   Cuff Size: Adult   Pulse: 83   Resp: 15   Temp: 98 °F (36 7 °C)   TempSrc: Tympanic   SpO2: 98%   Weight: 83 6 kg (184 lb 3 2 oz)   Height: 5' 10" (1 778 m)        Physical Exam   Constitutional: He is oriented to person, place, and time  He appears well-developed and well-nourished  HENT:   Head: Normocephalic and atraumatic  Nose: Nose normal    Mouth/Throat: No oropharyngeal exudate  Eyes: Pupils are equal, round, and reactive to light  Right eye exhibits no discharge  Left eye exhibits no discharge  Neck: Normal range of motion  Neck supple  No tracheal deviation present  Cardiovascular: Normal rate, regular rhythm and intact distal pulses  Exam reveals no gallop and no friction rub  No murmur heard  Pulses:       Dorsalis pedis pulses are 2+ on the right side, and 2+ on the left side  Posterior tibial pulses are 2+ on the right side, and 2+ on the left side  Pulmonary/Chest: Effort normal and breath sounds normal  No respiratory distress  He has no wheezes  He has no rales  Abdominal: Soft  Bowel sounds are normal  He exhibits no distension  There is tenderness  There is no rebound and no guarding  Musculoskeletal: He exhibits no edema  Right shoulder: He exhibits decreased range of motion, tenderness and pain  Left shoulder: He exhibits decreased range of motion, tenderness and pain  Lymphadenopathy:        Head (right side): No submental and no submandibular adenopathy present  Head (left side): No submental and no submandibular adenopathy present  He has no cervical adenopathy  Right cervical: No superficial cervical, no deep cervical and no posterior cervical adenopathy present  Left cervical: No superficial cervical, no deep cervical and no posterior cervical adenopathy present  Neurological: He is alert and oriented to person, place, and time   No cranial nerve deficit or sensory deficit  Skin: Skin is warm, dry and intact  Psychiatric: His speech is normal and behavior is normal  Judgment normal  His mood appears not anxious  Cognition and memory are normal  He does not exhibit a depressed mood  Vitals reviewed

## 2018-10-19 DIAGNOSIS — I10 ESSENTIAL HYPERTENSION: ICD-10-CM

## 2018-10-19 RX ORDER — LOSARTAN POTASSIUM 100 MG/1
TABLET ORAL
Qty: 90 TABLET | Refills: 1 | Status: SHIPPED | OUTPATIENT
Start: 2018-10-19 | End: 2019-04-17 | Stop reason: SDUPTHER

## 2018-10-30 ENCOUNTER — OFFICE VISIT (OUTPATIENT)
Dept: FAMILY MEDICINE CLINIC | Facility: CLINIC | Age: 58
End: 2018-10-30
Payer: COMMERCIAL

## 2018-10-30 VITALS
DIASTOLIC BLOOD PRESSURE: 94 MMHG | TEMPERATURE: 97.8 F | RESPIRATION RATE: 16 BRPM | WEIGHT: 185.3 LBS | BODY MASS INDEX: 26.53 KG/M2 | OXYGEN SATURATION: 98 % | SYSTOLIC BLOOD PRESSURE: 138 MMHG | HEART RATE: 75 BPM | HEIGHT: 70 IN

## 2018-10-30 DIAGNOSIS — S05.01XA ABRASION OF RIGHT CORNEA, INITIAL ENCOUNTER: Primary | ICD-10-CM

## 2018-10-30 PROCEDURE — 99214 OFFICE O/P EST MOD 30 MIN: CPT | Performed by: NURSE PRACTITIONER

## 2018-10-30 PROCEDURE — 3008F BODY MASS INDEX DOCD: CPT | Performed by: NURSE PRACTITIONER

## 2018-10-30 PROCEDURE — 1036F TOBACCO NON-USER: CPT | Performed by: NURSE PRACTITIONER

## 2018-10-30 RX ORDER — GENTAMICIN SULFATE 3 MG/ML
1 SOLUTION/ DROPS OPHTHALMIC EVERY 4 HOURS
Qty: 5 ML | Refills: 0 | Status: SHIPPED | OUTPATIENT
Start: 2018-10-30 | End: 2018-11-04

## 2018-10-31 NOTE — PATIENT INSTRUCTIONS
Corneal Abrasion   WHAT YOU NEED TO KNOW:   A corneal abrasion is a scratch on the cornea of your eye  The cornea is the clear layer that covers the front of your eye  A small scratch may heal in 1 to 2 days  Deeper or larger scratches may take longer to heal         DISCHARGE INSTRUCTIONS:   Contact your healthcare provider if:   · Your eye pain or vision gets worse  · You have yellow or green drainage from your eye  · You have questions or concerns about your condition or care  Medicines:   · Medicines  may be given in the form of eyedrops or ointment to help prevent an eye infection  You may also be given eye drops to decrease pain  Ask how to take this medicine safely  · Take your medicine as directed  Contact your healthcare provider if you think your medicine is not helping or if you have side effects  Tell him or her if you are allergic to any medicine  Keep a list of the medicines, vitamins, and herbs you take  Include the amounts, and when and why you take them  Bring the list or the pill bottles to follow-up visits  Carry your medicine list with you in case of an emergency  Follow up with your healthcare provider as directed:  Write down your questions so you remember to ask them during your visits  Self-care:   · Do not touch or rub your eye  · Ask your healthcare provider when you can start your normal activities  · Ask your healthcare provider when you can wear your contact lenses  · Wear sunglasses in bright light until your eyes feel better  Help prevent corneal abrasions:   · Remove your contact lenses if your eyes feel dry or irritated  · Wash your hands if you need to touch your eyes or your face  · Trim your child's fingernails so he cannot scratch his eye  · Wear protective eyewear when you work with chemicals, wood, dust, or metal      · Wear protective eyewear when you play sports  · Do not wear your contacts for longer than you should       · Do not wear colored lenses or lenses with shapes on them  These lenses may cause eye damage and vision loss  · Do not wear glitter makeup  Glitter can easily get into your eyes and under contact lenses  · Do not sleep with your contacts in your eyes  © 2017 [address] [employer] Information is for End User's use only and may not be sold, redistributed or otherwise used for commercial purposes  All illustrations and images included in CareNotes® are the copyrighted property of [employer]  or Luis Valladares  The above information is an  only  It is not intended as medical advice for individual conditions or treatments  Talk to your doctor, nurse or pharmacist before following any medical regimen to see if it is safe and effective for you

## 2018-11-07 ENCOUNTER — TELEPHONE (OUTPATIENT)
Dept: FAMILY MEDICINE CLINIC | Facility: CLINIC | Age: 58
End: 2018-11-07

## 2018-11-07 DIAGNOSIS — H57.11 EYE PAIN, RIGHT: Primary | ICD-10-CM

## 2018-11-07 NOTE — TELEPHONE ENCOUNTER
Pt called into the office and stated that he was in on 10/30 to see you for his R eye  Since then he has had no relief, and would like to see a specialist  Please advise  Thank you!

## 2019-01-31 ENCOUNTER — OFFICE VISIT (OUTPATIENT)
Dept: FAMILY MEDICINE CLINIC | Facility: CLINIC | Age: 59
End: 2019-01-31
Payer: COMMERCIAL

## 2019-01-31 VITALS
OXYGEN SATURATION: 97 % | HEART RATE: 85 BPM | WEIGHT: 184.2 LBS | BODY MASS INDEX: 27.28 KG/M2 | HEIGHT: 69 IN | DIASTOLIC BLOOD PRESSURE: 88 MMHG | TEMPERATURE: 97.3 F | SYSTOLIC BLOOD PRESSURE: 126 MMHG

## 2019-01-31 DIAGNOSIS — F41.9 ANXIETY: Primary | ICD-10-CM

## 2019-01-31 PROCEDURE — 99213 OFFICE O/P EST LOW 20 MIN: CPT | Performed by: NURSE PRACTITIONER

## 2019-01-31 RX ORDER — SERTRALINE HYDROCHLORIDE 25 MG/1
25 TABLET, FILM COATED ORAL DAILY
Qty: 30 TABLET | Refills: 2 | Status: SHIPPED | OUTPATIENT
Start: 2019-01-31 | End: 2019-03-14 | Stop reason: ALTCHOICE

## 2019-02-01 NOTE — PROGRESS NOTES
[facility]    ASSESSMENT AND PLAN     1  Anxiety  13-year-old male presents today to discuss anxiety he has been feeling over the last several months  He states it has been worsening over the last few weeks  He is having difficulty concentrating and shutting his brain down at night  He is not sleeping well which seems to be increasing his anxiety during the day  He does note his anxiety is worse during the week when he is at work  He is hoping to retire within the next 2 years  We discussed both pharmacological and nonpharmacological options of managing anxiety  Rx given today to start Zoloft at 25 mg per day  He is to return to the office in 4-6 weeks for evaluation of symptoms  We did discuss that it will take several weeks for the medication to become therapeutic  He will consider scheduling with either social work or therapist for additional assistance  We also reviewed relaxation techniques  He is to be re-evaluated should his symptoms change, persist and/or worsen prior to being seen in 4-6 weeks  - sertraline (ZOLOFT) 25 mg tablet; Take 1 tablet (25 mg total) by mouth daily  Dispense: 30 tablet; Refill: 2            SUBJECTIVE       Patient ID: Vj De La O is a 59 y o  male  Chief Complaint   Patient presents with    Cough     x 1 month       HISTORY OF PRESENT ILLNESS    Patient presents today with complaints of increasing anxiety over the last few weeks  He states he felt his anxiety starting several months ago, but it has been progressively getting worse  He worries about "everything"  He has a difficult time shutting his brain down at night and sleeping  Which makes him tired during the day and feels that that increases his anxiety is well  He notices most while he is at work  He states his job is stressful and his coworkers can be stressful  He is hoping to retire within the next 2 years            The following portions of the patient's history were reviewed and updated as appropriate: allergies, current medications, past medical history and problem list     REVIEW OF SYSTEMS  Review of Systems   Constitutional: Positive for fatigue  Negative for activity change and appetite change  HENT: Negative  Respiratory: Positive for cough (He does complain of an ongoing, intermittent cough  States he had of bad cold last month and the cough still lingers now and then  )  Negative for chest tightness, shortness of breath and wheezing  Cardiovascular: Negative  Musculoskeletal: Negative  Neurological: Negative  Psychiatric/Behavioral: Positive for decreased concentration and sleep disturbance  Negative for agitation, confusion, dysphoric mood, self-injury and suicidal ideas  The patient is nervous/anxious ( increasing anxiety and worry)  The patient is not hyperactive  OBJECTIVE      VITAL SIGNS  /88 (BP Location: Left arm, Patient Position: Sitting)   Pulse 85   Temp (!) 97 3 °F (36 3 °C) (Tympanic)   Ht 5' 8 5" (1 74 m)   Wt 83 6 kg (184 lb 3 2 oz)   SpO2 97%   BMI 27 60 kg/m²       PHYSICAL EXAMINATION   Physical Exam   Constitutional: He is oriented to person, place, and time  Vital signs are normal  He appears well-developed and well-nourished  HENT:   Head: Normocephalic  Right Ear: Hearing, tympanic membrane, external ear and ear canal normal  No middle ear effusion  Left Ear: Hearing, tympanic membrane, external ear and ear canal normal   No middle ear effusion  Nose: No mucosal edema  Mouth/Throat: Uvula is midline, oropharynx is clear and moist and mucous membranes are normal    Eyes: Conjunctivae are normal  Right eye exhibits no discharge  Left eye exhibits no discharge  Neck: Carotid bruit is not present  Cardiovascular: Normal rate, regular rhythm and normal heart sounds  Pulmonary/Chest: Effort normal and breath sounds normal  No respiratory distress  He has no decreased breath sounds  He has no wheezes   He has no rhonchi  He has no rales  Musculoskeletal: Normal range of motion  Lymphadenopathy:        Head (right side): No submental, no submandibular, no tonsillar, no preauricular, no posterior auricular and no occipital adenopathy present  Head (left side): No submental, no submandibular, no tonsillar, no preauricular, no posterior auricular and no occipital adenopathy present  He has no cervical adenopathy  Neurological: He is alert and oriented to person, place, and time  Skin: Skin is warm, dry and intact  Psychiatric: He has a normal mood and affect  His speech is normal and behavior is normal  Judgment and thought content normal  Cognition and memory are normal    Nursing note and vitals reviewed

## 2019-02-02 PROBLEM — R09.89 GLOBUS PHARYNGEUS: Status: ACTIVE | Noted: 2018-12-20

## 2019-02-02 PROBLEM — K21.9 LPRD (LARYNGOPHARYNGEAL REFLUX DISEASE): Status: ACTIVE | Noted: 2018-12-20

## 2019-02-02 PROBLEM — J38.2 VOCAL NODULES IN ADULTS: Status: ACTIVE | Noted: 2018-12-20

## 2019-03-14 ENCOUNTER — OFFICE VISIT (OUTPATIENT)
Dept: FAMILY MEDICINE CLINIC | Facility: CLINIC | Age: 59
End: 2019-03-14
Payer: COMMERCIAL

## 2019-03-14 VITALS
DIASTOLIC BLOOD PRESSURE: 90 MMHG | BODY MASS INDEX: 27.24 KG/M2 | TEMPERATURE: 98 F | HEIGHT: 69 IN | WEIGHT: 183.9 LBS | SYSTOLIC BLOOD PRESSURE: 148 MMHG | HEART RATE: 78 BPM | RESPIRATION RATE: 17 BRPM

## 2019-03-14 DIAGNOSIS — Z13.1 SCREENING FOR DIABETES MELLITUS: ICD-10-CM

## 2019-03-14 DIAGNOSIS — F41.9 ANXIETY: ICD-10-CM

## 2019-03-14 DIAGNOSIS — Z13.220 SCREENING FOR LIPID DISORDERS: ICD-10-CM

## 2019-03-14 DIAGNOSIS — Z13.6 SCREENING FOR CARDIOVASCULAR CONDITION: ICD-10-CM

## 2019-03-14 DIAGNOSIS — Z13.0 SCREENING FOR DEFICIENCY ANEMIA: ICD-10-CM

## 2019-03-14 DIAGNOSIS — R05.3 CHRONIC COUGH: Primary | ICD-10-CM

## 2019-03-14 DIAGNOSIS — Z12.5 SCREENING FOR PROSTATE CANCER: ICD-10-CM

## 2019-03-14 DIAGNOSIS — Z13.29 SCREENING FOR THYROID DISORDER: ICD-10-CM

## 2019-03-14 PROCEDURE — 99214 OFFICE O/P EST MOD 30 MIN: CPT | Performed by: NURSE PRACTITIONER

## 2019-03-14 RX ORDER — LORATADINE 10 MG/1
10 TABLET ORAL DAILY
Qty: 30 TABLET | Refills: 2 | Status: SHIPPED | OUTPATIENT
Start: 2019-03-14

## 2019-03-14 RX ORDER — ESOMEPRAZOLE MAGNESIUM 40 MG/1
40 CAPSULE, DELAYED RELEASE ORAL
COMMUNITY
End: 2020-01-21 | Stop reason: SDUPTHER

## 2019-03-14 NOTE — PROGRESS NOTES
[facility]    ASSESSMENT AND PLAN     1  Chronic cough  23-year-old male presents today for 6 week med recheck  Discussed below under anxiety  He complains also today of an ongoing, chronic cough  States it still feels as if he has something stuck" in his throat  He was seen and evaluated by ENT for same in December/2018  All testing was negative  Will obtain a chest x-ray today just to rule out any lung pathology  Recommend daily antihistamine, as that may assist with symptom control  He is to trial this for several weeks, and if it does not resolve his symptoms, he should return for further evaluation  - XR chest pa & lateral; Future  - loratadine (CLARITIN) 10 mg tablet; Take 1 tablet (10 mg total) by mouth daily  Dispense: 30 tablet; Refill: 2    2  Anxiety  States he seems to be managing his anxiety better, and would like to get off of the Zoloft that was started 6 weeks ago  He does not feel the Zoloft is helping  We discussed that he is currently on the lowest dose, and perhaps he may feel more effectiveness from a slightly higher dose  He would prefer to not do that  He would like to stop taking it  He will wean by take 1 tablet every other day for the next week and then discontinue completely  We reviewed again, nonpharmacological ways of managing his anxiety  Discussed counseling  He will consider  His anxiety seems to be primarily when he is at work  We discussed, as before, that it may be secondary to not sleeping well at night  He states he does not feel anxious once he is home  Discussed ways/options to assist with sleep  Will continue to monitor closely    3  Screening for cardiovascular condition  Patient has not had complete physical and/or routine lab work in over 1 year  Discussed with him today  Rx given today for fasting lab work  He is to obtain and schedule annual wellness exam   - CBC and differential; Future    4   Screening for thyroid disorder    - TSH, 3rd generation with Free T4 reflex; Future    5  Screening for prostate cancer    - PSA, total and free; Future    6  Screening for diabetes mellitus    - Comprehensive metabolic panel; Future  - HEMOGLOBIN A1C W/ EAG ESTIMATION; Future    7  Screening for deficiency anemia    - CBC and differential; Future    8  Screening for lipid disorders    - Lipid panel; Future            SUBJECTIVE       Patient ID: Judy Cuadra is a 59 y o  male  Chief Complaint   Patient presents with    Follow-up     Pt presents for a 6 week f/u due to anxiety  Pt states he feels no change in his anxiety  HISTORY OF PRESENT ILLNESS    Presents today for follow-up from starting Zoloft for anxiety 6 weeks ago  States he seems to be managing his anxiety better, and would like to get off of the Zoloft  He does not feel the Zoloft is helping  He would like to stop taking it  He complains also today of an ongoing, chronic cough  States it still feels as if he has something "stuck" in his throat  He has had this feeling now for several months  The following portions of the patient's history were reviewed and updated as appropriate: allergies, current medications, past medical history, past social history, past surgical history and problem list     REVIEW OF SYSTEMS  Review of Systems   HENT: Positive for sore throat (Feels like something is stuck in it)  Respiratory: Positive for cough  Negative for chest tightness, shortness of breath and wheezing  Choking:  ongoing, chronic  Dry and nonproductive  Cardiovascular: Negative  Psychiatric/Behavioral: Positive for sleep disturbance  Negative for confusion, decreased concentration, self-injury and suicidal ideas  The patient is nervous/anxious ( anxiety)  The patient is not hyperactive          OBJECTIVE      VITAL SIGNS  /90 (BP Location: Left arm, Patient Position: Sitting, Cuff Size: Adult)   Pulse 78   Temp 98 °F (36 7 °C) (Tympanic)   Resp 17   Ht 5' 8 5" (1 74 m)   Wt 83 4 kg (183 lb 14 4 oz)   BMI 27 56 kg/m²     CURRENT MEDICATIONS    Current Outpatient Medications:     cyclobenzaprine (FLEXERIL) 10 mg tablet, Take 10 mg by mouth 3 (three) times a day as needed for muscle spasms, Disp: , Rfl:     esomeprazole (NexIUM) 40 MG capsule, Take 40 mg by mouth every morning before breakfast, Disp: , Rfl:     losartan (COZAAR) 100 MG tablet, TAKE 1 TABLET DAILY (NEED APPOINTMENT), Disp: 90 tablet, Rfl: 1    Multiple Vitamin (MULTIVITAMIN) tablet, Take 1 tablet by mouth daily, Disp: , Rfl:     loratadine (CLARITIN) 10 mg tablet, Take 1 tablet (10 mg total) by mouth daily, Disp: 30 tablet, Rfl: 2      PHYSICAL EXAMINATION   Physical Exam   Constitutional: He is oriented to person, place, and time  He appears well-developed and well-nourished  HENT:   Nose: Nose normal    Mouth/Throat: Uvula is midline, oropharynx is clear and moist and mucous membranes are normal    Cardiovascular: Normal rate and regular rhythm  Pulmonary/Chest: Effort normal and breath sounds normal    Lymphadenopathy:        Head (right side): No submental, no submandibular, no tonsillar, no preauricular, no posterior auricular and no occipital adenopathy present  Head (left side): No submental, no submandibular, no tonsillar, no preauricular, no posterior auricular and no occipital adenopathy present  Neurological: He is alert and oriented to person, place, and time  Skin: Skin is warm, dry and intact  Psychiatric: He has a normal mood and affect  His speech is normal and behavior is normal  Judgment and thought content normal  Cognition and memory are normal    Nursing note and vitals reviewed

## 2019-03-20 ENCOUNTER — APPOINTMENT (OUTPATIENT)
Dept: RADIOLOGY | Facility: MEDICAL CENTER | Age: 59
End: 2019-03-20
Payer: COMMERCIAL

## 2019-03-20 ENCOUNTER — APPOINTMENT (OUTPATIENT)
Dept: LAB | Facility: MEDICAL CENTER | Age: 59
End: 2019-03-20
Payer: COMMERCIAL

## 2019-03-20 DIAGNOSIS — Z13.0 SCREENING FOR DEFICIENCY ANEMIA: ICD-10-CM

## 2019-03-20 DIAGNOSIS — Z13.1 SCREENING FOR DIABETES MELLITUS: ICD-10-CM

## 2019-03-20 DIAGNOSIS — Z12.5 SCREENING FOR PROSTATE CANCER: ICD-10-CM

## 2019-03-20 DIAGNOSIS — R05.3 CHRONIC COUGH: ICD-10-CM

## 2019-03-20 DIAGNOSIS — Z13.220 SCREENING FOR LIPID DISORDERS: ICD-10-CM

## 2019-03-20 DIAGNOSIS — Z13.6 SCREENING FOR CARDIOVASCULAR CONDITION: ICD-10-CM

## 2019-03-20 DIAGNOSIS — Z13.29 SCREENING FOR THYROID DISORDER: ICD-10-CM

## 2019-03-20 LAB
ALBUMIN SERPL BCP-MCNC: 3.9 G/DL (ref 3.5–5)
ALP SERPL-CCNC: 57 U/L (ref 46–116)
ALT SERPL W P-5'-P-CCNC: 43 U/L (ref 12–78)
ANION GAP SERPL CALCULATED.3IONS-SCNC: 5 MMOL/L (ref 4–13)
AST SERPL W P-5'-P-CCNC: 30 U/L (ref 5–45)
BASOPHILS # BLD AUTO: 0.06 THOUSANDS/ΜL (ref 0–0.1)
BASOPHILS NFR BLD AUTO: 1 % (ref 0–1)
BILIRUB SERPL-MCNC: 0.49 MG/DL (ref 0.2–1)
BUN SERPL-MCNC: 19 MG/DL (ref 5–25)
CALCIUM SERPL-MCNC: 8.9 MG/DL (ref 8.3–10.1)
CHLORIDE SERPL-SCNC: 102 MMOL/L (ref 100–108)
CHOLEST SERPL-MCNC: 122 MG/DL (ref 50–200)
CO2 SERPL-SCNC: 29 MMOL/L (ref 21–32)
CREAT SERPL-MCNC: 0.99 MG/DL (ref 0.6–1.3)
EOSINOPHIL # BLD AUTO: 0.25 THOUSAND/ΜL (ref 0–0.61)
EOSINOPHIL NFR BLD AUTO: 4 % (ref 0–6)
ERYTHROCYTE [DISTWIDTH] IN BLOOD BY AUTOMATED COUNT: 12.3 % (ref 11.6–15.1)
EST. AVERAGE GLUCOSE BLD GHB EST-MCNC: 123 MG/DL
GFR SERPL CREATININE-BSD FRML MDRD: 82 ML/MIN/1.73SQ M
GLUCOSE P FAST SERPL-MCNC: 94 MG/DL (ref 65–99)
HBA1C MFR BLD: 5.9 % (ref 4.2–6.3)
HCT VFR BLD AUTO: 46.4 % (ref 36.5–49.3)
HDLC SERPL-MCNC: 42 MG/DL (ref 40–60)
HGB BLD-MCNC: 15.1 G/DL (ref 12–17)
IMM GRANULOCYTES # BLD AUTO: 0.01 THOUSAND/UL (ref 0–0.2)
IMM GRANULOCYTES NFR BLD AUTO: 0 % (ref 0–2)
LDLC SERPL CALC-MCNC: 65 MG/DL (ref 0–100)
LYMPHOCYTES # BLD AUTO: 2.12 THOUSANDS/ΜL (ref 0.6–4.47)
LYMPHOCYTES NFR BLD AUTO: 33 % (ref 14–44)
MCH RBC QN AUTO: 31.4 PG (ref 26.8–34.3)
MCHC RBC AUTO-ENTMCNC: 32.5 G/DL (ref 31.4–37.4)
MCV RBC AUTO: 97 FL (ref 82–98)
MONOCYTES # BLD AUTO: 0.59 THOUSAND/ΜL (ref 0.17–1.22)
MONOCYTES NFR BLD AUTO: 9 % (ref 4–12)
NEUTROPHILS # BLD AUTO: 3.48 THOUSANDS/ΜL (ref 1.85–7.62)
NEUTS SEG NFR BLD AUTO: 53 % (ref 43–75)
NONHDLC SERPL-MCNC: 80 MG/DL
NRBC BLD AUTO-RTO: 0 /100 WBCS
PLATELET # BLD AUTO: 364 THOUSANDS/UL (ref 149–390)
PMV BLD AUTO: 9.2 FL (ref 8.9–12.7)
POTASSIUM SERPL-SCNC: 4.6 MMOL/L (ref 3.5–5.3)
PROT SERPL-MCNC: 7.8 G/DL (ref 6.4–8.2)
RBC # BLD AUTO: 4.81 MILLION/UL (ref 3.88–5.62)
SODIUM SERPL-SCNC: 136 MMOL/L (ref 136–145)
T4 FREE SERPL-MCNC: 0.97 NG/DL (ref 0.76–1.46)
TRIGL SERPL-MCNC: 73 MG/DL
TSH SERPL DL<=0.05 MIU/L-ACNC: 3.94 UIU/ML (ref 0.36–3.74)
WBC # BLD AUTO: 6.51 THOUSAND/UL (ref 4.31–10.16)

## 2019-03-20 PROCEDURE — 84439 ASSAY OF FREE THYROXINE: CPT

## 2019-03-20 PROCEDURE — 71046 X-RAY EXAM CHEST 2 VIEWS: CPT

## 2019-03-20 PROCEDURE — 83036 HEMOGLOBIN GLYCOSYLATED A1C: CPT

## 2019-03-20 PROCEDURE — 80061 LIPID PANEL: CPT

## 2019-03-20 PROCEDURE — 36415 COLL VENOUS BLD VENIPUNCTURE: CPT

## 2019-03-20 PROCEDURE — 84154 ASSAY OF PSA FREE: CPT

## 2019-03-20 PROCEDURE — 80053 COMPREHEN METABOLIC PANEL: CPT

## 2019-03-20 PROCEDURE — 84153 ASSAY OF PSA TOTAL: CPT

## 2019-03-20 PROCEDURE — 85025 COMPLETE CBC W/AUTO DIFF WBC: CPT

## 2019-03-20 PROCEDURE — 84443 ASSAY THYROID STIM HORMONE: CPT

## 2019-03-21 LAB
PSA FREE MFR SERPL: 30 %
PSA FREE SERPL-MCNC: 0.24 NG/ML
PSA SERPL-MCNC: 0.8 NG/ML (ref 0–4)

## 2019-04-17 DIAGNOSIS — I10 ESSENTIAL HYPERTENSION: ICD-10-CM

## 2019-04-17 RX ORDER — LOSARTAN POTASSIUM 100 MG/1
TABLET ORAL
Qty: 90 TABLET | Refills: 1 | Status: SHIPPED | OUTPATIENT
Start: 2019-04-17 | End: 2019-10-14 | Stop reason: SDUPTHER

## 2019-10-14 DIAGNOSIS — I10 ESSENTIAL HYPERTENSION: ICD-10-CM

## 2019-10-14 RX ORDER — LOSARTAN POTASSIUM 100 MG/1
TABLET ORAL
Qty: 90 TABLET | Refills: 4 | Status: SHIPPED | OUTPATIENT
Start: 2019-10-14 | End: 2021-01-06

## 2019-12-12 DIAGNOSIS — M25.512 CHRONIC PAIN OF BOTH SHOULDERS: Primary | ICD-10-CM

## 2019-12-12 DIAGNOSIS — G89.29 CHRONIC PAIN OF BOTH SHOULDERS: Primary | ICD-10-CM

## 2019-12-12 DIAGNOSIS — M25.511 CHRONIC PAIN OF BOTH SHOULDERS: Primary | ICD-10-CM

## 2019-12-12 RX ORDER — CYCLOBENZAPRINE HCL 10 MG
10 TABLET ORAL 3 TIMES DAILY PRN
Qty: 30 TABLET | Refills: 0 | Status: SHIPPED | OUTPATIENT
Start: 2019-12-12 | End: 2020-05-22 | Stop reason: SDUPTHER

## 2020-01-21 ENCOUNTER — OFFICE VISIT (OUTPATIENT)
Dept: FAMILY MEDICINE CLINIC | Facility: CLINIC | Age: 60
End: 2020-01-21
Payer: COMMERCIAL

## 2020-01-21 VITALS
WEIGHT: 186.6 LBS | SYSTOLIC BLOOD PRESSURE: 152 MMHG | BODY MASS INDEX: 27.64 KG/M2 | HEART RATE: 94 BPM | DIASTOLIC BLOOD PRESSURE: 90 MMHG | TEMPERATURE: 97.6 F | OXYGEN SATURATION: 98 % | HEIGHT: 69 IN

## 2020-01-21 DIAGNOSIS — Z13.1 SCREENING FOR DIABETES MELLITUS: ICD-10-CM

## 2020-01-21 DIAGNOSIS — Z91.09 ENVIRONMENTAL ALLERGIES: ICD-10-CM

## 2020-01-21 DIAGNOSIS — M25.50 ARTHRALGIA, UNSPECIFIED JOINT: Primary | ICD-10-CM

## 2020-01-21 DIAGNOSIS — Z13.29 SCREENING FOR THYROID DISORDER: ICD-10-CM

## 2020-01-21 DIAGNOSIS — K21.9 GASTROESOPHAGEAL REFLUX DISEASE, ESOPHAGITIS PRESENCE NOT SPECIFIED: ICD-10-CM

## 2020-01-21 DIAGNOSIS — N40.0 ENLARGED PROSTATE: ICD-10-CM

## 2020-01-21 DIAGNOSIS — I10 BENIGN ESSENTIAL HYPERTENSION: ICD-10-CM

## 2020-01-21 DIAGNOSIS — Z13.220 SCREENING FOR LIPID DISORDERS: ICD-10-CM

## 2020-01-21 DIAGNOSIS — M54.2 NECK PAIN: ICD-10-CM

## 2020-01-21 PROCEDURE — 99214 OFFICE O/P EST MOD 30 MIN: CPT | Performed by: NURSE PRACTITIONER

## 2020-01-21 PROCEDURE — 3008F BODY MASS INDEX DOCD: CPT | Performed by: NURSE PRACTITIONER

## 2020-01-21 RX ORDER — ESOMEPRAZOLE MAGNESIUM 40 MG/1
40 CAPSULE, DELAYED RELEASE ORAL DAILY
Qty: 30 CAPSULE | Refills: 1 | Status: SHIPPED | OUTPATIENT
Start: 2020-01-21 | End: 2020-03-30 | Stop reason: SDUPTHER

## 2020-01-21 RX ORDER — MELOXICAM 15 MG/1
15 TABLET ORAL DAILY
Qty: 30 TABLET | Refills: 1 | Status: SHIPPED | OUTPATIENT
Start: 2020-01-21 | End: 2021-01-13 | Stop reason: SDUPTHER

## 2020-01-21 RX ORDER — FLUTICASONE PROPIONATE 50 MCG
1 SPRAY, SUSPENSION (ML) NASAL DAILY
Qty: 1 BOTTLE | Refills: 2 | Status: SHIPPED | OUTPATIENT
Start: 2020-01-21

## 2020-01-21 RX ORDER — HYDROCHLOROTHIAZIDE 12.5 MG/1
12.5 TABLET ORAL DAILY
Qty: 90 TABLET | Refills: 0 | Status: SHIPPED | OUTPATIENT
Start: 2020-01-21 | End: 2020-02-04 | Stop reason: DRUGHIGH

## 2020-01-23 ENCOUNTER — TELEPHONE (OUTPATIENT)
Dept: FAMILY MEDICINE CLINIC | Facility: CLINIC | Age: 60
End: 2020-01-23

## 2020-01-23 NOTE — TELEPHONE ENCOUNTER
Voicemail from patient asking if he should take HCTZ at same time as Losartan  I called back and left message informing him that he should take them together  Call back if any questions

## 2020-01-24 NOTE — PROGRESS NOTES
[facility]    ASSESSMENT AND PLAN     1  Arthralgia, unspecified joint  Patient presents today with complaint of multiple joint pains, present for the last several months  Physical and neuro assessment unremarkable as below  Exact etiology unclear today, but discussed likelihood of arthritis  Due for yearly lab work  Orders placed today  Recommend trialing meloxicam for the next 2-3 weeks, and assessing for improvement  Avoid other NSAIDs while taking  Ice and heat on joints can be beneficial as well  Return in 2-3 weeks for follow-up, after lab work obtained  Sooner if needed    - meloxicam (MOBIC) 15 mg tablet; Take 1 tablet (15 mg total) by mouth daily  Dispense: 30 tablet; Refill: 1  - RF Screen w/ Reflex to Titer; Future    2  Gastroesophageal reflux disease, esophagitis presence not specified  Patient states well controlled on Nexium 40  Will continue without change  Refilled today  - esomeprazole (NexIUM) 40 MG capsule; Take 1 capsule (40 mg total) by mouth daily  Dispense: 30 capsule; Refill: 1    3  Environmental allergies  Rx renewed  - fluticasone (FLONASE) 50 mcg/act nasal spray; 1 spray into each nostril daily  Dispense: 1 Bottle; Refill: 2    4  Neck pain  Complains today of neck pain as well  States it radiates down into shoulders  Will assess with cervical spine x-ray  Differential considered:  Cervical lordosis  Discussed PT may be beneficial   Meloxicam may help as well  Discuss x-ray results at follow-up visit    - XR spine cervical complete 4 or 5 vw non injury; Future    5  Benign essential hypertension  BP suboptimal today:  152/90 upon rooming  Repeat 150/90  States compliant with hydrochlorothiazide 12 5 daily  Reassess at follow-up visit  Likely increase HCTZ to 25  Possible addition of lisinopril if remains not well managed     - CBC and differential; Future  - Comprehensive metabolic panel;  Future  - hydrochlorothiazide (HYDRODIURIL) 12 5 mg tablet; Take 1 tablet (12 5 mg total) by mouth daily  Dispense: 90 tablet; Refill: 0    6  Enlarged prostate      7  Screening for thyroid disorder  Last TSH 03/2019:  0 97     - TSH, 3rd generation; Future    8  Screening for diabetes mellitus    - Comprehensive metabolic panel; Future    9  Screening for lipid disorders    - Lipid panel; Future            SUBJECTIVE       Patient ID: Mary Dobbs is a 62 y o  male  Chief Complaint   Patient presents with    Dizziness     Pt c/o lightheaded and joint pain for about 4 months now  HISTORY OF PRESENT ILLNESS    Patient presents today with several vague complaints  Complains of random joint pain for the last several months  Notes it most prevalent in his finger joints, knees and neck  Muscular pain  Denies any recent trauma  Takes ibuprofen on occasion  Complains of intermittent dizziness  Occurs at random times  Has been having on and off for months  Does have sinus/allergies  Intermittent use of Flonase  Denies any visual disturbance  But is not up-to-date with an eye exam   Denies any chest pain/pressure  Denies any shortness of breath        The following portions of the patient's history were reviewed and updated as appropriate: allergies, current medications, past family history, past medical history, past social history, past surgical history and problem list       REVIEW OF SYSTEMS  Review of Systems   Constitutional: Positive for fatigue  HENT: Negative  Respiratory: Negative  Cardiovascular: Negative  Gastrointestinal: Negative  Musculoskeletal: Positive for arthralgias (As described in HPI), myalgias, neck pain and neck stiffness  Neurological: Positive for dizziness  Negative for light-headedness and headaches  Psychiatric/Behavioral: Negative          OBJECTIVE      VITAL SIGNS  /90 (BP Location: Left arm, Patient Position: Sitting, Cuff Size: Adult)   Pulse 94   Temp 97 6 °F (36 4 °C) (Tympanic)   Ht 5' 9 29" (1 76 m)   Wt 84 6 kg (186 lb 9 6 oz)   SpO2 98%   BMI 27 33 kg/m²     CURRENT MEDICATIONS    Current Outpatient Medications:     cyclobenzaprine (FLEXERIL) 10 mg tablet, Take 1 tablet (10 mg total) by mouth 3 (three) times a day as needed for muscle spasms, Disp: 30 tablet, Rfl: 0    esomeprazole (NexIUM) 40 MG capsule, Take 1 capsule (40 mg total) by mouth daily, Disp: 30 capsule, Rfl: 1    loratadine (CLARITIN) 10 mg tablet, Take 1 tablet (10 mg total) by mouth daily, Disp: 30 tablet, Rfl: 2    losartan (COZAAR) 100 MG tablet, TAKE 1 TABLET DAILY ( NEED APPOINTMENT ), Disp: 90 tablet, Rfl: 4    Multiple Vitamin (MULTIVITAMIN) tablet, Take 1 tablet by mouth daily, Disp: , Rfl:     fluticasone (FLONASE) 50 mcg/act nasal spray, 1 spray into each nostril daily, Disp: 1 Bottle, Rfl: 2    hydrochlorothiazide (HYDRODIURIL) 12 5 mg tablet, Take 1 tablet (12 5 mg total) by mouth daily, Disp: 90 tablet, Rfl: 0    meloxicam (MOBIC) 15 mg tablet, Take 1 tablet (15 mg total) by mouth daily, Disp: 30 tablet, Rfl: 1      PHYSICAL EXAMINATION   Physical Exam   Constitutional: He is oriented to person, place, and time  Vital signs are normal  He appears well-developed and well-nourished  HENT:   Right Ear: Tympanic membrane and ear canal normal    Left Ear: Tympanic membrane and ear canal normal    Nose: Nose normal    Eyes: Pupils are equal, round, and reactive to light  Conjunctivae and EOM are normal    Neck: Full passive range of motion without pain  Carotid bruit is not present  No thyromegaly present  Cardiovascular: Normal rate, regular rhythm and normal heart sounds  Negative for lower extremity edema   Pulmonary/Chest: Effort normal and breath sounds normal  No respiratory distress  Musculoskeletal:        Cervical back: He exhibits normal range of motion, no tenderness, no swelling and no edema  Lymphadenopathy:        Head (right side): No submandibular and no tonsillar adenopathy present  Head (left side): No submandibular and no tonsillar adenopathy present  He has no cervical adenopathy  Neurological: He is alert and oriented to person, place, and time  He has normal strength  No cranial nerve deficit or sensory deficit  Coordination and gait normal    Skin: Skin is warm and intact  Psychiatric: He has a normal mood and affect  Thought content normal    Nursing note and vitals reviewed

## 2020-01-25 ENCOUNTER — APPOINTMENT (OUTPATIENT)
Dept: LAB | Facility: CLINIC | Age: 60
End: 2020-01-25
Payer: COMMERCIAL

## 2020-01-25 ENCOUNTER — APPOINTMENT (OUTPATIENT)
Dept: RADIOLOGY | Facility: CLINIC | Age: 60
End: 2020-01-25
Payer: COMMERCIAL

## 2020-01-25 DIAGNOSIS — Z13.220 SCREENING FOR LIPID DISORDERS: ICD-10-CM

## 2020-01-25 DIAGNOSIS — I10 BENIGN ESSENTIAL HYPERTENSION: ICD-10-CM

## 2020-01-25 DIAGNOSIS — M25.50 ARTHRALGIA, UNSPECIFIED JOINT: ICD-10-CM

## 2020-01-25 DIAGNOSIS — Z13.29 SCREENING FOR THYROID DISORDER: ICD-10-CM

## 2020-01-25 DIAGNOSIS — Z13.1 SCREENING FOR DIABETES MELLITUS: ICD-10-CM

## 2020-01-25 DIAGNOSIS — M54.2 NECK PAIN: ICD-10-CM

## 2020-01-25 LAB
ALBUMIN SERPL BCP-MCNC: 4.1 G/DL (ref 3.5–5)
ALP SERPL-CCNC: 56 U/L (ref 46–116)
ALT SERPL W P-5'-P-CCNC: 62 U/L (ref 12–78)
ANION GAP SERPL CALCULATED.3IONS-SCNC: 5 MMOL/L (ref 4–13)
AST SERPL W P-5'-P-CCNC: 29 U/L (ref 5–45)
BASOPHILS # BLD AUTO: 0.04 THOUSANDS/ΜL (ref 0–0.1)
BASOPHILS NFR BLD AUTO: 1 % (ref 0–1)
BILIRUB SERPL-MCNC: 0.5 MG/DL (ref 0.2–1)
BUN SERPL-MCNC: 20 MG/DL (ref 5–25)
CALCIUM SERPL-MCNC: 9.3 MG/DL (ref 8.3–10.1)
CHLORIDE SERPL-SCNC: 103 MMOL/L (ref 100–108)
CHOLEST SERPL-MCNC: 135 MG/DL (ref 50–200)
CO2 SERPL-SCNC: 30 MMOL/L (ref 21–32)
CREAT SERPL-MCNC: 1.07 MG/DL (ref 0.6–1.3)
EOSINOPHIL # BLD AUTO: 0.36 THOUSAND/ΜL (ref 0–0.61)
EOSINOPHIL NFR BLD AUTO: 5 % (ref 0–6)
ERYTHROCYTE [DISTWIDTH] IN BLOOD BY AUTOMATED COUNT: 12.2 % (ref 11.6–15.1)
GFR SERPL CREATININE-BSD FRML MDRD: 75 ML/MIN/1.73SQ M
GLUCOSE P FAST SERPL-MCNC: 86 MG/DL (ref 65–99)
HCT VFR BLD AUTO: 46.8 % (ref 36.5–49.3)
HDLC SERPL-MCNC: 42 MG/DL
HGB BLD-MCNC: 15.3 G/DL (ref 12–17)
IMM GRANULOCYTES # BLD AUTO: 0.01 THOUSAND/UL (ref 0–0.2)
IMM GRANULOCYTES NFR BLD AUTO: 0 % (ref 0–2)
LDLC SERPL CALC-MCNC: 77 MG/DL (ref 0–100)
LYMPHOCYTES # BLD AUTO: 2.64 THOUSANDS/ΜL (ref 0.6–4.47)
LYMPHOCYTES NFR BLD AUTO: 38 % (ref 14–44)
MCH RBC QN AUTO: 31.3 PG (ref 26.8–34.3)
MCHC RBC AUTO-ENTMCNC: 32.7 G/DL (ref 31.4–37.4)
MCV RBC AUTO: 96 FL (ref 82–98)
MONOCYTES # BLD AUTO: 0.56 THOUSAND/ΜL (ref 0.17–1.22)
MONOCYTES NFR BLD AUTO: 8 % (ref 4–12)
NEUTROPHILS # BLD AUTO: 3.32 THOUSANDS/ΜL (ref 1.85–7.62)
NEUTS SEG NFR BLD AUTO: 48 % (ref 43–75)
NONHDLC SERPL-MCNC: 93 MG/DL
NRBC BLD AUTO-RTO: 0 /100 WBCS
PLATELET # BLD AUTO: 344 THOUSANDS/UL (ref 149–390)
PMV BLD AUTO: 9.2 FL (ref 8.9–12.7)
POTASSIUM SERPL-SCNC: 4.5 MMOL/L (ref 3.5–5.3)
PROT SERPL-MCNC: 7.9 G/DL (ref 6.4–8.2)
RBC # BLD AUTO: 4.89 MILLION/UL (ref 3.88–5.62)
SODIUM SERPL-SCNC: 138 MMOL/L (ref 136–145)
TRIGL SERPL-MCNC: 79 MG/DL
TSH SERPL DL<=0.05 MIU/L-ACNC: 3.11 UIU/ML (ref 0.36–3.74)
WBC # BLD AUTO: 6.93 THOUSAND/UL (ref 4.31–10.16)

## 2020-01-25 PROCEDURE — 72050 X-RAY EXAM NECK SPINE 4/5VWS: CPT

## 2020-01-25 PROCEDURE — 80061 LIPID PANEL: CPT

## 2020-01-25 PROCEDURE — 80053 COMPREHEN METABOLIC PANEL: CPT

## 2020-01-25 PROCEDURE — 85025 COMPLETE CBC W/AUTO DIFF WBC: CPT

## 2020-01-25 PROCEDURE — 84443 ASSAY THYROID STIM HORMONE: CPT

## 2020-01-25 PROCEDURE — 86430 RHEUMATOID FACTOR TEST QUAL: CPT

## 2020-01-25 PROCEDURE — 36415 COLL VENOUS BLD VENIPUNCTURE: CPT

## 2020-01-27 LAB — RHEUMATOID FACT SER QL LA: NEGATIVE

## 2020-02-04 ENCOUNTER — OFFICE VISIT (OUTPATIENT)
Dept: FAMILY MEDICINE CLINIC | Facility: CLINIC | Age: 60
End: 2020-02-04
Payer: COMMERCIAL

## 2020-02-04 VITALS
DIASTOLIC BLOOD PRESSURE: 84 MMHG | OXYGEN SATURATION: 98 % | RESPIRATION RATE: 17 BRPM | TEMPERATURE: 97.5 F | BODY MASS INDEX: 26.81 KG/M2 | HEIGHT: 70 IN | WEIGHT: 187.25 LBS | SYSTOLIC BLOOD PRESSURE: 132 MMHG | HEART RATE: 83 BPM

## 2020-02-04 DIAGNOSIS — M54.2 NECK PAIN: ICD-10-CM

## 2020-02-04 DIAGNOSIS — I10 BENIGN ESSENTIAL HYPERTENSION: Primary | ICD-10-CM

## 2020-02-04 DIAGNOSIS — R42 ORTHOSTATIC DIZZINESS: ICD-10-CM

## 2020-02-04 PROCEDURE — 3079F DIAST BP 80-89 MM HG: CPT | Performed by: NURSE PRACTITIONER

## 2020-02-04 PROCEDURE — 1036F TOBACCO NON-USER: CPT | Performed by: NURSE PRACTITIONER

## 2020-02-04 PROCEDURE — 3075F SYST BP GE 130 - 139MM HG: CPT | Performed by: NURSE PRACTITIONER

## 2020-02-04 PROCEDURE — 99214 OFFICE O/P EST MOD 30 MIN: CPT | Performed by: NURSE PRACTITIONER

## 2020-02-04 RX ORDER — HYDROCHLOROTHIAZIDE 25 MG/1
25 TABLET ORAL DAILY
Qty: 30 TABLET | Refills: 5 | Status: SHIPPED | OUTPATIENT
Start: 2020-02-04 | End: 2022-04-01 | Stop reason: ALTCHOICE

## 2020-02-11 NOTE — PROGRESS NOTES
[facility]    ASSESSMENT AND PLAN     1  Benign essential hypertension  Presents today for a recheck  Home Bps reviewed  Averaging 140s over 90s  Orthostatic blood pressures checked in office tonight:  Sitting, 126/88-lying, 130/84-standing, 158/110  Asymptomatic  Increase hydrochlorothiazide to 25  Elliot Langston Return in 3-4 weeks for blood pressure recheck  Sooner if needed      - hydrochlorothiazide (HYDRODIURIL) 25 mg tablet; Take 1 tablet (25 mg total) by mouth daily  Dispense: 30 tablet; Refill: 5    2  Orthostatic dizziness  Complains of chronic, intermittent dizziness  Consider BP related  No bruit auscultated, but will assess carotids  - VAS carotid complete study; Future    3  Neck pain  Reviewed cervical x-ray  Discussed degenerative changes  Declines PT at this time  Reviewed symptom management  Contact if interested in PT      SUBJECTIVE       Patient ID: Zeus Teague is a 62 y o  male  Chief Complaint   Patient presents with    Follow-up     2 week recheck to BP, neck and knee pain  Pt would like to discuss recent lab work and xray results from 1/25/20  Pt states neck pain is still not better but knee pain has improved       HISTORY OF PRESENT ILLNESS    Patient presents today for a recheck  Has been monitoring his blood pressure at home  Averaging 140 over 90s  States his knee pain has resolved with the meloxicam, but he does still have ongoing neck pain  The following portions of the patient's history were reviewed and updated as appropriate: allergies, current medications, past family history, past medical history, past social history, past surgical history and problem list     REVIEW OF SYSTEMS  Review of Systems   Constitutional: Negative  Respiratory: Negative  Cardiovascular: Negative  Musculoskeletal: Positive for neck pain  Neurological: Positive for dizziness (Intermittent, ongoing for months)  Negative for headaches     Psychiatric/Behavioral: Negative  OBJECTIVE      VITAL SIGNS  /84 (BP Location: Left arm, Patient Position: Sitting, Cuff Size: Adult)   Pulse 83   Temp 97 5 °F (36 4 °C) (Tympanic)   Resp 17   Ht 5' 9 69" (1 77 m)   Wt 84 9 kg (187 lb 4 oz)   SpO2 98%   BMI 27 11 kg/m²     CURRENT MEDICATIONS    Current Outpatient Medications:     cyclobenzaprine (FLEXERIL) 10 mg tablet, Take 1 tablet (10 mg total) by mouth 3 (three) times a day as needed for muscle spasms, Disp: 30 tablet, Rfl: 0    esomeprazole (NexIUM) 40 MG capsule, Take 1 capsule (40 mg total) by mouth daily, Disp: 30 capsule, Rfl: 1    fluticasone (FLONASE) 50 mcg/act nasal spray, 1 spray into each nostril daily, Disp: 1 Bottle, Rfl: 2    loratadine (CLARITIN) 10 mg tablet, Take 1 tablet (10 mg total) by mouth daily, Disp: 30 tablet, Rfl: 2    losartan (COZAAR) 100 MG tablet, TAKE 1 TABLET DAILY ( NEED APPOINTMENT ), Disp: 90 tablet, Rfl: 4    meloxicam (MOBIC) 15 mg tablet, Take 1 tablet (15 mg total) by mouth daily, Disp: 30 tablet, Rfl: 1    Multiple Vitamin (MULTIVITAMIN) tablet, Take 1 tablet by mouth daily, Disp: , Rfl:     hydrochlorothiazide (HYDRODIURIL) 25 mg tablet, Take 1 tablet (25 mg total) by mouth daily, Disp: 30 tablet, Rfl: 5      PHYSICAL EXAMINATION   Physical Exam   Constitutional: He is oriented to person, place, and time  Vital signs are normal  He appears well-developed and well-nourished  HENT:   Right Ear: Tympanic membrane and ear canal normal    Left Ear: Tympanic membrane and ear canal normal    Neck: Carotid bruit is not present  Cardiovascular: Normal rate and regular rhythm  Negative for lower extremity edema   Pulmonary/Chest: Effort normal and breath sounds normal  No respiratory distress  Neurological: He is alert and oriented to person, place, and time  Psychiatric: He has a normal mood and affect  Thought content normal    Nursing note and vitals reviewed

## 2020-02-12 ENCOUNTER — HOSPITAL ENCOUNTER (OUTPATIENT)
Dept: NON INVASIVE DIAGNOSTICS | Facility: HOSPITAL | Age: 60
Discharge: HOME | End: 2020-02-12
Payer: COMMERCIAL

## 2020-02-12 DIAGNOSIS — R42 ORTHOSTATIC DIZZINESS: ICD-10-CM

## 2020-02-12 PROCEDURE — 93880 EXTRACRANIAL BILAT STUDY: CPT

## 2020-02-12 PROCEDURE — 93880 EXTRACRANIAL BILAT STUDY: CPT | Performed by: SURGERY

## 2020-02-13 ENCOUNTER — TELEPHONE (OUTPATIENT)
Dept: FAMILY MEDICINE CLINIC | Facility: CLINIC | Age: 60
End: 2020-02-13

## 2020-02-27 ENCOUNTER — OFFICE VISIT (OUTPATIENT)
Dept: FAMILY MEDICINE CLINIC | Facility: CLINIC | Age: 60
End: 2020-02-27
Payer: COMMERCIAL

## 2020-02-27 VITALS
WEIGHT: 186 LBS | SYSTOLIC BLOOD PRESSURE: 148 MMHG | HEIGHT: 69 IN | HEART RATE: 83 BPM | BODY MASS INDEX: 27.55 KG/M2 | OXYGEN SATURATION: 99 % | TEMPERATURE: 97 F | DIASTOLIC BLOOD PRESSURE: 86 MMHG

## 2020-02-27 DIAGNOSIS — I10 BENIGN ESSENTIAL HYPERTENSION: Primary | ICD-10-CM

## 2020-02-27 PROCEDURE — 99213 OFFICE O/P EST LOW 20 MIN: CPT | Performed by: NURSE PRACTITIONER

## 2020-02-27 PROCEDURE — 3008F BODY MASS INDEX DOCD: CPT | Performed by: NURSE PRACTITIONER

## 2020-02-27 PROCEDURE — 3079F DIAST BP 80-89 MM HG: CPT | Performed by: NURSE PRACTITIONER

## 2020-02-27 PROCEDURE — 3077F SYST BP >= 140 MM HG: CPT | Performed by: NURSE PRACTITIONER

## 2020-02-27 PROCEDURE — 1036F TOBACCO NON-USER: CPT | Performed by: NURSE PRACTITIONER

## 2020-03-03 NOTE — PROGRESS NOTES
[facility]    ASSESSMENT AND PLAN     1  Benign essential hypertension  Presents today for a blood pressure recheck  He has been taking his pressures at home  Averaging 120-130/80-90  He did not bring his machine tonight to compare with our manual   Manual here in the office, still remain on the higher side  148/86 upon rooming  142/88 recheck  Cardiopulmonary assessment unremarkable  Compliant with losartan 100  Will add hydrochlorothiazide 25 daily  Continue taking home BP is and return in 3-4 weeks for BP recheck  Reminded to bring machine along for comparison  Certainly sooner if needed  Although unlikely, reviewed S/S of hypotension  NOTE:  Additionally reviewed recent bilateral carotid studies  Recommend following up/repeat scan in 1 year  Discussed possibly starting 81 aspirin  Patient declines at this time  Lipid panel has always been well within normal limits without medication  SUBJECTIVE       Patient ID: Min Hoang is a 62 y o  male  Chief Complaint   Patient presents with    Blood Pressure Check    Follow-up     Discuss recent VAS carotid study       HISTORY OF PRESENT ILLNESS    Presents today for a blood pressure recheck  He has been taking his pressures at home  Averaging 120-130/80-90  Denies any chest pain/pressure/tightness and or shortness of breath  Denies any palpitations        The following portions of the patient's history were reviewed and updated as appropriate: allergies, current medications, past family history, past medical history, past social history, past surgical history and problem list     REVIEW OF SYSTEMS  Review of Systems   Constitutional: Negative  Respiratory: Negative  Cardiovascular: Negative  Psychiatric/Behavioral: Negative          OBJECTIVE      VITAL SIGNS  /86 (BP Location: Left arm, Patient Position: Sitting, Cuff Size: Adult)   Pulse 83   Temp (!) 97 °F (36 1 °C)   Ht 5' 8 5" (1 74 m)   Wt 84 4 kg (186 lb)   SpO2 99%   BMI 27 87 kg/m²   BP recheck:  142/88    CURRENT MEDICATIONS    Current Outpatient Medications:     cyclobenzaprine (FLEXERIL) 10 mg tablet, Take 1 tablet (10 mg total) by mouth 3 (three) times a day as needed for muscle spasms, Disp: 30 tablet, Rfl: 0    esomeprazole (NexIUM) 40 MG capsule, Take 1 capsule (40 mg total) by mouth daily, Disp: 30 capsule, Rfl: 1    hydrochlorothiazide (HYDRODIURIL) 25 mg tablet, Take 1 tablet (25 mg total) by mouth daily, Disp: 30 tablet, Rfl: 5    losartan (COZAAR) 100 MG tablet, TAKE 1 TABLET DAILY ( NEED APPOINTMENT ), Disp: 90 tablet, Rfl: 4    meloxicam (MOBIC) 15 mg tablet, Take 1 tablet (15 mg total) by mouth daily, Disp: 30 tablet, Rfl: 1    Multiple Vitamin (MULTIVITAMIN) tablet, Take 1 tablet by mouth daily, Disp: , Rfl:     fluticasone (FLONASE) 50 mcg/act nasal spray, 1 spray into each nostril daily (Patient not taking: Reported on 2/27/2020), Disp: 1 Bottle, Rfl: 2    loratadine (CLARITIN) 10 mg tablet, Take 1 tablet (10 mg total) by mouth daily (Patient not taking: Reported on 2/27/2020), Disp: 30 tablet, Rfl: 2      PHYSICAL EXAMINATION   Physical Exam   Constitutional: He is oriented to person, place, and time  Vital signs are normal  He appears well-developed and well-nourished  Cardiovascular: Normal rate, regular rhythm and normal heart sounds  Negative for lower extremity edema   Pulmonary/Chest: Effort normal and breath sounds normal  No respiratory distress  Neurological: He is alert and oriented to person, place, and time  Psychiatric: He has a normal mood and affect  Thought content normal    Nursing note and vitals reviewed

## 2020-03-30 DIAGNOSIS — K21.9 GASTROESOPHAGEAL REFLUX DISEASE, ESOPHAGITIS PRESENCE NOT SPECIFIED: ICD-10-CM

## 2020-03-30 RX ORDER — ESOMEPRAZOLE MAGNESIUM 40 MG/1
40 CAPSULE, DELAYED RELEASE ORAL DAILY
Qty: 30 CAPSULE | Refills: 1 | Status: SHIPPED | OUTPATIENT
Start: 2020-03-30 | End: 2020-07-10

## 2020-05-22 DIAGNOSIS — M25.511 CHRONIC PAIN OF BOTH SHOULDERS: ICD-10-CM

## 2020-05-22 DIAGNOSIS — G89.29 CHRONIC PAIN OF BOTH SHOULDERS: ICD-10-CM

## 2020-05-22 DIAGNOSIS — M25.512 CHRONIC PAIN OF BOTH SHOULDERS: ICD-10-CM

## 2020-05-22 RX ORDER — CYCLOBENZAPRINE HCL 10 MG
10 TABLET ORAL 3 TIMES DAILY PRN
Qty: 30 TABLET | Refills: 0 | Status: SHIPPED | OUTPATIENT
Start: 2020-05-22 | End: 2020-11-23 | Stop reason: SDUPTHER

## 2020-07-10 DIAGNOSIS — K21.9 GASTROESOPHAGEAL REFLUX DISEASE, ESOPHAGITIS PRESENCE NOT SPECIFIED: ICD-10-CM

## 2020-07-11 RX ORDER — ESOMEPRAZOLE MAGNESIUM 40 MG/1
CAPSULE, DELAYED RELEASE ORAL
Qty: 90 CAPSULE | Refills: 0 | Status: SHIPPED | OUTPATIENT
Start: 2020-07-11 | End: 2020-07-18

## 2020-07-12 DIAGNOSIS — K21.9 GASTROESOPHAGEAL REFLUX DISEASE, ESOPHAGITIS PRESENCE NOT SPECIFIED: ICD-10-CM

## 2020-07-12 RX ORDER — ESOMEPRAZOLE MAGNESIUM 40 MG/1
CAPSULE, DELAYED RELEASE ORAL
Qty: 30 CAPSULE | Refills: 0 | OUTPATIENT
Start: 2020-07-12

## 2020-07-14 ENCOUNTER — TELEPHONE (OUTPATIENT)
Dept: FAMILY MEDICINE CLINIC | Facility: CLINIC | Age: 60
End: 2020-07-14

## 2020-07-14 NOTE — TELEPHONE ENCOUNTER
Voicemail from patient requesting refill for Esomeprazole  I called him and informed him the prescription was sent 7/11/20 for 90 tablets, and reminded him that he is due for a repeat BP check in the office and to call office to schedule

## 2020-07-18 DIAGNOSIS — K21.9 GASTROESOPHAGEAL REFLUX DISEASE, ESOPHAGITIS PRESENCE NOT SPECIFIED: ICD-10-CM

## 2020-07-18 RX ORDER — ESOMEPRAZOLE MAGNESIUM 40 MG/1
CAPSULE, DELAYED RELEASE ORAL
Qty: 30 CAPSULE | Refills: 0 | Status: SHIPPED | OUTPATIENT
Start: 2020-07-18 | End: 2020-11-23 | Stop reason: SDUPTHER

## 2020-11-23 DIAGNOSIS — G89.29 CHRONIC PAIN OF BOTH SHOULDERS: ICD-10-CM

## 2020-11-23 DIAGNOSIS — M25.511 CHRONIC PAIN OF BOTH SHOULDERS: ICD-10-CM

## 2020-11-23 DIAGNOSIS — M25.512 CHRONIC PAIN OF BOTH SHOULDERS: ICD-10-CM

## 2020-11-23 DIAGNOSIS — K21.9 GASTROESOPHAGEAL REFLUX DISEASE: ICD-10-CM

## 2020-11-23 RX ORDER — CYCLOBENZAPRINE HCL 10 MG
10 TABLET ORAL 3 TIMES DAILY PRN
Qty: 30 TABLET | Refills: 0 | Status: SHIPPED | OUTPATIENT
Start: 2020-11-23 | End: 2021-02-12 | Stop reason: SDUPTHER

## 2020-11-23 RX ORDER — ESOMEPRAZOLE MAGNESIUM 40 MG/1
40 CAPSULE, DELAYED RELEASE ORAL DAILY
Qty: 30 CAPSULE | Refills: 0 | Status: SHIPPED | OUTPATIENT
Start: 2020-11-23 | End: 2021-01-13 | Stop reason: SDUPTHER

## 2021-01-06 DIAGNOSIS — I10 ESSENTIAL HYPERTENSION: ICD-10-CM

## 2021-01-06 RX ORDER — LOSARTAN POTASSIUM 100 MG/1
TABLET ORAL
Qty: 90 TABLET | Refills: 3 | Status: SHIPPED | OUTPATIENT
Start: 2021-01-06 | End: 2021-08-17 | Stop reason: SDUPTHER

## 2021-01-13 ENCOUNTER — OFFICE VISIT (OUTPATIENT)
Dept: FAMILY MEDICINE CLINIC | Facility: CLINIC | Age: 61
End: 2021-01-13
Payer: COMMERCIAL

## 2021-01-13 VITALS
BODY MASS INDEX: 26.97 KG/M2 | HEIGHT: 70 IN | HEART RATE: 100 BPM | DIASTOLIC BLOOD PRESSURE: 100 MMHG | SYSTOLIC BLOOD PRESSURE: 160 MMHG | WEIGHT: 188.4 LBS | TEMPERATURE: 97.1 F | OXYGEN SATURATION: 98 %

## 2021-01-13 DIAGNOSIS — M25.50 ARTHRALGIA, UNSPECIFIED JOINT: ICD-10-CM

## 2021-01-13 DIAGNOSIS — K21.9 GASTROESOPHAGEAL REFLUX DISEASE: ICD-10-CM

## 2021-01-13 DIAGNOSIS — M25.511 CHRONIC RIGHT SHOULDER PAIN: ICD-10-CM

## 2021-01-13 DIAGNOSIS — G89.29 CHRONIC RIGHT SHOULDER PAIN: ICD-10-CM

## 2021-01-13 DIAGNOSIS — M54.12 CERVICAL RADICULOPATHY: Primary | ICD-10-CM

## 2021-01-13 PROCEDURE — 99213 OFFICE O/P EST LOW 20 MIN: CPT | Performed by: FAMILY MEDICINE

## 2021-01-13 PROCEDURE — 3008F BODY MASS INDEX DOCD: CPT | Performed by: FAMILY MEDICINE

## 2021-01-13 PROCEDURE — 3080F DIAST BP >= 90 MM HG: CPT | Performed by: FAMILY MEDICINE

## 2021-01-13 PROCEDURE — 3077F SYST BP >= 140 MM HG: CPT | Performed by: FAMILY MEDICINE

## 2021-01-13 PROCEDURE — 3725F SCREEN DEPRESSION PERFORMED: CPT | Performed by: FAMILY MEDICINE

## 2021-01-13 PROCEDURE — 1036F TOBACCO NON-USER: CPT | Performed by: FAMILY MEDICINE

## 2021-01-13 RX ORDER — MELOXICAM 15 MG/1
15 TABLET ORAL DAILY
Qty: 30 TABLET | Refills: 1 | Status: SHIPPED | OUTPATIENT
Start: 2021-01-13 | End: 2022-01-25 | Stop reason: SDUPTHER

## 2021-01-13 RX ORDER — ESOMEPRAZOLE MAGNESIUM 40 MG/1
40 CAPSULE, DELAYED RELEASE ORAL DAILY
Qty: 30 CAPSULE | Refills: 0 | Status: SHIPPED | OUTPATIENT
Start: 2021-01-13 | End: 2021-03-05 | Stop reason: SDUPTHER

## 2021-01-13 NOTE — PROGRESS NOTES
[facility]      NAME: So Acevedo  AGE: 56 y o  SEX: male  : 1960   MRN: [id number]    DATE: 2021  TIME: 7:01 PM    Assessment and Plan     Problem List Items Addressed This Visit     None      Visit Diagnoses     Cervical radiculopathy    -  Primary    Relevant Orders    MRI shoulder right wo contrast    MRI cervical spine wo contrast    Chronic right shoulder pain        Relevant Orders    MRI shoulder right wo contrast    MRI cervical spine wo contrast    Arthralgia, unspecified joint        Relevant Medications    meloxicam (MOBIC) 15 mg tablet    Gastroesophageal reflux disease        Relevant Medications    esomeprazole (NexIUM) 40 MG capsule      Patient has overlapping symptoms of cervical radiculopathy and right shoulder pathology  He has had physical therapy for both regions without improvement in his symptoms  Would recommend MRI of both C-spine and right shoulder to determine etiology of his symptoms  Recommended he start meloxicam on a daily basis for the next 2 weeks  Return to office in:  P r n  Chief Complaint     Chief Complaint   Patient presents with    Shoulder Pain     neck pain       History of Present Illness     Patient complains of right-sided neck pain and right shoulder pain  He has reduced range of motion in his right shoulder  He was seen in the by Physical therapy over the last several months  He did not achieve much benefit  He still has limited range of motion and pain in the shoulder joint which radiates down his arm  He also has complaint of pain in the right side of his neck  He has radiating pain over the trapezius area and down to the parascapular region on the right side of his upper back  He had physical therapy for this as well with limited benefit  He does take meloxicam on and off and finds it does provide him some benefit in relieving his symptoms        The following portions of the patient's history were reviewed and updated as appropriate: allergies, current medications, past family history, past medical history, past social history, past surgical history and problem list     Review of Systems   Review of Systems   Constitutional: Negative  Respiratory: Negative  Cardiovascular: Negative  Gastrointestinal: Negative  Genitourinary: Negative  Musculoskeletal: Positive for arthralgias (Right shoulder), neck pain and neck stiffness  Neurological: Positive for weakness (Right upper extremity)  Psychiatric/Behavioral: Negative  Active Problem List     Patient Active Problem List   Diagnosis    Primary osteoarthritis of left hip    Esophageal reflux    Vitamin D deficiency    Enlarged prostate    Diffuse arthralgia    Chronic low back pain    Benign essential hypertension    Globus pharyngeus    LPRD (laryngopharyngeal reflux disease)    Vocal nodules in adults       Objective   /100 (BP Location: Right arm, Patient Position: Sitting, Cuff Size: Standard)   Pulse 100   Temp (!) 97 1 °F (36 2 °C) (Temporal)   Ht 5' 9 5" (1 765 m)   Wt 85 5 kg (188 lb 6 4 oz)   SpO2 98%   BMI 27 42 kg/m²     Physical Exam  Musculoskeletal:      Comments: Cervical spine with reduced range of motion  Tenderness and spasm right cervical paravertebral musculature as well as right parascapular region  Decreased range of motion right shoulder with tenderness in anterior joint line             Current Medications     Current Outpatient Medications:     cyclobenzaprine (FLEXERIL) 10 mg tablet, Take 1 tablet (10 mg total) by mouth 3 (three) times a day as needed for muscle spasms, Disp: 30 tablet, Rfl: 0    esomeprazole (NexIUM) 40 MG capsule, Take 1 capsule (40 mg total) by mouth daily, Disp: 30 capsule, Rfl: 0    losartan (COZAAR) 100 MG tablet, TAKE 1 TABLET DAILY (NEED APPOINTMENT), Disp: 90 tablet, Rfl: 3    meloxicam (MOBIC) 15 mg tablet, Take 1 tablet (15 mg total) by mouth daily, Disp: 30 tablet, Rfl: 1    Multiple Vitamin (MULTIVITAMIN) tablet, Take 1 tablet by mouth daily, Disp: , Rfl:     fluticasone (FLONASE) 50 mcg/act nasal spray, 1 spray into each nostril daily (Patient not taking: Reported on 2/27/2020), Disp: 1 Bottle, Rfl: 2    hydrochlorothiazide (HYDRODIURIL) 25 mg tablet, Take 1 tablet (25 mg total) by mouth daily (Patient not taking: Reported on 1/13/2021), Disp: 30 tablet, Rfl: 5    loratadine (CLARITIN) 10 mg tablet, Take 1 tablet (10 mg total) by mouth daily (Patient not taking: Reported on 2/27/2020), Disp: 30 tablet, Rfl: 2    Health Maintenance     Health Maintenance   Topic Date Due    Hepatitis C Screening  1960    HIV Screening  10/01/1973    BMI: Followup Plan  10/01/1976    Annual Physical  10/01/1976    Influenza Vaccine (1) 09/01/2020    Depression Screening PHQ  01/21/2021    BMI: Adult  01/13/2022    Colorectal Cancer Screening  03/20/2025    DTaP,Tdap,and Td Vaccines (2 - Td) 04/07/2025    Pneumococcal Vaccine: Pediatrics (0 to 5 Years) and At-Risk Patients (6 to 59 Years)  Aged Out    HIB Vaccine  Aged Out    Hepatitis B Vaccine  Aged Out    IPV Vaccine  Aged Out    Hepatitis A Vaccine  Aged Out    Meningococcal ACWY Vaccine  Aged Out    HPV Vaccine  Aged [employer] History   Administered Date(s) Administered    Tdap 04/07/2015       Zabrina Huff DO  [facility]

## 2021-01-14 ENCOUNTER — TELEPHONE (OUTPATIENT)
Dept: FAMILY MEDICINE CLINIC | Facility: CLINIC | Age: 61
End: 2021-01-14

## 2021-01-14 NOTE — TELEPHONE ENCOUNTER
lmom to call office back   needs to schedule MRI of neck and shoulder with central scheduling so Louis Hillman can begin prior [facility]

## 2021-01-14 NOTE — TELEPHONE ENCOUNTER
----- Message from Charles Lara DO sent at 1/13/2021  6:50 PM EST -----  Patient needs prior authorization for MRI of neck and MRI of shoulder  He had conservative treatment with physical therapy and medication  No improvement

## 2021-01-29 ENCOUNTER — TELEPHONE (OUTPATIENT)
Dept: FAMILY MEDICINE CLINIC | Facility: CLINIC | Age: 61
End: 2021-01-29

## 2021-01-29 NOTE — TELEPHONE ENCOUNTER
MRI of shoulder was approved, however MRI of cervical spine was denied  They want to know if they should still plan to do both MRI's          [facility]~[phone]    Please let her know Monday    They are scheduled for Tuesday      thanks

## 2021-01-29 NOTE — TELEPHONE ENCOUNTER
I would recommend getting the MRI of the shoulder 1st   If  It is negative would then recommend either physical therapy or we can try again to get the cervical spine

## 2021-02-01 NOTE — TELEPHONE ENCOUNTER
Patient aware   He will reschedule the shoulder MRI and see what the results shows to determine about the cervical  Spine MRI

## 2021-02-12 ENCOUNTER — HOSPITAL ENCOUNTER (OUTPATIENT)
Dept: MRI IMAGING | Facility: HOSPITAL | Age: 61
Discharge: HOME | End: 2021-02-12
Payer: COMMERCIAL

## 2021-02-12 DIAGNOSIS — G89.29 CHRONIC RIGHT SHOULDER PAIN: ICD-10-CM

## 2021-02-12 DIAGNOSIS — M54.12 CERVICAL RADICULOPATHY: ICD-10-CM

## 2021-02-12 DIAGNOSIS — M25.511 CHRONIC RIGHT SHOULDER PAIN: ICD-10-CM

## 2021-02-12 DIAGNOSIS — M25.511 CHRONIC PAIN OF BOTH SHOULDERS: ICD-10-CM

## 2021-02-12 DIAGNOSIS — G89.29 CHRONIC PAIN OF BOTH SHOULDERS: ICD-10-CM

## 2021-02-12 DIAGNOSIS — M25.512 CHRONIC PAIN OF BOTH SHOULDERS: ICD-10-CM

## 2021-02-12 PROCEDURE — G1004 CDSM NDSC: HCPCS

## 2021-02-12 PROCEDURE — 73221 MRI JOINT UPR EXTREM W/O DYE: CPT

## 2021-02-12 RX ORDER — CYCLOBENZAPRINE HCL 10 MG
10 TABLET ORAL 3 TIMES DAILY PRN
Qty: 30 TABLET | Refills: 0 | Status: SHIPPED | OUTPATIENT
Start: 2021-02-12 | End: 2021-05-11 | Stop reason: SDUPTHER

## 2021-02-19 ENCOUNTER — OFFICE VISIT (OUTPATIENT)
Dept: FAMILY MEDICINE CLINIC | Facility: CLINIC | Age: 61
End: 2021-02-19
Payer: COMMERCIAL

## 2021-02-19 VITALS
DIASTOLIC BLOOD PRESSURE: 82 MMHG | HEART RATE: 88 BPM | BODY MASS INDEX: 26.66 KG/M2 | WEIGHT: 186.2 LBS | OXYGEN SATURATION: 99 % | SYSTOLIC BLOOD PRESSURE: 128 MMHG | TEMPERATURE: 97 F | HEIGHT: 70 IN

## 2021-02-19 DIAGNOSIS — M25.511 CHRONIC RIGHT SHOULDER PAIN: Primary | ICD-10-CM

## 2021-02-19 DIAGNOSIS — G89.29 CHRONIC RIGHT SHOULDER PAIN: Primary | ICD-10-CM

## 2021-02-19 PROCEDURE — 20610 DRAIN/INJ JOINT/BURSA W/O US: CPT | Performed by: FAMILY MEDICINE

## 2021-02-19 PROCEDURE — 3008F BODY MASS INDEX DOCD: CPT | Performed by: FAMILY MEDICINE

## 2021-02-19 RX ORDER — TRIAMCINOLONE ACETONIDE 40 MG/ML
80 INJECTION, SUSPENSION INTRA-ARTICULAR; INTRAMUSCULAR ONCE
Status: COMPLETED | OUTPATIENT
Start: 2021-02-19 | End: 2021-02-19

## 2021-02-19 RX ADMIN — TRIAMCINOLONE ACETONIDE 80 MG: 40 INJECTION, SUSPENSION INTRA-ARTICULAR; INTRAMUSCULAR at 11:26

## 2021-02-19 NOTE — PROGRESS NOTES
Patient presents to follow-up on his right shoulder pain an MRI  MRI did show partial labral tear and severe arthritis  Large joint arthrocentesis: R glenohumeral  [facility] Protocol:  Consent: Verbal consent obtained  Risks and benefits: risks, benefits and alternatives were discussed  Consent given by: patient  Patient understanding: patient states understanding of the procedure being performed  Patient identity confirmed: verbally with patient    Supporting Documentation  Indications: pain and diagnostic evaluation   Procedure Details  Location: shoulder - R glenohumeral  Preparation: Patient was prepped and draped in the usual sterile fashion  Needle size: 25 G  Ultrasound guidance: no  Approach: posterolateral    Aspirate amount: 0 mL    Patient tolerance: patient tolerated the procedure well with no immediate complications  Dressing:  Sterile dressing applied     Right shoulder injected without incident  Await results  Patient will let me know next week how he is doing  Would consider orthopedic evaluation verses continued PT  May also need to consider MRI of cervical spine as symptoms that he expresses are possibly overlap of cervical radiculopathy and shoulder pathology

## 2021-03-05 DIAGNOSIS — K21.9 GASTROESOPHAGEAL REFLUX DISEASE: ICD-10-CM

## 2021-03-05 RX ORDER — ESOMEPRAZOLE MAGNESIUM 40 MG/1
40 CAPSULE, DELAYED RELEASE ORAL DAILY
Qty: 30 CAPSULE | Refills: 0 | Status: SHIPPED | OUTPATIENT
Start: 2021-03-05 | End: 2021-09-13 | Stop reason: SDUPTHER

## 2021-03-08 ENCOUNTER — TELEPHONE (OUTPATIENT)
Dept: FAMILY MEDICINE CLINIC | Facility: CLINIC | Age: 61
End: 2021-03-08

## 2021-03-08 DIAGNOSIS — Z13.29 SCREENING FOR THYROID DISORDER: Primary | ICD-10-CM

## 2021-03-08 DIAGNOSIS — Z13.220 SCREENING FOR LIPID DISORDERS: ICD-10-CM

## 2021-03-08 DIAGNOSIS — Z13.0 SCREENING FOR DEFICIENCY ANEMIA: ICD-10-CM

## 2021-03-08 DIAGNOSIS — Z12.5 SCREENING FOR PROSTATE CANCER: ICD-10-CM

## 2021-03-08 DIAGNOSIS — Z13.1 SCREENING FOR DIABETES MELLITUS: ICD-10-CM

## 2021-03-08 NOTE — TELEPHONE ENCOUNTER
Patient would like  bl wk orders put in for his yearly bl wk please  Please call patient when orders are in and also mail him a copy  He will have it done downstairs        thanks

## 2021-03-08 NOTE — TELEPHONE ENCOUNTER
Please let patient know the blood work orders are in the computer  We can mail him a copy but he does not need a hard copy of the orders to get it done  It will already be in the computer when he goes down stairs

## 2021-03-12 ENCOUNTER — APPOINTMENT (OUTPATIENT)
Dept: LAB | Facility: CLINIC | Age: 61
End: 2021-03-12
Payer: COMMERCIAL

## 2021-03-12 DIAGNOSIS — Z13.220 SCREENING FOR LIPID DISORDERS: ICD-10-CM

## 2021-03-12 DIAGNOSIS — Z13.0 SCREENING FOR DEFICIENCY ANEMIA: ICD-10-CM

## 2021-03-12 DIAGNOSIS — Z13.1 SCREENING FOR DIABETES MELLITUS: ICD-10-CM

## 2021-03-12 DIAGNOSIS — Z12.5 SCREENING FOR PROSTATE CANCER: ICD-10-CM

## 2021-03-12 DIAGNOSIS — Z13.29 SCREENING FOR THYROID DISORDER: ICD-10-CM

## 2021-03-12 LAB
ALBUMIN SERPL BCP-MCNC: 4.1 G/DL (ref 3.5–5)
ALP SERPL-CCNC: 55 U/L (ref 46–116)
ALT SERPL W P-5'-P-CCNC: 105 U/L (ref 12–78)
ANION GAP SERPL CALCULATED.3IONS-SCNC: 4 MMOL/L (ref 4–13)
AST SERPL W P-5'-P-CCNC: 46 U/L (ref 5–45)
BASOPHILS # BLD AUTO: 0.04 THOUSANDS/ΜL (ref 0–0.1)
BASOPHILS NFR BLD AUTO: 1 % (ref 0–1)
BILIRUB SERPL-MCNC: 0.82 MG/DL (ref 0.2–1)
BUN SERPL-MCNC: 19 MG/DL (ref 5–25)
CALCIUM SERPL-MCNC: 9.3 MG/DL (ref 8.3–10.1)
CHLORIDE SERPL-SCNC: 104 MMOL/L (ref 100–108)
CHOLEST SERPL-MCNC: 151 MG/DL (ref 50–200)
CO2 SERPL-SCNC: 31 MMOL/L (ref 21–32)
CREAT SERPL-MCNC: 1.13 MG/DL (ref 0.6–1.3)
EOSINOPHIL # BLD AUTO: 0.31 THOUSAND/ΜL (ref 0–0.61)
EOSINOPHIL NFR BLD AUTO: 4 % (ref 0–6)
ERYTHROCYTE [DISTWIDTH] IN BLOOD BY AUTOMATED COUNT: 12.3 % (ref 11.6–15.1)
GFR SERPL CREATININE-BSD FRML MDRD: 69 ML/MIN/1.73SQ M
GLUCOSE P FAST SERPL-MCNC: 101 MG/DL (ref 65–99)
HCT VFR BLD AUTO: 46.4 % (ref 36.5–49.3)
HDLC SERPL-MCNC: 55 MG/DL
HGB BLD-MCNC: 15.5 G/DL (ref 12–17)
IMM GRANULOCYTES # BLD AUTO: 0.01 THOUSAND/UL (ref 0–0.2)
IMM GRANULOCYTES NFR BLD AUTO: 0 % (ref 0–2)
LDLC SERPL CALC-MCNC: 86 MG/DL (ref 0–100)
LYMPHOCYTES # BLD AUTO: 2.7 THOUSANDS/ΜL (ref 0.6–4.47)
LYMPHOCYTES NFR BLD AUTO: 37 % (ref 14–44)
MCH RBC QN AUTO: 32.2 PG (ref 26.8–34.3)
MCHC RBC AUTO-ENTMCNC: 33.4 G/DL (ref 31.4–37.4)
MCV RBC AUTO: 96 FL (ref 82–98)
MONOCYTES # BLD AUTO: 0.65 THOUSAND/ΜL (ref 0.17–1.22)
MONOCYTES NFR BLD AUTO: 9 % (ref 4–12)
NEUTROPHILS # BLD AUTO: 3.62 THOUSANDS/ΜL (ref 1.85–7.62)
NEUTS SEG NFR BLD AUTO: 49 % (ref 43–75)
NRBC BLD AUTO-RTO: 0 /100 WBCS
PLATELET # BLD AUTO: 334 THOUSANDS/UL (ref 149–390)
PMV BLD AUTO: 9.3 FL (ref 8.9–12.7)
POTASSIUM SERPL-SCNC: 4.5 MMOL/L (ref 3.5–5.3)
PROT SERPL-MCNC: 7.6 G/DL (ref 6.4–8.2)
PSA SERPL-MCNC: 0.8 NG/ML (ref 0–4)
RBC # BLD AUTO: 4.82 MILLION/UL (ref 3.88–5.62)
SODIUM SERPL-SCNC: 139 MMOL/L (ref 136–145)
TRIGL SERPL-MCNC: 48 MG/DL
TSH SERPL DL<=0.05 MIU/L-ACNC: 2.29 UIU/ML (ref 0.36–3.74)
WBC # BLD AUTO: 7.33 THOUSAND/UL (ref 4.31–10.16)

## 2021-03-12 PROCEDURE — 80053 COMPREHEN METABOLIC PANEL: CPT

## 2021-03-12 PROCEDURE — 85025 COMPLETE CBC W/AUTO DIFF WBC: CPT

## 2021-03-12 PROCEDURE — 80061 LIPID PANEL: CPT

## 2021-03-12 PROCEDURE — 36415 COLL VENOUS BLD VENIPUNCTURE: CPT

## 2021-03-12 PROCEDURE — G0103 PSA SCREENING: HCPCS

## 2021-03-12 PROCEDURE — 84443 ASSAY THYROID STIM HORMONE: CPT

## 2021-05-11 DIAGNOSIS — M25.512 CHRONIC PAIN OF BOTH SHOULDERS: ICD-10-CM

## 2021-05-11 DIAGNOSIS — M25.511 CHRONIC PAIN OF BOTH SHOULDERS: ICD-10-CM

## 2021-05-11 DIAGNOSIS — G89.29 CHRONIC PAIN OF BOTH SHOULDERS: ICD-10-CM

## 2021-05-11 RX ORDER — CYCLOBENZAPRINE HCL 10 MG
10 TABLET ORAL 3 TIMES DAILY PRN
Qty: 30 TABLET | Refills: 0 | Status: SHIPPED | OUTPATIENT
Start: 2021-05-11 | End: 2021-07-27 | Stop reason: SDUPTHER

## 2021-07-27 DIAGNOSIS — G89.29 CHRONIC PAIN OF BOTH SHOULDERS: ICD-10-CM

## 2021-07-27 DIAGNOSIS — M25.511 CHRONIC PAIN OF BOTH SHOULDERS: ICD-10-CM

## 2021-07-27 DIAGNOSIS — M25.512 CHRONIC PAIN OF BOTH SHOULDERS: ICD-10-CM

## 2021-07-27 RX ORDER — CYCLOBENZAPRINE HCL 10 MG
10 TABLET ORAL 3 TIMES DAILY PRN
Qty: 30 TABLET | Refills: 0 | Status: SHIPPED | OUTPATIENT
Start: 2021-07-27 | End: 2021-11-11 | Stop reason: SDUPTHER

## 2021-08-03 ENCOUNTER — OFFICE VISIT (OUTPATIENT)
Dept: FAMILY MEDICINE CLINIC | Facility: CLINIC | Age: 61
End: 2021-08-03
Payer: COMMERCIAL

## 2021-08-03 VITALS
SYSTOLIC BLOOD PRESSURE: 126 MMHG | DIASTOLIC BLOOD PRESSURE: 84 MMHG | HEART RATE: 88 BPM | BODY MASS INDEX: 25.68 KG/M2 | OXYGEN SATURATION: 98 % | HEIGHT: 70 IN | TEMPERATURE: 97.8 F | WEIGHT: 179.4 LBS

## 2021-08-03 DIAGNOSIS — R00.2 INTERMITTENT PALPITATIONS: Primary | ICD-10-CM

## 2021-08-03 PROCEDURE — 1036F TOBACCO NON-USER: CPT | Performed by: FAMILY MEDICINE

## 2021-08-03 PROCEDURE — 3008F BODY MASS INDEX DOCD: CPT | Performed by: FAMILY MEDICINE

## 2021-08-03 PROCEDURE — 99214 OFFICE O/P EST MOD 30 MIN: CPT | Performed by: FAMILY MEDICINE

## 2021-08-03 NOTE — PROGRESS NOTES
Assessment/Plan:    1  Intermittent palpitations  Assessment & Plan:  Patient with intermittent palpitations of new onset  ECG, chest Xray and blood work were within normal limits in ED on 7/25/21  Patient hemodynamically stable today and asymptomatic  He was instructed to avoid dehydration, caffeine and alcohol at this time  Follow up with cardiologist as instructed  He will likely need Holter monitor for further evaluation of his symptoms  If he experiences chest pain he should go to the ED immediately  He verbalized understanding  Orders:  -     Ambulatory referral to Cardiology; Future      Subjective:      Patient ID: Remington Bunn is a 56 y o  male  HPI    Patient with PMHx of HTN here for follow up of ER visit  Patient went to the [place] [id number] ED on 7/25/21 with palpitations and sweats  Chest Xray, ECG and blood work was normal   He was discharged home in stable condition  Two nights ago he was sitting on the sofa and he felt his heart racing and irregular again for a few minutes  It resolved on its onw  This is new and he denies any history of cardiac issues  He does exercise and goes to the gym  Yesterday he did 30 minutes of cardio at the gym  He feels like he is more anxious than usual   He is currently retired and denies any recent anxiety triggers  He drinks alcohol on weekends  He did have 2 gin and tonic mixed drinks Friday night  Denies coffee, soda or caffeine beverages       The following portions of the patient's history were reviewed and updated as appropriate: allergies, current medications, past family history, past medical history, past social history, past surgical history, and problem list       Current Outpatient Medications:     cyclobenzaprine (FLEXERIL) 10 mg tablet, Take 1 tablet (10 mg total) by mouth 3 (three) times a day as needed for muscle spasms, Disp: 30 tablet, Rfl: 0    losartan (COZAAR) 100 MG tablet, TAKE 1 TABLET DAILY (NEED APPOINTMENT), Disp: 90 tablet, Rfl: 3    Multiple Vitamin (MULTIVITAMIN) tablet, Take 1 tablet by mouth daily, Disp: , Rfl:     esomeprazole (NexIUM) 40 MG capsule, Take 1 capsule (40 mg total) by mouth daily (Patient not taking: Reported on 8/3/2021), Disp: 30 capsule, Rfl: 0    fluticasone (FLONASE) 50 mcg/act nasal spray, 1 spray into each nostril daily (Patient not taking: Reported on 8/3/2021), Disp: 1 Bottle, Rfl: 2    hydrochlorothiazide (HYDRODIURIL) 25 mg tablet, Take 1 tablet (25 mg total) by mouth daily (Patient not taking: Reported on 1/13/2021), Disp: 30 tablet, Rfl: 5    loratadine (CLARITIN) 10 mg tablet, Take 1 tablet (10 mg total) by mouth daily (Patient not taking: Reported on 2/27/2020), Disp: 30 tablet, Rfl: 2    meloxicam (MOBIC) 15 mg tablet, Take 1 tablet (15 mg total) by mouth daily, Disp: 30 tablet, Rfl: 1      Review of Systems   Constitutional: Negative for chills and fever  HENT: Negative for ear pain and sore throat  Eyes: Negative for pain and visual disturbance  Respiratory: Negative for cough, chest tightness and shortness of breath  Cardiovascular: Positive for palpitations  Negative for chest pain and leg swelling  Gastrointestinal: Negative for abdominal pain and vomiting  Genitourinary: Negative for dysuria and hematuria  Musculoskeletal: Negative for arthralgias and back pain  Skin: Negative for color change and rash  Neurological: Negative for seizures and syncope  All other systems reviewed and are negative  Objective:      /84 (BP Location: Left arm, Patient Position: Sitting, Cuff Size: Large)   Pulse 88   Temp 97 8 °F (36 6 °C) (Tympanic)   Ht 5' 9 5" (1 765 m)   Wt 81 4 kg (179 lb 6 4 oz)   SpO2 98%   BMI 26 11 kg/m²          Physical Exam  Vitals and nursing note reviewed  Constitutional:       General: He is not in acute distress  Appearance: Normal appearance  He is not ill-appearing  HENT:      Head: Normocephalic and atraumatic  Eyes:      Extraocular Movements: Extraocular movements intact  Conjunctiva/sclera: Conjunctivae normal    Cardiovascular:      Rate and Rhythm: Normal rate and regular rhythm  Heart sounds: Normal heart sounds  Pulmonary:      Effort: Pulmonary effort is normal  No respiratory distress  Breath sounds: Normal breath sounds  No wheezing  Abdominal:      General: Abdomen is flat  Bowel sounds are normal  There is no distension  Palpations: Abdomen is soft  Tenderness: There is no abdominal tenderness  Musculoskeletal:         General: Normal range of motion  Right lower leg: No edema  Left lower leg: No edema  Skin:     General: Skin is warm  Neurological:      General: No focal deficit present  Mental Status: He is alert and oriented to person, place, and time  Mental status is at baseline  Psychiatric:         Mood and Affect: Mood normal          Behavior: Behavior normal          Thought Content:  Thought content normal          Judgment: Judgment normal

## 2021-08-03 NOTE — ASSESSMENT & PLAN NOTE
Patient with intermittent palpitations of new onset  ECG, chest Xray and blood work were within normal limits in ED on 7/25/21  Patient hemodynamically stable today and asymptomatic  He was instructed to avoid dehydration, caffeine and alcohol at this time  Follow up with cardiologist as instructed  He will likely need Holter monitor for further evaluation of his symptoms  If he experiences chest pain he should go to the ED immediately  He verbalized understanding

## 2021-08-04 ENCOUNTER — TELEPHONE (OUTPATIENT)
Dept: FAMILY MEDICINE CLINIC | Facility: CLINIC | Age: 61
End: 2021-08-04

## 2021-08-04 NOTE — PROGRESS NOTES
Pt called and said he called the cardiologist for a monitor and stated that the cardiologist said that we would have to order the monitor or that he needed to see the cardiologist  He asked what he should do  I told him that the referral was for him to see the cardiologist and if the cardiologist thought he needed the monitor they would order it  He stated he understood

## 2021-08-17 DIAGNOSIS — I10 ESSENTIAL HYPERTENSION: ICD-10-CM

## 2021-08-17 RX ORDER — LOSARTAN POTASSIUM 100 MG/1
100 TABLET ORAL DAILY
Qty: 90 TABLET | Refills: 3 | Status: SHIPPED | OUTPATIENT
Start: 2021-08-17 | End: 2022-08-08 | Stop reason: SDUPTHER

## 2021-08-17 NOTE — TELEPHONE ENCOUNTER
Pt called requesting Losartan to [facility]  Scripts not covered under [employer]  Last ov 8/3/21

## 2021-09-13 DIAGNOSIS — K21.9 GASTROESOPHAGEAL REFLUX DISEASE: ICD-10-CM

## 2021-09-13 RX ORDER — ESOMEPRAZOLE MAGNESIUM 40 MG/1
40 CAPSULE, DELAYED RELEASE ORAL DAILY
Qty: 30 CAPSULE | Refills: 0 | Status: SHIPPED | OUTPATIENT
Start: 2021-09-13 | End: 2021-10-14 | Stop reason: SDUPTHER

## 2021-10-14 DIAGNOSIS — K21.9 GASTROESOPHAGEAL REFLUX DISEASE: ICD-10-CM

## 2021-10-15 DIAGNOSIS — K21.9 GASTROESOPHAGEAL REFLUX DISEASE: ICD-10-CM

## 2021-10-15 RX ORDER — ESOMEPRAZOLE MAGNESIUM 40 MG/1
40 CAPSULE, DELAYED RELEASE ORAL DAILY
Qty: 90 CAPSULE | Refills: 0 | Status: SHIPPED | OUTPATIENT
Start: 2021-10-15 | End: 2022-05-12 | Stop reason: SDUPTHER

## 2021-10-15 RX ORDER — ESOMEPRAZOLE MAGNESIUM 40 MG/1
40 CAPSULE, DELAYED RELEASE ORAL DAILY
Qty: 30 CAPSULE | Refills: 0 | Status: SHIPPED | OUTPATIENT
Start: 2021-10-15 | End: 2021-10-15 | Stop reason: SDUPTHER

## 2021-11-11 DIAGNOSIS — M25.511 CHRONIC PAIN OF BOTH SHOULDERS: ICD-10-CM

## 2021-11-11 DIAGNOSIS — M25.512 CHRONIC PAIN OF BOTH SHOULDERS: ICD-10-CM

## 2021-11-11 DIAGNOSIS — G89.29 CHRONIC PAIN OF BOTH SHOULDERS: ICD-10-CM

## 2021-11-11 RX ORDER — CYCLOBENZAPRINE HCL 10 MG
10 TABLET ORAL 3 TIMES DAILY PRN
Qty: 30 TABLET | Refills: 0 | Status: SHIPPED | OUTPATIENT
Start: 2021-11-11 | End: 2022-03-02 | Stop reason: SDUPTHER

## 2022-01-25 DIAGNOSIS — M25.50 ARTHRALGIA, UNSPECIFIED JOINT: ICD-10-CM

## 2022-01-25 RX ORDER — MELOXICAM 15 MG/1
15 TABLET ORAL DAILY
Qty: 30 TABLET | Refills: 1 | Status: SHIPPED | OUTPATIENT
Start: 2022-01-25

## 2022-03-02 DIAGNOSIS — M25.512 CHRONIC PAIN OF BOTH SHOULDERS: ICD-10-CM

## 2022-03-02 DIAGNOSIS — G89.29 CHRONIC PAIN OF BOTH SHOULDERS: ICD-10-CM

## 2022-03-02 DIAGNOSIS — M25.511 CHRONIC PAIN OF BOTH SHOULDERS: ICD-10-CM

## 2022-03-03 RX ORDER — CYCLOBENZAPRINE HCL 10 MG
10 TABLET ORAL 3 TIMES DAILY PRN
Qty: 30 TABLET | Refills: 0 | Status: SHIPPED | OUTPATIENT
Start: 2022-03-03 | End: 2022-05-04 | Stop reason: SDUPTHER

## 2022-04-01 ENCOUNTER — OFFICE VISIT (OUTPATIENT)
Dept: FAMILY MEDICINE CLINIC | Facility: CLINIC | Age: 62
End: 2022-04-01
Payer: COMMERCIAL

## 2022-04-01 VITALS
TEMPERATURE: 97.3 F | SYSTOLIC BLOOD PRESSURE: 136 MMHG | OXYGEN SATURATION: 96 % | DIASTOLIC BLOOD PRESSURE: 84 MMHG | HEIGHT: 70 IN | HEART RATE: 105 BPM | WEIGHT: 187 LBS | BODY MASS INDEX: 26.77 KG/M2

## 2022-04-01 DIAGNOSIS — Z13.1 SCREENING FOR DIABETES MELLITUS: ICD-10-CM

## 2022-04-01 DIAGNOSIS — B35.1 ONYCHOMYCOSIS: ICD-10-CM

## 2022-04-01 DIAGNOSIS — Z13.220 SCREENING FOR LIPID DISORDERS: ICD-10-CM

## 2022-04-01 DIAGNOSIS — G89.29 CHRONIC RIGHT SHOULDER PAIN: Primary | ICD-10-CM

## 2022-04-01 DIAGNOSIS — R53.82 CHRONIC FATIGUE: ICD-10-CM

## 2022-04-01 DIAGNOSIS — M25.511 CHRONIC RIGHT SHOULDER PAIN: Primary | ICD-10-CM

## 2022-04-01 DIAGNOSIS — Z12.5 SCREENING FOR PROSTATE CANCER: ICD-10-CM

## 2022-04-01 PROCEDURE — 3725F SCREEN DEPRESSION PERFORMED: CPT | Performed by: FAMILY MEDICINE

## 2022-04-01 PROCEDURE — 3008F BODY MASS INDEX DOCD: CPT | Performed by: FAMILY MEDICINE

## 2022-04-01 PROCEDURE — 1036F TOBACCO NON-USER: CPT | Performed by: FAMILY MEDICINE

## 2022-04-01 PROCEDURE — 99214 OFFICE O/P EST MOD 30 MIN: CPT | Performed by: FAMILY MEDICINE

## 2022-04-01 NOTE — ASSESSMENT & PLAN NOTE
Given that patient only received brief relief from corticosteroid injection last year advised against repeating that  I do think he can take his meloxicam or regularly, especially when he is engaged in more physical activities  We also discussed the possibility of orthopedic referral which he would like to hold off on for the time being

## 2022-04-01 NOTE — PROGRESS NOTES
[facility]      NAME: Nicolás Dickerson  AGE: 59 y o  SEX: male  : 1960   MRN: [id number]    DATE: 2022  TIME: 9:20 AM    Assessment and Plan     Problem List Items Addressed This Visit     Onychomycosis     Toenail fungus appears to be relatively mild  Advised against 3 month course of Lamisil but continued use of topical medication and soaks  Chronic right shoulder pain - Primary     Given that patient only received brief relief from corticosteroid injection last year advised against repeating that  I do think he can take his meloxicam or regularly, especially when he is engaged in more physical activities  We also discussed the possibility of orthopedic referral which he would like to hold off on for the time being  Other Visit Diagnoses     Screening for diabetes mellitus        Relevant Orders    Comprehensive metabolic panel    Screening for prostate cancer        Relevant Orders    PSA, Total Screen    Chronic fatigue        Relevant Orders    CBC and differential    TSH, 3rd generation with Free T4 reflex    Testosterone, free, total    Screening for lipid disorders        Relevant Orders    Lipid Panel with Direct LDL reflex              Return to office in:  1 year    Chief Complaint     Chief Complaint   Patient presents with    Shoulder Pain     right shoulder pain, interested in injection today    Nail Problem     Bilateral feet, toenail fungus       History of Present Illness     Patient was seen for several complaints  Primary issue is right shoulder pain which is chronic  Patient had MRI proximally 1 year ago which did show severe arthritis of the glenohumeral joint with tendinosis  He did receive an injection at that time which she states gave him short-term relief  He does have meloxicam which she takes intermittently which she also says has been helpful  He also complains of chronic fatigue with decreased libido    He does exercise regularly and though retired has a very physical lifestyle and finds that he is struggling to keep his energy level up  He complains of probable toenail fungus on multiple toenails  He has used topical over-the-counter antifungal product which he feels has been somewhat helpful  The following portions of the patient's history were reviewed and updated as appropriate: allergies, current medications, past family history, past medical history, past social history, past surgical history and problem list     Review of Systems   Review of Systems   Constitutional: Positive for fatigue  Respiratory: Negative  Cardiovascular: Negative  Gastrointestinal: Negative  Genitourinary: Negative  Musculoskeletal: Positive for arthralgias (Right shoulder)  Skin:        Toenail fungus   Psychiatric/Behavioral: Negative  Active Problem List     Patient Active Problem List   Diagnosis    Primary osteoarthritis of left hip    Esophageal reflux    Vitamin D deficiency    Enlarged prostate    Diffuse arthralgia    Chronic low back pain    Benign essential hypertension    Globus pharyngeus    LPRD (laryngopharyngeal reflux disease)    Vocal nodules in adults    Intermittent palpitations    Chronic right shoulder pain    Onychomycosis       Objective   /84   Pulse 105   Temp (!) 97 3 °F (36 3 °C)   Ht 5' 10" (1 778 m)   Wt 84 8 kg (187 lb)   SpO2 96%   BMI 26 83 kg/m²     Physical Exam  Vitals and nursing note reviewed  Constitutional:       General: He is not in acute distress  Appearance: He is well-developed  He is not diaphoretic  HENT:      Head: Normocephalic and atraumatic  Eyes:      General:         Right eye: No discharge  Conjunctiva/sclera: Conjunctivae normal       Pupils: Pupils are equal, round, and reactive to light  Neck:      Thyroid: No thyromegaly  Cardiovascular:      Rate and Rhythm: Normal rate and regular rhythm     Pulmonary:      Effort: Pulmonary effort is normal  No respiratory distress  Breath sounds: Normal breath sounds  Musculoskeletal:      Cervical back: Normal range of motion  Lymphadenopathy:      Cervical: No cervical adenopathy  Skin:     General: Skin is warm and dry  Neurological:      Mental Status: He is alert and oriented to person, place, and time  Psychiatric:         Behavior: Behavior normal          Thought Content:  Thought content normal          Judgment: Judgment normal            Current Medications     Current Outpatient Medications:     cyclobenzaprine (FLEXERIL) 10 mg tablet, Take 1 tablet (10 mg total) by mouth 3 (three) times a day as needed for muscle spasms, Disp: 30 tablet, Rfl: 0    esomeprazole (NexIUM) 40 MG capsule, Take 1 capsule (40 mg total) by mouth daily, Disp: 90 capsule, Rfl: 0    losartan (COZAAR) 100 MG tablet, Take 1 tablet (100 mg total) by mouth daily Need appointment, Disp: 90 tablet, Rfl: 3    meloxicam (MOBIC) 15 mg tablet, Take 1 tablet (15 mg total) by mouth daily, Disp: 30 tablet, Rfl: 1    Multiple Vitamin (MULTIVITAMIN) tablet, Take 1 tablet by mouth daily, Disp: , Rfl:     fluticasone (FLONASE) 50 mcg/act nasal spray, 1 spray into each nostril daily (Patient not taking: Reported on 8/3/2021), Disp: 1 Bottle, Rfl: 2    loratadine (CLARITIN) 10 mg tablet, Take 1 tablet (10 mg total) by mouth daily (Patient not taking: Reported on 2/27/2020), Disp: 30 tablet, Rfl: 2    Health Maintenance     Health Maintenance   Topic Date Due    Hepatitis C Screening  Never done    COVID-19 Vaccine (1) Never done    HIV Screening  Never done    BMI: Followup Plan  Never done    Annual Physical  Never done    Influenza Vaccine (1) Never done    Depression Screening  04/01/2023    BMI: Adult  04/01/2023    Colorectal Cancer Screening  03/20/2025    DTaP,Tdap,and Td Vaccines (2 - Td or Tdap) 04/07/2025    Pneumococcal Vaccine: Pediatrics (0 to 5 Years) and At-Risk Patients (6 to 59 Years)  Aged Out    HIB Vaccine  Aged Out    Hepatitis B Vaccine  Aged Out    IPV Vaccine  Aged Out    Hepatitis A Vaccine  Aged Out    Meningococcal ACWY Vaccine  Aged Out    HPV Vaccine  Aged [employer] History   Administered Date(s) Administered    Tdap 04/07/2015       Luisito Farnsworth DO  [facility]

## 2022-04-01 NOTE — ASSESSMENT & PLAN NOTE
Toenail fungus appears to be relatively mild  Advised against 3 month course of Lamisil but continued use of topical medication and soaks

## 2022-04-06 ENCOUNTER — APPOINTMENT (OUTPATIENT)
Dept: LAB | Facility: CLINIC | Age: 62
End: 2022-04-06
Payer: COMMERCIAL

## 2022-04-06 DIAGNOSIS — R53.82 CHRONIC FATIGUE: ICD-10-CM

## 2022-04-06 DIAGNOSIS — Z13.220 SCREENING FOR LIPID DISORDERS: ICD-10-CM

## 2022-04-06 DIAGNOSIS — Z13.1 SCREENING FOR DIABETES MELLITUS: ICD-10-CM

## 2022-04-06 DIAGNOSIS — Z12.5 SCREENING FOR PROSTATE CANCER: ICD-10-CM

## 2022-04-06 LAB
ALBUMIN SERPL BCP-MCNC: 4.2 G/DL (ref 3.5–5)
ALP SERPL-CCNC: 53 U/L (ref 46–116)
ALT SERPL W P-5'-P-CCNC: 77 U/L (ref 12–78)
ANION GAP SERPL CALCULATED.3IONS-SCNC: 3 MMOL/L (ref 4–13)
AST SERPL W P-5'-P-CCNC: 46 U/L (ref 5–45)
BASOPHILS # BLD AUTO: 0.05 THOUSANDS/ΜL (ref 0–0.1)
BASOPHILS NFR BLD AUTO: 1 % (ref 0–1)
BILIRUB SERPL-MCNC: 0.96 MG/DL (ref 0.2–1)
BUN SERPL-MCNC: 23 MG/DL (ref 5–25)
CALCIUM SERPL-MCNC: 9.7 MG/DL (ref 8.3–10.1)
CHLORIDE SERPL-SCNC: 100 MMOL/L (ref 100–108)
CHOLEST SERPL-MCNC: 150 MG/DL
CO2 SERPL-SCNC: 32 MMOL/L (ref 21–32)
CREAT SERPL-MCNC: 1.12 MG/DL (ref 0.6–1.3)
EOSINOPHIL # BLD AUTO: 0.33 THOUSAND/ΜL (ref 0–0.61)
EOSINOPHIL NFR BLD AUTO: 4 % (ref 0–6)
ERYTHROCYTE [DISTWIDTH] IN BLOOD BY AUTOMATED COUNT: 12.5 % (ref 11.6–15.1)
GFR SERPL CREATININE-BSD FRML MDRD: 69 ML/MIN/1.73SQ M
GLUCOSE P FAST SERPL-MCNC: 99 MG/DL (ref 65–99)
HCT VFR BLD AUTO: 46.7 % (ref 36.5–49.3)
HDLC SERPL-MCNC: 50 MG/DL
HGB BLD-MCNC: 15.7 G/DL (ref 12–17)
IMM GRANULOCYTES # BLD AUTO: 0.02 THOUSAND/UL (ref 0–0.2)
IMM GRANULOCYTES NFR BLD AUTO: 0 % (ref 0–2)
LDLC SERPL CALC-MCNC: 89 MG/DL (ref 0–100)
LYMPHOCYTES # BLD AUTO: 2.72 THOUSANDS/ΜL (ref 0.6–4.47)
LYMPHOCYTES NFR BLD AUTO: 36 % (ref 14–44)
MCH RBC QN AUTO: 32 PG (ref 26.8–34.3)
MCHC RBC AUTO-ENTMCNC: 33.6 G/DL (ref 31.4–37.4)
MCV RBC AUTO: 95 FL (ref 82–98)
MONOCYTES # BLD AUTO: 0.72 THOUSAND/ΜL (ref 0.17–1.22)
MONOCYTES NFR BLD AUTO: 9 % (ref 4–12)
NEUTROPHILS # BLD AUTO: 3.79 THOUSANDS/ΜL (ref 1.85–7.62)
NEUTS SEG NFR BLD AUTO: 50 % (ref 43–75)
NRBC BLD AUTO-RTO: 0 /100 WBCS
PLATELET # BLD AUTO: 325 THOUSANDS/UL (ref 149–390)
PMV BLD AUTO: 9.5 FL (ref 8.9–12.7)
POTASSIUM SERPL-SCNC: 4.5 MMOL/L (ref 3.5–5.3)
PROT SERPL-MCNC: 7.9 G/DL (ref 6.4–8.2)
PSA SERPL-MCNC: 1.1 NG/ML (ref 0–4)
RBC # BLD AUTO: 4.91 MILLION/UL (ref 3.88–5.62)
SODIUM SERPL-SCNC: 135 MMOL/L (ref 136–145)
TRIGL SERPL-MCNC: 56 MG/DL
TSH SERPL DL<=0.05 MIU/L-ACNC: 4.33 UIU/ML (ref 0.45–4.5)
WBC # BLD AUTO: 7.63 THOUSAND/UL (ref 4.31–10.16)

## 2022-04-06 PROCEDURE — G0103 PSA SCREENING: HCPCS

## 2022-04-06 PROCEDURE — 36415 COLL VENOUS BLD VENIPUNCTURE: CPT

## 2022-04-06 PROCEDURE — 80061 LIPID PANEL: CPT

## 2022-04-06 PROCEDURE — 80053 COMPREHEN METABOLIC PANEL: CPT

## 2022-04-06 PROCEDURE — 84402 ASSAY OF FREE TESTOSTERONE: CPT

## 2022-04-06 PROCEDURE — 84443 ASSAY THYROID STIM HORMONE: CPT

## 2022-04-06 PROCEDURE — 85025 COMPLETE CBC W/AUTO DIFF WBC: CPT

## 2022-04-06 PROCEDURE — 84403 ASSAY OF TOTAL TESTOSTERONE: CPT

## 2022-04-07 LAB
TESTOST FREE SERPL-MCNC: 7.5 PG/ML (ref 6.6–18.1)
TESTOST SERPL-MCNC: 436 NG/DL (ref 264–916)

## 2022-05-04 DIAGNOSIS — M25.511 CHRONIC PAIN OF BOTH SHOULDERS: ICD-10-CM

## 2022-05-04 DIAGNOSIS — M25.512 CHRONIC PAIN OF BOTH SHOULDERS: ICD-10-CM

## 2022-05-04 DIAGNOSIS — G89.29 CHRONIC PAIN OF BOTH SHOULDERS: ICD-10-CM

## 2022-05-04 RX ORDER — CYCLOBENZAPRINE HCL 10 MG
10 TABLET ORAL 3 TIMES DAILY PRN
Qty: 30 TABLET | Refills: 0 | Status: SHIPPED | OUTPATIENT
Start: 2022-05-04 | End: 2022-07-18 | Stop reason: SDUPTHER

## 2022-05-11 ENCOUNTER — EVALUATION (OUTPATIENT)
Dept: PHYSICAL THERAPY | Facility: CLINIC | Age: 62
End: 2022-05-11
Payer: COMMERCIAL

## 2022-05-11 DIAGNOSIS — M25.561 ACUTE PAIN OF RIGHT KNEE: ICD-10-CM

## 2022-05-11 DIAGNOSIS — S76.111D RUPTURE OF RIGHT QUADRICEPS TENDON, SUBSEQUENT ENCOUNTER: Primary | ICD-10-CM

## 2022-05-11 PROCEDURE — 97110 THERAPEUTIC EXERCISES: CPT | Performed by: PHYSICAL THERAPIST

## 2022-05-11 PROCEDURE — 97161 PT EVAL LOW COMPLEX 20 MIN: CPT | Performed by: PHYSICAL THERAPIST

## 2022-05-11 NOTE — LETTER
May 11, 2022    Quentin Mcbride MD  [address] [place] [id number]    Patient: Celso Ocampo   YOB: 1960   Date of Visit: 2022     Encounter Diagnosis     ICD-10-CM    1  Rupture of right quadriceps tendon, subsequent encounter  S76 111D    2  Acute pain of right knee  M25 561        Dear Dr Leigha Crowley:    Thank you for your recent referral of Celso Ocampo  Please review the attached evaluation summary from Kennedy's recent visit  Please verify that you agree with the plan of care by signing the attached order  If you have any questions or concerns, please do not hesitate to call  I sincerely appreciate the opportunity to share in the care of one of your patients and hope to have another opportunity to work with you in the near future  Sincerely,    Krupa Romano, PT      Referring Provider:      I certify that I have read the below Plan of Care and certify the need for these services furnished under this plan of treatment while under my care  Quentin Mcbride MD  [address]  Via Fax: [phone]          PT Evaluation     Today's date: 2022  Patient name: Celso Ocampo  : 1960  MRN: [id number]  Referring provider: Stephanie Palacios  Dx:   Encounter Diagnosis     ICD-10-CM    1  Rupture of right quadriceps tendon, subsequent encounter  S76 111D    2  Acute pain of right knee  M25 561                   Assessment  Assessment details: Celso Ocampo is a 59 y o  male presenting to physical therapy s/p right quadriceps tendon repair on 2022 and presents with pain, decreased range of motion, decreased strength, gait/balance dysfunction and decreased activity tolerance  Assessment reveals PROM, strength and functional deficits consistent with right quad tendon repair 1 weeks post-op    Secondary to these impairments, patient has increased difficulty performing ADL's and household casandra Crain would benefit from skilled PT to address these issues and maximize function  Thank you for the referral     Impairments: abnormal gait, abnormal or restricted ROM, abnormal movement, activity intolerance, impaired balance, impaired physical strength, lacks appropriate home exercise program, pain with function and weight-bearing intolerance  Understanding of Dx/Px/POC: excellent  Goals  STG (6-8 weeks)  1  Patient will be independent with HEP  2  Decrease pain at worst by 2 points on NPRS  3  Increase right knee PROM/AROM to WNL  4  Patient will demonstrate ability to ambulate with normal mechanics and will have weaned from t-scope brace  LTG (16+ weeks)  1  Decrease pain at worst from 4 points on NPRS  2  Patient will demonstrate normal stair mechanics with a reciprocal pattern  3  Increase R = L quad strength for improved ADL and leisure function  4  Patient will demonstrate ability to perform single leg hop test 90% equal to the uninvolved LE  5  Increase FOTO > or equal to expected outcome    Plan  Patient would benefit from: skilled PT  Planned therapy interventions: joint mobilization, manual therapy, patient education, neuromuscular re-education, strengthening, stretching, therapeutic activities, therapeutic exercise, transfer training, home exercise program, functional ROM exercises, balance/weight bearing training and ADL training  Frequency: 2x week  Duration in weeks: 8  Treatment plan discussed with: patient        Subjective Evaluation    History of Present Illness  Mechanism of injury: Patient presents to outpatient PT s/p right quadriceps tendon repair on 05/05/2022  Patient states that the injury occurred when he was draining his pool with a sump pump and tripped when he saw the pump falling into the pool  Since surgery, patient was placed in a t-scope brace locked into extension with and non weight bearing until starting PT    Patient notes that his pain is currently worse with mobility/ambulation and improved with rest, ice and medications  Patient is retired but has difficulty performing his normal ADL's and leisure functions (golf and home renovations) as a result  Patients goals for PT are to decrease the pain, increased strength and improve ROM for return to PLOF  Not a recurrent problem   Quality of life: excellent    Pain  Current pain ratin  At best pain ratin  At worst pain ratin  Quality: dull ache, burning and radiating  Relieving factors: rest, relaxation and medications  Aggravating factors: standing and walking  Progression: worsening    Social Support  Steps to enter house: yes  Stairs in house: yes   Lives in: multiple-level home  Lives with: spouse    Employment status: not working  Hand dominance: right      Diagnostic Tests  MRI studies: abnormal  Treatments  Current treatment: immobilization and medication  Patient Goals  Patient goals for therapy: increased strength, independence with ADLs/IADLs, return to sport/leisure activities, increased motion, decreased pain, improved balance and decreased edema          Objective     Observations     Right Knee   Positive for edema and incision  Additional Observation Details  Low risk for DVT per Wells criteria    (-) pitting edema    - Incision is clean, dry and without exudate    Neurological Testing     Sensation     Knee     Right Knee   Intact: light touch     Passive Range of Motion     Right Knee   Flexion: 35 degrees with pain  Extension: 0 degrees     Mobility   Patellar Mobility:     Right Knee   Hypomobile: medial, lateral, superior and inferior     Strength/Myotome Testing     Additional Strength Details  *MMT deferred per post-operative restrictions    Ambulation     Ambulation: Level Surfaces     Additional Level Surfaces Ambulation Details  Patient using 4 pt walker with non weight bearing of the right LE prior to evaluation    Following gait training, patient able to demonstrate improved weight bearing of the right LE with a step to pattern using the 4pt walker            Ambulation: Stairs     Additional Stairs Ambulation Details  Up with the left LE, down with the right LE with B/L rails             Precautions: HTN, Chronic Pain, GERD, s/p right quadriceps tendon repair on 05/05/2022 WBAT      Manuals 5/11            R knee PROM (0-40° until 2 weeks); progress 35° every 2 weeks: goal of 110° by 6 weeks post-op             Patellar mobilizations                                       Neuro Re-Ed                                                                                                        Ther Ex             R heel slides 2x10 x10"            R quad sets 2x10 x5"            Weight shifts 2x10            NMES w/ quad sets             Update TE per protocol NV                                                   Ther Activity             Stair and gait training 10'                         Gait Training                                       Modalities

## 2022-05-11 NOTE — PROGRESS NOTES
PT Evaluation     Today's date: 2022  Patient name: Celso Ocampo  : 1960  MRN: [id number]  Referring provider: Rosemary Rae*  Dx:   Encounter Diagnosis     ICD-10-CM    1  Rupture of right quadriceps tendon, subsequent encounter  S76 111D    2  Acute pain of right knee  M25 561                   Assessment  Assessment details: Celso Ocampo is a 59 y o  male presenting to physical therapy s/p right quadriceps tendon repair on 2022 and presents with pain, decreased range of motion, decreased strength, gait/balance dysfunction and decreased activity tolerance  Assessment reveals PROM, strength and functional deficits consistent with right quad tendon repair 1 weeks post-op  Secondary to these impairments, patient has increased difficulty performing ADL's and household chores  Krysten Griggs would benefit from skilled PT to address these issues and maximize function  Thank you for the referral     Impairments: abnormal gait, abnormal or restricted ROM, abnormal movement, activity intolerance, impaired balance, impaired physical strength, lacks appropriate home exercise program, pain with function and weight-bearing intolerance  Understanding of Dx/Px/POC: excellent  Goals  STG (6-8 weeks)  1  Patient will be independent with HEP  2  Decrease pain at worst by 2 points on NPRS  3  Increase right knee PROM/AROM to WNL  4  Patient will demonstrate ability to ambulate with normal mechanics and will have weaned from t-scope brace  LTG (16+ weeks)  1  Decrease pain at worst from 4 points on NPRS  2  Patient will demonstrate normal stair mechanics with a reciprocal pattern  3  Increase R = L quad strength for improved ADL and leisure function  4  Patient will demonstrate ability to perform single leg hop test 90% equal to the uninvolved LE  5   Increase FOTO > or equal to expected outcome    Plan  Patient would benefit from: skilled PT  Planned therapy interventions: joint mobilization, manual therapy, patient education, neuromuscular re-education, strengthening, stretching, therapeutic activities, therapeutic exercise, transfer training, home exercise program, functional ROM exercises, balance/weight bearing training and ADL training  Frequency: 2x week  Duration in weeks: 8  Treatment plan discussed with: patient        Subjective Evaluation    History of Present Illness  Mechanism of injury: Patient presents to outpatient PT s/p right quadriceps tendon repair on 2022  Patient states that the injury occurred when he was draining his pool with a sump pump and tripped when he saw the pump falling into the pool  Since surgery, patient was placed in a t-scope brace locked into extension with and non weight bearing until starting PT  Patient notes that his pain is currently worse with mobility/ambulation and improved with rest, ice and medications  Patient is retired but has difficulty performing his normal ADL's and leisure functions (golf and home renovations) as a result  Patients goals for PT are to decrease the pain, increased strength and improve ROM for return to PLOF             Not a recurrent problem   Quality of life: excellent    Pain  Current pain ratin  At best pain ratin  At worst pain ratin  Quality: dull ache, burning and radiating  Relieving factors: rest, relaxation and medications  Aggravating factors: standing and walking  Progression: worsening    Social Support  Steps to enter house: yes  Stairs in house: yes   Lives in: multiple-level home  Lives with: spouse    Employment status: not working  Hand dominance: right      Diagnostic Tests  MRI studies: abnormal  Treatments  Current treatment: immobilization and medication  Patient Goals  Patient goals for therapy: increased strength, independence with ADLs/IADLs, return to sport/leisure activities, increased motion, decreased pain, improved balance and decreased edema          Objective     Observations     Right Knee   Positive for edema and incision  Additional Observation Details  Low risk for DVT per Wells criteria    (-) pitting edema    - Incision is clean, dry and without exudate    Neurological Testing     Sensation     Knee     Right Knee   Intact: light touch     Passive Range of Motion     Right Knee   Flexion: 35 degrees with pain  Extension: 0 degrees     Mobility   Patellar Mobility:     Right Knee   Hypomobile: medial, lateral, superior and inferior     Strength/Myotome Testing     Additional Strength Details  *MMT deferred per post-operative restrictions    Ambulation     Ambulation: Level Surfaces     Additional Level Surfaces Ambulation Details  Patient using 4 pt walker with non weight bearing of the right LE prior to evaluation  Following gait training, patient able to demonstrate improved weight bearing of the right LE with a step to pattern using the 4pt walker            Ambulation: Stairs     Additional Stairs Ambulation Details  Up with the left LE, down with the right LE with B/L rails             Precautions: HTN, Chronic Pain, GERD, s/p right quadriceps tendon repair on 05/05/2022 WBAT      Manuals 5/11            R knee PROM (0-40° until 2 weeks); progress 35° every 2 weeks: goal of 110° by 6 weeks post-op             Patellar mobilizations                                       Neuro Re-Ed                                                                                                        Ther Ex             R heel slides 2x10 x10"            R quad sets 2x10 x5"            Weight shifts 2x10            NMES w/ quad sets             Update TE per protocol NV                                                   Ther Activity             Stair and gait training 10'                         Gait Training                                       Modalities

## 2022-05-12 DIAGNOSIS — K21.9 GASTROESOPHAGEAL REFLUX DISEASE: ICD-10-CM

## 2022-05-13 ENCOUNTER — OFFICE VISIT (OUTPATIENT)
Dept: PHYSICAL THERAPY | Facility: CLINIC | Age: 62
End: 2022-05-13
Payer: COMMERCIAL

## 2022-05-13 DIAGNOSIS — S76.111D RUPTURE OF RIGHT QUADRICEPS TENDON, SUBSEQUENT ENCOUNTER: Primary | ICD-10-CM

## 2022-05-13 DIAGNOSIS — M25.561 ACUTE PAIN OF RIGHT KNEE: ICD-10-CM

## 2022-05-13 PROCEDURE — 97110 THERAPEUTIC EXERCISES: CPT | Performed by: PHYSICAL THERAPIST

## 2022-05-13 PROCEDURE — 97140 MANUAL THERAPY 1/> REGIONS: CPT | Performed by: PHYSICAL THERAPIST

## 2022-05-13 RX ORDER — ESOMEPRAZOLE MAGNESIUM 40 MG/1
40 CAPSULE, DELAYED RELEASE ORAL DAILY
Qty: 90 CAPSULE | Refills: 0 | Status: SHIPPED | OUTPATIENT
Start: 2022-05-13

## 2022-05-13 NOTE — PROGRESS NOTES
Daily Note     Today's date: 2022  Patient name: Marilee Solano  : 1960  MRN: [id number]  Referring provider: Lisa Martinez*  Dx:   Encounter Diagnosis     ICD-10-CM    1  Rupture of right quadriceps tendon, subsequent encounter  S76 111D    2  Acute pain of right knee  M25 561                   Subjective: Patient reports HEP compliance and notes decreased discomfort since IE  Objective: See treatment diary below    R knee PROM: 0-55° and pain free    Assessment: Patient was educated to perform STM to the knee secondary to mild pitting edema  No pitting edema notes in the distal LE  Right knee PROM improving per above and is pain free in this range  Significant improvement in quad activation present with overflow technique  Good tolerance to NMES and will review this with patients home unit next visit  Plan: Continue per plan of care        Precautions: HTN, Chronic Pain, GERD, s/p right quadriceps tendon repair on 2022 WBAT      Manuals            R knee PROM (0-40° until 2 weeks); progress 35° every 2 weeks: goal of 110° by 6 weeks post-op  10'           Patellar mobilizations  G4           R knee STM  5'                        Neuro Re-Ed                                                                                                        Ther Ex             R heel slides 2x10 x10" 2x10 x10"           R quad sets 2x10 x5" 2x10 x5"           Weight shifts 2x10 2x10           NMES w/ quad sets  5'  5" on /10s off           Update TE per protocol NV NV                                                  Ther Activity             Stair and gait training 10'                         Gait Training                                       Modalities

## 2022-05-16 ENCOUNTER — OFFICE VISIT (OUTPATIENT)
Dept: PHYSICAL THERAPY | Facility: CLINIC | Age: 62
End: 2022-05-16
Payer: COMMERCIAL

## 2022-05-16 DIAGNOSIS — S76.111D RUPTURE OF RIGHT QUADRICEPS TENDON, SUBSEQUENT ENCOUNTER: Primary | ICD-10-CM

## 2022-05-16 DIAGNOSIS — M25.561 ACUTE PAIN OF RIGHT KNEE: ICD-10-CM

## 2022-05-16 PROCEDURE — 97140 MANUAL THERAPY 1/> REGIONS: CPT | Performed by: PHYSICAL THERAPIST

## 2022-05-16 PROCEDURE — 97110 THERAPEUTIC EXERCISES: CPT | Performed by: PHYSICAL THERAPIST

## 2022-05-16 NOTE — PROGRESS NOTES
Daily Note     Today's date: 2022  Patient name: Delon Martin  : 1960  MRN: [id number]  Referring provider: Ether Sicard*  Dx:   Encounter Diagnosis     ICD-10-CM    1  Rupture of right quadriceps tendon, subsequent encounter  S76 111D    2  Acute pain of right knee  M25 561                   Subjective: Patient reports HEP compliance and improved walking/weight bearing noted  Objective: See treatment diary below    3-65° of right knee PROM    Assessment: Patient demonstrating continued improvement in right knee flexion PROM per above  Mild loss of knee extension PROM but this improved throughout session with manuals  Educated patient to spend time in a heel prop position to address this at home  Significant improvement in quad activation for TE progression next visit for SLRx3  Plan: Continue per plan of care        Precautions: HTN, Chronic Pain, GERD, s/p right quadriceps tendon repair on 2022 WBAT      Manuals           R knee PROM (0-40° until 2 weeks); progress 35° every 2 weeks: goal of 110° by 6 weeks post-op  10' 10'          Patellar mobilizations  G4 G4          R knee STM  5' 5'                       Neuro Re-Ed                                                                                                        Ther Ex             R heel slides 2x10 x10" 2x10 x10" 2x10 x5"          R quad sets 2x10 x5" 2x10 x5" 2x10 x5"          Weight shifts 2x10 2x10           NMES w/ quad sets  5'  5" on /10s off 5', 5" on 10s off          Update TE per protocol NV NV           Biodex weight shifts   2'          Supine SLR x3   NV                       Ther Activity             Stair and gait training 10'                         Gait Training                                       Modalities

## 2022-05-19 ENCOUNTER — OFFICE VISIT (OUTPATIENT)
Dept: PHYSICAL THERAPY | Facility: CLINIC | Age: 62
End: 2022-05-19
Payer: COMMERCIAL

## 2022-05-19 DIAGNOSIS — S76.111D RUPTURE OF RIGHT QUADRICEPS TENDON, SUBSEQUENT ENCOUNTER: Primary | ICD-10-CM

## 2022-05-19 DIAGNOSIS — M25.561 ACUTE PAIN OF RIGHT KNEE: ICD-10-CM

## 2022-05-19 PROCEDURE — 97140 MANUAL THERAPY 1/> REGIONS: CPT

## 2022-05-19 PROCEDURE — 97110 THERAPEUTIC EXERCISES: CPT

## 2022-05-19 NOTE — PROGRESS NOTES
Daily Note     Today's date: 2022  Patient name: Andra Carrel  : 1960  MRN: [id number]  Referring provider: Keaton Machuca*  Dx:   Encounter Diagnosis     ICD-10-CM    1  Rupture of right quadriceps tendon, subsequent encounter  S76 111D    2  Acute pain of right knee  M25 561                   Subjective: Patient reported continued compliance with HEP and brace  Feels he is making good progress at this time  Objective: See treatment diary below  Assessment: Continued lack of full TKE by a few degrees with knee flex making more progress with each visit  Quad inhibition also improving  Will continue to follow post-op protocol at this time and focus on protection phase  Plan: Continue per plan of care             Precautions: HTN, Chronic Pain, GERD, s/p right quadriceps tendon repair on 2022 WBAT    Manuals          R knee PROM (0-40° until 2 weeks); progress 35° every 2 weeks: goal of 110° by 6 weeks post-op  10' 10' [facility]         Patellar mobilizations  G4 G4 PROM - EH         R knee STM  5' 5' [facility]                      Neuro Re-Ed                                                                                                        Ther Ex             R heel slides 2x10 x10" 2x10 x10" 2x10 x5" 5" hold, 2x10         R quad sets 2x10 x5" 2x10 x5" 2x10 x5" 5" hold, 2x10         Weight shifts 2x10 2x10           NMES w/ quad sets  5'  5" on /10s off 5', 5" on 10s off          Update TE per protocol NV NV           Biodex weight shifts   2' 2 min         Supine SLR x3   NV 2x10                      Ther Activity             Stair and gait training 10'                         Gait Training                                       Modalities

## 2022-05-23 ENCOUNTER — OFFICE VISIT (OUTPATIENT)
Dept: PHYSICAL THERAPY | Facility: CLINIC | Age: 62
End: 2022-05-23
Payer: COMMERCIAL

## 2022-05-23 DIAGNOSIS — M25.561 ACUTE PAIN OF RIGHT KNEE: ICD-10-CM

## 2022-05-23 DIAGNOSIS — S76.111D RUPTURE OF RIGHT QUADRICEPS TENDON, SUBSEQUENT ENCOUNTER: Primary | ICD-10-CM

## 2022-05-23 PROCEDURE — 97140 MANUAL THERAPY 1/> REGIONS: CPT | Performed by: PHYSICAL THERAPIST

## 2022-05-23 PROCEDURE — 97110 THERAPEUTIC EXERCISES: CPT | Performed by: PHYSICAL THERAPIST

## 2022-05-23 NOTE — PROGRESS NOTES
Daily Note     Today's date: 2022  Patient name: Susan Dupree  : 1960  MRN: [id number]  Referring provider: Tati Spear*  Dx:   Encounter Diagnosis     ICD-10-CM    1  Rupture of right quadriceps tendon, subsequent encounter  S76 111D    2  Acute pain of right knee  M25 561        Start Time: 1505  Stop Time: 1555  Total time in clinic (min): 50 minutes    Subjective: Patient reports HEP compliance and states that he has minimal discomfort and remains compliant with his HEP  Objective: See treatment diary below    R knee PROM: 0-85°    Assessment: Right knee PROM continues to improve and is progressing per post-op protocol  Mild extension lag remains present but improved each session as patient demonstrates a greater focus on TKE with his HEP  With quad set following manuals, patient was able to demonstrate terminal extension and could perform a SLR without a quad lag  Added multi-hip x3 with brace and patient will perform for his HEP  Plan: Continue per plan of care  Precautions: HTN, Chronic Pain, GERD, s/p right quadriceps tendon repair on 2022 WBAT    Manuals         R knee PROM (0-40° until 2 weeks); progress 35° every 2 weeks: goal of 110° by 6 weeks post-op  10' 10' [facility] 10'        Patellar mobilizations  G4 G4 PROM - EH G4        R knee STM  5' 5' [facility] 5'                     Neuro Re-Ed                                                                                                        Ther Ex             R heel slides 2x10 x10" 2x10 x10" 2x10 x5" 5" hold, 2x10 2x10 x5"        R quad sets 2x10 x5" 2x10 x5" 2x10 x5" 5" hold, 2x10 2x10 x5"        Weight shifts 2x10 2x10           NMES w/ quad sets  5'  5" on /10s off 5', 5" on 10s off          Update TE per protocol NV NV           Biodex Maze   2' 2 min L1  x3        Supine SLR x3   NV 2x10 2x10 ea          Foam weight shifts     2x10        HS isometric     2x10 x3" Ther Activity             Stair and gait training 10'                         Gait Training                                       Modalities

## 2022-05-26 ENCOUNTER — OFFICE VISIT (OUTPATIENT)
Dept: PHYSICAL THERAPY | Facility: CLINIC | Age: 62
End: 2022-05-26
Payer: COMMERCIAL

## 2022-05-26 DIAGNOSIS — S76.111D RUPTURE OF RIGHT QUADRICEPS TENDON, SUBSEQUENT ENCOUNTER: Primary | ICD-10-CM

## 2022-05-26 DIAGNOSIS — M25.561 ACUTE PAIN OF RIGHT KNEE: ICD-10-CM

## 2022-05-26 PROCEDURE — 97110 THERAPEUTIC EXERCISES: CPT

## 2022-05-26 PROCEDURE — 97140 MANUAL THERAPY 1/> REGIONS: CPT

## 2022-05-26 NOTE — PROGRESS NOTES
Daily Note     Today's date: 2022  Patient name: To Panchal  : 1960  MRN: [id number]  Referring provider: Herman Gracia*  Dx:   Encounter Diagnosis     ICD-10-CM    1  Rupture of right quadriceps tendon, subsequent encounter  S76 111D    2  Acute pain of right knee  M25 561                   Subjective: Patient remains compliant with brace locked in full ext for amb and completes exercises three times a day  Feels his motion is improving  Objective: See treatment diary below  R knee flex AAROM 100°  Assessment: See knee flex measurement above  Continues to make good progress at this current time and is demonstrating a decrease in quad inhibition despite having continued ext lag secondary to weakness needing brace locked in full ext to complete  Encouraged continued HEP compliance to work on ROM and quad strength to safely transition to next phase of protocol when able  Plan: Continue per plan of care  Precautions: HTN, Chronic Pain, GERD, s/p right quadriceps tendon repair on 2022 WBAT    Manuals        R knee PROM (0-40° until 2 weeks); progress 35° every 2 weeks: goal of 110° by 6 weeks post-op  10' 10' [facility] 10' [facility]       Patellar mobilizations  G4 G4 PROM - EH G4 PROM - EH       R knee STM  5' 5' [facility] 5' [facility]                    Neuro Re-Ed                                                                                                        Ther Ex             R heel slides 2x10 x10" 2x10 x10" 2x10 x5" 5" hold, 2x10 2x10 x5" 5" hold, 2x10       R quad sets 2x10 x5" 2x10 x5" 2x10 x5" 5" hold, 2x10 2x10 x5" 5" hold, 2x10       Weight shifts 2x10 2x10           NMES w/ quad sets  5'  5" on /10s off 5', 5" on 10s off          Update TE per protocol NV NV           Biodex Maze   2' 2 min L1  x3 L1  x3       Supine SLR x3   NV 2x10 2x10 ea   2x10  ea       Foam weight shifts     2x10 2x10       HS isometric     2x10 x3"  3" hold,   2x10 Ther Activity             Stair and gait training 10'                         Gait Training                                       Modalities

## 2022-06-01 ENCOUNTER — OFFICE VISIT (OUTPATIENT)
Dept: PHYSICAL THERAPY | Facility: CLINIC | Age: 62
End: 2022-06-01
Payer: COMMERCIAL

## 2022-06-01 DIAGNOSIS — M25.561 ACUTE PAIN OF RIGHT KNEE: ICD-10-CM

## 2022-06-01 DIAGNOSIS — S76.111D RUPTURE OF RIGHT QUADRICEPS TENDON, SUBSEQUENT ENCOUNTER: Primary | ICD-10-CM

## 2022-06-01 PROCEDURE — 97110 THERAPEUTIC EXERCISES: CPT | Performed by: PHYSICAL THERAPIST

## 2022-06-01 PROCEDURE — 97140 MANUAL THERAPY 1/> REGIONS: CPT | Performed by: PHYSICAL THERAPIST

## 2022-06-01 NOTE — PROGRESS NOTES
Daily Note     Today's date: 2022  Patient name: Viet Camacho  : 1960  MRN: [id number]  Referring provider: Graeme Thorne*  Dx:   Encounter Diagnosis     ICD-10-CM    1  Rupture of right quadriceps tendon, subsequent encounter  S76 111D    2  Acute pain of right knee  M25 561                   Subjective: Patient reports HEP compliance and notes that he is walking 6 blocks each day without difficulty  Objective: See treatment diary below      Assessment: Patient demonstrating full knee extension PROM/AROM today and knee flexion continues to improve per above  Time spent improving TKE and quad setting to avoid the quad lag with SLR  Patient continues to make steady functional and strength progress per below  Plan: Continue per plan of care  Precautions: HTN, Chronic Pain, GERD, s/p right quadriceps tendon repair on 2022 WBAT    Manuals       R knee PROM (0-40° until 2 weeks); progress 35° every 2 weeks: goal of 110° by 6 weeks post-op  10' 10' [facility] 10' [facility] 10'      Patellar mobilizations  G4 G4 PROM - EH G4 PROM - EH G4      R medial knee STM  5' 5' [facility] 5' [facility] 5'                   Neuro Re-Ed                                                                                                        Ther Ex             R heel slides 2x10 x10" 2x10 x10" 2x10 x5" 5" hold, 2x10 2x10 x5" 5" hold, 2x10 2x10 x5"      R quad sets 2x10 x5" 2x10 x5" 2x10 x5" 5" hold, 2x10 2x10 x5" 5" hold, 2x10 2x10 x5"      Weight shifts 2x10 2x10           NMES w/ quad sets  5'  5" on /10s off 5', 5" on 10s off          Update TE per protocol NV NV           Biodex Maze   2' 2 min L1  x3 L1  x3 L2  x2      Supine SLR x3   NV 2x10 2x10 ea  2x10  ea 3x10 ea        Foam balance w/ ball toss     2x10 2x10 2x10      HS isometric     2x10 x3"  3" hold,   2x10 2x10 x3"      Standing TKE       Green  2x10 x3"      Ther Activity             Stair and gait training 10' Gait Training                                       Modalities

## 2022-06-03 ENCOUNTER — OFFICE VISIT (OUTPATIENT)
Dept: PHYSICAL THERAPY | Facility: CLINIC | Age: 62
End: 2022-06-03
Payer: COMMERCIAL

## 2022-06-03 DIAGNOSIS — M25.561 ACUTE PAIN OF RIGHT KNEE: ICD-10-CM

## 2022-06-03 DIAGNOSIS — S76.111D RUPTURE OF RIGHT QUADRICEPS TENDON, SUBSEQUENT ENCOUNTER: Primary | ICD-10-CM

## 2022-06-03 PROCEDURE — 97110 THERAPEUTIC EXERCISES: CPT | Performed by: PHYSICAL THERAPIST

## 2022-06-03 PROCEDURE — 97140 MANUAL THERAPY 1/> REGIONS: CPT | Performed by: PHYSICAL THERAPIST

## 2022-06-03 NOTE — PROGRESS NOTES
Daily Note     Today's date: 6/3/2022  Patient name: Devin Bhatti  : 1960  MRN: [id number]  Referring provider: Beck Stevens*  Dx:   Encounter Diagnosis     ICD-10-CM    1  Rupture of right quadriceps tendon, subsequent encounter  S76 111D    2  Acute pain of right knee  M25 561                   Subjective: Patient reports continued HEP compliance and minimal discomfort  No pain or discomfort with walking since d/c of RW  Objective: See treatment diary below    R knee PROM: 0-106°    Assessment: Patient demonstrates continued passive knee ROM gains as per above and is on pace to hit 110° by week 6  Patient continues to demonstrate a quad lag with SLR, thus requiring the t-scope brace for added stability  Otherwise patient continues to make significant progress  Plan: Continue per plan of care  Precautions: HTN, Chronic Pain, GERD, s/p right quadriceps tendon repair on 2022 WBAT    Manuals 5/11 5/13 5/16 5/19 5/23 5/26 6/1 6/3     R knee PROM (0-40° until 2 weeks); progress 35° every 2 weeks: goal of 110° by 6 weeks post-op  10' 10' [facility] [facility] 10' 1404 [facility] 10' 5'     Patellar mobilizations  G4 G4 PROM - EH G4 PROM - EH G4 G4     R medial knee STM  5' 5' 1404 [facility] 5' 1404 [facility] 5' 5'                  Neuro Re-Ed                                                                                                        Ther Ex             R heel slides 2x10 x10" 2x10 x10" 2x10 x5" 5" hold, 2x10 2x10 x5" 5" hold, 2x10 2x10 x5" 2x10 x5"     R quad sets 2x10 x5" 2x10 x5" 2x10 x5" 5" hold, 2x10 2x10 x5" 5" hold, 2x10 2x10 x5" 2x10 x5"     Weight shifts 2x10 2x10           NMES w/ quad sets  5'  5" on /10s off 5', 5" on 10s off          Update TE per protocol NV NV           Biodex Maze   2' 2 min L1  x3 L1  x3 L2  x2 L2  x2     Supine SLR x3   NV 2x10 2x10 ea  2x10  ea 3x10 ea  3x10 ea       Foam balance w/ ball toss     2x10 2x10 2x10 2x10     HS isometric     2x10 x3"  3" hold,   2x10 2x10 x3" 3x10 x3" Standing TKE       Green  2x10 x3" Green  2x10 x3"     Standing heel raises/toe raises        3x10 ea       Ther Activity             Stair and gait training 10'                         Gait Training                                       Modalities

## 2022-06-06 ENCOUNTER — OFFICE VISIT (OUTPATIENT)
Dept: PHYSICAL THERAPY | Facility: CLINIC | Age: 62
End: 2022-06-06
Payer: COMMERCIAL

## 2022-06-06 DIAGNOSIS — S76.111D RUPTURE OF RIGHT QUADRICEPS TENDON, SUBSEQUENT ENCOUNTER: Primary | ICD-10-CM

## 2022-06-06 DIAGNOSIS — M25.561 ACUTE PAIN OF RIGHT KNEE: ICD-10-CM

## 2022-06-06 PROCEDURE — 97110 THERAPEUTIC EXERCISES: CPT | Performed by: PHYSICAL THERAPIST

## 2022-06-06 PROCEDURE — 97140 MANUAL THERAPY 1/> REGIONS: CPT | Performed by: PHYSICAL THERAPIST

## 2022-06-06 NOTE — PROGRESS NOTES
Daily Note     Today's date: 2022  Patient name: Vitor Davila  : 1960  MRN: [id number]  Referring provider: Xuan Little*  Dx:   Encounter Diagnosis     ICD-10-CM    1  Rupture of right quadriceps tendon, subsequent encounter  S76 111D    2  Acute pain of right knee  M25 561                   Subjective: Patient reports HEP compliance and states that he continues to have medial knee discomfort at times but that its minimal   Patient also notes increasing ambulatory routine - getting close to 1/2 mile  Objective: See treatment diary below    R knee PROM: 0-107°    Assessment: Patient demonstrating significant improvement in quad activation including the VMO with minimal quad lag  Knee PROM continues to improve per above  Good form and mechanics remain with all current TE  Plan: Continue per plan of care  Precautions: HTN, Chronic Pain, GERD, s/p right quadriceps tendon repair on 2022 WBAT    Manuals 5/11 5/13 5/16 5/19 5/23 5/26 6/1 6/3 6/6    R knee PROM (0-40° until 2 weeks); progress 35° every 2 weeks: goal of 110° by 6 weeks post-op  10' 10' [facility] 10' EH 10' 5' 5'    Patellar mobilizations  G4 G4 PROM - EH G4 PROM - EH G4 G4 G4    R medial knee STM  5' 5' [facility] 5' [facility] 5' 5' 5'                 Neuro Re-Ed                                                                                                        Ther Ex             R heel slides 2x10 x10" 2x10 x10" 2x10 x5" 5" hold, 2x10 2x10 x5" 5" hold, 2x10 2x10 x5" 2x10 x5" 2x10 x5"    R quad sets 2x10 x5" 2x10 x5" 2x10 x5" 5" hold, 2x10 2x10 x5" 5" hold, 2x10 2x10 x5" 2x10 x5" 2x10 x5"    Weight shifts 2x10 2x10           NMES w/ quad sets  5'  5" on /10s off 5', 5" on 10s off          Update TE per protocol NV NV           Biodex Maze   2' 2 min L1  x3 L1  x3 L2  x2 L2  x2 L3  x2    Supine SLR x3   NV 2x10 2x10 ea  2x10  ea 3x10 ea  3x10 ea   3x10 ea,    Foam balance w/ ball toss     2x10 2x10 2x10 2x10 2x10    HS isometric     2x10 x3"  3" hold,   2x10 2x10 x3" 3x10 x3" 3x10 x3"    Standing TKE       Green  2x10 x3" Green  2x10 x3" Green  2x10 x3"    Standing heel raises/toe raises        3x10 ea  3x10 ea      Ther Activity             Stair and gait training 10'                         Gait Training                                       Modalities

## 2022-06-09 ENCOUNTER — OFFICE VISIT (OUTPATIENT)
Dept: PHYSICAL THERAPY | Facility: CLINIC | Age: 62
End: 2022-06-09
Payer: COMMERCIAL

## 2022-06-09 DIAGNOSIS — M25.561 ACUTE PAIN OF RIGHT KNEE: ICD-10-CM

## 2022-06-09 DIAGNOSIS — S76.111D RUPTURE OF RIGHT QUADRICEPS TENDON, SUBSEQUENT ENCOUNTER: Primary | ICD-10-CM

## 2022-06-09 PROCEDURE — 97110 THERAPEUTIC EXERCISES: CPT

## 2022-06-09 PROCEDURE — 97140 MANUAL THERAPY 1/> REGIONS: CPT

## 2022-06-09 NOTE — PROGRESS NOTES
Daily Note     Today's date: 2022  Patient name: Roberto Beltran  : 1960  MRN: [id number]  Referring provider: Earline Blanchard*  Dx:   Encounter Diagnosis     ICD-10-CM    1  Rupture of right quadriceps tendon, subsequent encounter  S76 111D    2  Acute pain of right knee  M25 561                   Subjective: Patient has worked his way up to one mile once a day  Continues to be compliant with HEP and uses ice throughout the day  Still with occasional medial knee pain  Objective: See treatment diary below  R knee flex measured at 112° on last rep of heel slide  Assessment: See knee flex measurement above  Making good progress at this time with overall improvement in quad inhibition  Per protocol will be able to progress to next phase of protocol next week, plan to progress as able  Plan: Continue per plan of care  Precautions: HTN, Chronic Pain, GERD, s/p right quadriceps tendon repair on 2022 WBAT    Manuals 5/11 5/13 5/16 5/19 5/23 5/26 6/1 6/3 6/6 6/9   R knee PROM (0-40° until 2 weeks); progress 35° every 2 weeks: goal of 110° by 6 weeks post-op  10' 10' [facility] 10' EH 10' 5' 5' [facility]   Patellar mobilizations  G4 G4 PROM - EH G4 PROM - EH G4 G4 G4 PROM - EH   R medial knee STM  5' 5' [facility] 5' [facility] 5' 5' 5' [facility]                Neuro Re-Ed                                                                                                        Ther Ex             R heel slides 2x10 x10" 2x10 x10" 2x10 x5" 5" hold, 2x10 2x10 x5" 5" hold, 2x10 2x10 x5" 2x10 x5" 2x10 x5" 5" hold, 2x10   R quad sets 2x10 x5" 2x10 x5" 2x10 x5" 5" hold, 2x10 2x10 x5" 5" hold, 2x10 2x10 x5" 2x10 x5" 2x10 x5" 5" hold, 2x10   Weight shifts 2x10 2x10           NMES w/ quad sets  5'  5" on /10s off 5', 5" on 10s off          Update TE per protocol NV NV           Biodex Maze   2' 2 min L1  x3 L1  x3 L2  x2 L2  x2 L3  x2 L3  x2   Supine SLR x3   NV 2x10 2x10 ea  2x10  ea 3x10 ea  3x10 ea   3x10 ea, 3x10  ea Foam balance w/ ball toss     2x10 2x10 2x10 2x10 2x10 GMB  3x10   HS isometric     2x10 x3"  3" hold,   2x10 2x10 x3" 3x10 x3" 3x10 x3"  3" hold,   3x10   Standing TKE       Green  2x10 x3" Green  2x10 x3" Green  2x10 x3"  GTB   3" hold,   3x10   Standing heel raises/toe raises        3x10 ea  3x10 ea    3x10   ea                Ther Activity             Stair and gait training 10'                         Gait Training                                       Modalities

## 2022-06-13 ENCOUNTER — OFFICE VISIT (OUTPATIENT)
Dept: PHYSICAL THERAPY | Facility: CLINIC | Age: 62
End: 2022-06-13
Payer: COMMERCIAL

## 2022-06-13 DIAGNOSIS — S76.111D RUPTURE OF RIGHT QUADRICEPS TENDON, SUBSEQUENT ENCOUNTER: Primary | ICD-10-CM

## 2022-06-13 DIAGNOSIS — M25.561 ACUTE PAIN OF RIGHT KNEE: ICD-10-CM

## 2022-06-13 PROCEDURE — 97110 THERAPEUTIC EXERCISES: CPT | Performed by: PHYSICAL THERAPIST

## 2022-06-13 PROCEDURE — 97140 MANUAL THERAPY 1/> REGIONS: CPT | Performed by: PHYSICAL THERAPIST

## 2022-06-13 NOTE — PROGRESS NOTES
Daily Note     Today's date: 2022  Patient name: Malick King  : 1960  MRN: [id number]  Referring provider: Gibson Davidson*  Dx:   Encounter Diagnosis     ICD-10-CM    1  Rupture of right quadriceps tendon, subsequent encounter  S76 111D    2  Acute pain of right knee  M25 561                   Subjective: Patient reports HEP compliance and states that his knee flexion PROM continues to improve  Tenderness noted on the medial compartment of the knee with aches but no pain noted  Patient notes that this decreases with stretching  Objective: See treatment diary below    R knee PROM: 0-117°    Assessment: Right knee PROM continues to improve as noted above  Mild tightness into extension but able to achieve full motion with manuals  Patient continues to demonstrate excellent tolerance to all TE with a mild quadriceps lag       Plan: Continue per plan of care  Perform Re-eval   Open brace to allow flexion with ambulation  Precautions: HTN, Chronic Pain, GERD, s/p right quadriceps tendon repair on 2022 WBAT    Manuals          6   R knee PROM (0-40° until 2 weeks); progress 35° every 2 weeks: goal of 110° by 6 weeks post-op 5'         EH   Patellar mobilizations G4         PROM - EH   R medial knee STM 4'         [address]                Neuro Re-Ed                                                                                                        Ther Ex             R heel slides 2x10 x5"         5" hold, 2x10   R quad sets 2x10 x5"         5" hold, 2x10   Weight shifts             NMES w/ quad sets             Update TE per protocol             Biodex Maze L3  x2         L3  x2   Supine SLR x3 x3 sets to fatigue         3x10  ea   Foam balance w/ ball toss GMB 3x10         GMB  3x10   HS isometric 3x10 x3"          3" hold,   3x10   Standing TKE GTB  3x10 x3"          GTB   3" hold,   3x10   Standing heel raises/toe raises 3x10 ea            3x10   ea                Ther Activity             Stair and gait training                          Gait Training                                       Modalities

## 2022-06-16 ENCOUNTER — EVALUATION (OUTPATIENT)
Dept: PHYSICAL THERAPY | Facility: CLINIC | Age: 62
End: 2022-06-16
Payer: COMMERCIAL

## 2022-06-16 DIAGNOSIS — S76.111D RUPTURE OF RIGHT QUADRICEPS TENDON, SUBSEQUENT ENCOUNTER: Primary | ICD-10-CM

## 2022-06-16 DIAGNOSIS — M25.561 ACUTE PAIN OF RIGHT KNEE: ICD-10-CM

## 2022-06-16 PROCEDURE — 97140 MANUAL THERAPY 1/> REGIONS: CPT | Performed by: PHYSICAL THERAPIST

## 2022-06-16 PROCEDURE — 97110 THERAPEUTIC EXERCISES: CPT | Performed by: PHYSICAL THERAPIST

## 2022-06-16 NOTE — PROGRESS NOTES
PT Re-Evaluation     Today's date: 2022  Patient name: Bhupinder Galindo  : 1960  MRN: [id number]  Referring provider: Dagoberto Lange*  Dx:   Encounter Diagnosis     ICD-10-CM    1  Rupture of right quadriceps tendon, subsequent encounter  S76 111D    2  Acute pain of right knee  M25 561                   Assessment  Assessment details: Patient is a 59y o  year old male who attended physical therapy for 12 treatment sessions regarding s/p right quadriceps tendon repair on 2022   Patient reports 50% improvement at this time which correlates to improved FOTO scoring  Patient has shown improvement throughout PT by demonstrating decreased pain, increased range of motion, increased strength, improved tolerance to activity and improved gait/balance  Patient continues to present with pain, decreased ROM, decreased strength, and decreased tolerance to activity  Megan Chiu would benefit from continued physical therapy to address these issues and to maximize function  Thank you  Impairments: abnormal gait, abnormal or restricted ROM, abnormal movement, activity intolerance, impaired balance, impaired physical strength, lacks appropriate home exercise program, pain with function and weight-bearing intolerance  Understanding of Dx/Px/POC: excellent  Goals  STG (6-8 weeks)  1  Patient will be independent with HEP (MET)  2  Decrease pain at worst by 2 points on NPRS (MET)  3  Increase right knee PROM/AROM to WNL (PROGRESSING)  4  Patient will demonstrate ability to ambulate with normal mechanics and will have weaned from t-scope brace (PROGRESSING)  LTG (16+ weeks)  1  Decrease pain at worst from 4 points on NPRS (PROGRESSING)  2  Patient will demonstrate normal stair mechanics with a reciprocal pattern (PROGRESSING)  3  Increase R = L quad strength for improved ADL and leisure function (PROGRESSING)  4   Patient will demonstrate ability to perform single leg hop test 90% equal to the uninvolved LE (PROGRESSING)  5  Increase FOTO > or equal to expected outcome (PROGRESSING)    Plan  Patient would benefit from: skilled PT  Planned therapy interventions: joint mobilization, manual therapy, patient education, neuromuscular re-education, strengthening, stretching, therapeutic activities, therapeutic exercise, transfer training, home exercise program, functional ROM exercises, balance/weight bearing training and ADL training  Frequency: 1-2x/wk  Duration in weeks: 8  Treatment plan discussed with: patient        Subjective Evaluation    History of Present Illness  Mechanism of injury: Patient presents to outpatient PT s/p right quadriceps tendon repair on 2022  Patient states that the injury occurred when he was draining his pool with a sump pump and tripped when he saw the pump falling into the pool  Since surgery, patient was placed in a t-scope brace locked into extension with and non weight bearing until starting PT  Patient notes that his pain is currently worse with mobility/ambulation and improved with rest, ice and medications  Patient is retired but has difficulty performing his normal ADL's and leisure functions (golf and home renovations) as a result  Patients goals for PT are to decrease the pain, increased strength and improve ROM for return to PLOF      2022: Patient is now 6 weeks post-op and notes no pain or difficulty with general mobility and ADL's  Will begin opening the t-scope brace for ambulation and functional mobility per protocol as patient is demonstrating sufficient quad activation/control and mobility  Patient has been compliant with his HEP throughout  Per patient request, we will begin visits 1x/wk with frequent updates of his HEP secondary to high copay at 8 weeks post-op            Not a recurrent problem   Quality of life: excellent    Pain  Current pain ratin  At best pain ratin  At worst pain ratin  Quality: dull ache, burning and radiating  Relieving factors: rest, relaxation and medications  Aggravating factors: standing and walking  Progression: worsening    Social Support  Steps to enter house: yes  Stairs in house: yes   Lives in: multiple-level home  Lives with: spouse    Employment status: not working  Hand dominance: right      Diagnostic Tests  MRI studies: abnormal  Treatments  Current treatment: immobilization and medication  Patient Goals  Patient goals for therapy: increased strength, independence with ADLs/IADLs, return to sport/leisure activities, increased motion, decreased pain, improved balance and decreased edema          Objective     Observations     Right Knee   Negative for edema  Additional Observation Details  Low risk for DVT per Wells criteria    (-) pitting edema    - Incision is clean, dry and without exudate    Neurological Testing     Sensation     Knee     Right Knee   Intact: light touch     Active Range of Motion     Right Knee   Flexion: 115 degrees   Extension: 0 degrees     Passive Range of Motion     Right Knee   Flexion: 120 degrees   Extension: 0 degrees     Mobility   Patellar Mobility:     Right Knee   Hypomobile: medial, lateral, superior and inferior     Strength/Myotome Testing     Right Knee   Quadriceps contraction: good    Additional Strength Details  *MMT deferred per post-operative restrictions    Ambulation     Ambulation: Level Surfaces     Additional Level Surfaces Ambulation Details  Patient using 4 pt walker with non weight bearing of the right LE prior to evaluation  Following gait training, patient able to demonstrate improved weight bearing of the right LE with a step to pattern using the 4pt walker        6/16/2022: No AD with t-scope brace opened to 45° of knee flexion and equal step/stride length noted      Ambulation: Stairs     Additional Stairs Ambulation Details  Up with the left LE, down with the right LE with B/L rails             Precautions: HTN, Chronic Pain, GERD, s/p right quadriceps tendon repair on 05/05/2022 WBAT    Manuals 6/13 6/16        6/9   R knee PROM to tolerance (brace now open to 45°, 90° week 7, out by week 8) 5' 5'        [facility]   Patellar mobilizations G4 G4        PROM - EH   R medial knee STM 4' 4'        [facility]   Re-eval  10'           Neuro Re-Ed             SLS  4x30"           SLS hip abduction  3x10 B/L                                                                            Ther Ex             R heel slides 2x10 x5" 2x10 x5"        5" hold, 2x10   Biodex Maze L3  x2 L3  x2        L3  x2   Supine SLR x3 - no brace x3 sets to fatigue x3 sets to fatigue        3x10  ea   Foam balance w/ ball toss GMB 3x10 GMB  3x10        GMB  3x10   HS isometric 3x10 x3" 3x10 x3"         3" hold,   3x10   Standing heel raises/toe raises 3x10 ea  3x10  ea           3x10   ea   Recumbent bike  L1  5'           Monster walks  NV           Mini squats  3x10           Leg press (45-0° of knee flexion)  45#  3x15 Single leg          4" stairs  NV                                                  Ther Activity             Stair and gait training                          Gait Training                                       Modalities

## 2022-06-16 NOTE — LETTER
2022    Jeancarlos Love MD  [address] [place] [address]    Patient: Mercy Aceves   YOB: 1960   Date of Visit: 2022     Encounter Diagnosis     ICD-10-CM    1  Rupture of right quadriceps tendon, subsequent encounter  S76 111D    2  Acute pain of right knee  M25 561        Dear Dr Hany Olivo:    Thank you for your recent referral of Mercy Aceves  Please review the attached evaluation summary from Kennedy's recent visit  Please verify that you agree with the plan of care by signing the attached order  If you have any questions or concerns, please do not hesitate to call  I sincerely appreciate the opportunity to share in the care of one of your patients and hope to have another opportunity to work with you in the near future  Sincerely,    Kody Ahuja, PT      Referring Provider:      I certify that I have read the below Plan of Care and certify the need for these services furnished under this plan of treatment while under my care  Jeancarlos Love MD  [address] [place] [address]  Via Fax: [phone]          PT Re-Evaluation     Today's date: 2022  Patient name: Mercy Acvees  : 1960  MRN: [id number]  Referring provider: Niall Reed*  Dx:   Encounter Diagnosis     ICD-10-CM    1  Rupture of right quadriceps tendon, subsequent encounter  S76 111D    2  Acute pain of right knee  M25 561                   Assessment  Assessment details: Patient is a 59y o  year old male who attended physical therapy for 12 treatment sessions regarding s/p right quadriceps tendon repair on 2022   Patient reports 50% improvement at this time which correlates to improved FOTO scoring  Patient has shown improvement throughout PT by demonstrating decreased pain, increased range of motion, increased strength, improved tolerance to activity and improved gait/balance    Patient continues to present with pain, decreased ROM, decreased strength, and decreased tolerance to activity  Lyudmila Graft would benefit from continued physical therapy to address these issues and to maximize function  Thank you  Impairments: abnormal gait, abnormal or restricted ROM, abnormal movement, activity intolerance, impaired balance, impaired physical strength, lacks appropriate home exercise program, pain with function and weight-bearing intolerance  Understanding of Dx/Px/POC: excellent  Goals  STG (6-8 weeks)  1  Patient will be independent with HEP (MET)  2  Decrease pain at worst by 2 points on NPRS (MET)  3  Increase right knee PROM/AROM to WNL (PROGRESSING)  4  Patient will demonstrate ability to ambulate with normal mechanics and will have weaned from t-scope brace (PROGRESSING)  LTG (16+ weeks)  1  Decrease pain at worst from 4 points on NPRS (PROGRESSING)  2  Patient will demonstrate normal stair mechanics with a reciprocal pattern (PROGRESSING)  3  Increase R = L quad strength for improved ADL and leisure function (PROGRESSING)  4  Patient will demonstrate ability to perform single leg hop test 90% equal to the uninvolved LE (PROGRESSING)  5  Increase FOTO > or equal to expected outcome (PROGRESSING)    Plan  Patient would benefit from: skilled PT  Planned therapy interventions: joint mobilization, manual therapy, patient education, neuromuscular re-education, strengthening, stretching, therapeutic activities, therapeutic exercise, transfer training, home exercise program, functional ROM exercises, balance/weight bearing training and ADL training  Frequency: 1-2x/wk  Duration in weeks: 8  Treatment plan discussed with: patient        Subjective Evaluation    History of Present Illness  Mechanism of injury: Patient presents to outpatient PT s/p right quadriceps tendon repair on 05/05/2022  Patient states that the injury occurred when he was draining his pool with a sump pump and tripped when he saw the pump falling into the pool  Since surgery, patient was placed in a t-scope brace locked into extension with and non weight bearing until starting PT  Patient notes that his pain is currently worse with mobility/ambulation and improved with rest, ice and medications  Patient is retired but has difficulty performing his normal ADL's and leisure functions (golf and home renovations) as a result  Patients goals for PT are to decrease the pain, increased strength and improve ROM for return to PLOF      2022: Patient is now 6 weeks post-op and notes no pain or difficulty with general mobility and ADL's  Will begin opening the t-scope brace for ambulation and functional mobility per protocol as patient is demonstrating sufficient quad activation/control and mobility  Patient has been compliant with his HEP throughout  Per patient request, we will begin visits 1x/wk with frequent updates of his HEP secondary to high copay at 8 weeks post-op  Not a recurrent problem   Quality of life: excellent    Pain  Current pain ratin  At best pain ratin  At worst pain ratin  Quality: dull ache, burning and radiating  Relieving factors: rest, relaxation and medications  Aggravating factors: standing and walking  Progression: worsening    Social Support  Steps to enter house: yes  Stairs in house: yes   Lives in: multiple-level home  Lives with: spouse    Employment status: not working  Hand dominance: right      Diagnostic Tests  MRI studies: abnormal  Treatments  Current treatment: immobilization and medication  Patient Goals  Patient goals for therapy: increased strength, independence with ADLs/IADLs, return to sport/leisure activities, increased motion, decreased pain, improved balance and decreased edema          Objective     Observations     Right Knee   Negative for edema       Additional Observation Details  Low risk for DVT per Wells criteria    (-) pitting edema    - Incision is clean, dry and without exudate    Neurological Testing     Sensation     Knee     Right Knee   Intact: light touch     Active Range of Motion     Right Knee   Flexion: 115 degrees   Extension: 0 degrees     Passive Range of Motion     Right Knee   Flexion: 120 degrees   Extension: 0 degrees     Mobility   Patellar Mobility:     Right Knee   Hypomobile: medial, lateral, superior and inferior     Strength/Myotome Testing     Right Knee   Quadriceps contraction: good    Additional Strength Details  *MMT deferred per post-operative restrictions    Ambulation     Ambulation: Level Surfaces     Additional Level Surfaces Ambulation Details  Patient using 4 pt walker with non weight bearing of the right LE prior to evaluation  Following gait training, patient able to demonstrate improved weight bearing of the right LE with a step to pattern using the 4pt walker  6/16/2022: No AD with t-scope brace opened to 45° of knee flexion and equal step/stride length noted      Ambulation: Stairs     Additional Stairs Ambulation Details  Up with the left LE, down with the right LE with B/L rails             Precautions: HTN, Chronic Pain, GERD, s/p right quadriceps tendon repair on 05/05/2022 WBAT    Manuals 6/13 6/16        6/9   R knee PROM to tolerance (brace now open to 45°, 90° week 7, out by week 8) 5' 5'        [facility]   Patellar mobilizations G4 G4        PROM - EH   R medial knee STM 4' 4'        [facility]   Re-eval  10'           Neuro Re-Ed             SLS  4x30"           SLS hip abduction  3x10 B/L                                                                            Ther Ex             R heel slides 2x10 x5" 2x10 x5"        5" hold, 2x10   Biodex Maze L3  x2 L3  x2        L3  x2   Supine SLR x3 - no brace x3 sets to fatigue x3 sets to fatigue        3x10  ea   Foam balance w/ ball toss GMB 3x10 GMB  3x10        GMB  3x10   HS isometric 3x10 x3" 3x10 x3"         3" hold,   3x10   Standing heel raises/toe raises 3x10 ea  3x10  ea           3x10 ea   Recumbent bike  L1  5'           Monster walks  NV           Mini squats  3x10           Leg press (45-0° of knee flexion)  45#  3x15 Single leg          4" stairs  NV                                                  Ther Activity             Stair and gait training                          Gait Training                                       Modalities

## 2022-06-20 ENCOUNTER — OFFICE VISIT (OUTPATIENT)
Dept: PHYSICAL THERAPY | Facility: CLINIC | Age: 62
End: 2022-06-20
Payer: COMMERCIAL

## 2022-06-20 DIAGNOSIS — S76.111D RUPTURE OF RIGHT QUADRICEPS TENDON, SUBSEQUENT ENCOUNTER: Primary | ICD-10-CM

## 2022-06-20 DIAGNOSIS — M25.561 ACUTE PAIN OF RIGHT KNEE: ICD-10-CM

## 2022-06-20 PROCEDURE — 97140 MANUAL THERAPY 1/> REGIONS: CPT

## 2022-06-20 PROCEDURE — 97110 THERAPEUTIC EXERCISES: CPT

## 2022-06-23 ENCOUNTER — OFFICE VISIT (OUTPATIENT)
Dept: PHYSICAL THERAPY | Facility: CLINIC | Age: 62
End: 2022-06-23
Payer: COMMERCIAL

## 2022-06-23 DIAGNOSIS — S76.111D RUPTURE OF RIGHT QUADRICEPS TENDON, SUBSEQUENT ENCOUNTER: Primary | ICD-10-CM

## 2022-06-23 DIAGNOSIS — M25.561 ACUTE PAIN OF RIGHT KNEE: ICD-10-CM

## 2022-06-23 PROCEDURE — 97140 MANUAL THERAPY 1/> REGIONS: CPT

## 2022-06-23 PROCEDURE — 97110 THERAPEUTIC EXERCISES: CPT

## 2022-06-23 PROCEDURE — 97112 NEUROMUSCULAR REEDUCATION: CPT

## 2022-06-23 NOTE — PROGRESS NOTES
Daily Note     Today's date: 2022  Patient name: Andrea Paulino  : 1960  MRN: [id number]  Referring provider: Buzz Goldmann*  Dx:   Encounter Diagnosis     ICD-10-CM    1  Rupture of right quadriceps tendon, subsequent encounter  S76 111D    2  Acute pain of right knee  M25 561                   Subjective: Patient reported continued compliance with HEP  Feels his strength is improving  Taylor Ast yet hesitant to discharge brace  Objective: See treatment diary below  R knee flex measured at 125° on last rep of heel slide  Assessment: Now 7 weeks post-op  Brace unlocked today to 90°  Overall demonstrating more sufficient quad control and strength  In one week, patient will be able to ween from brace and begin more open chained exercises  Plan: Continue per plan of care  Progress treatment as tolerated  Per patient request, we will begin visits 1x/wk with frequent updates of his HEP secondary to high copay at 8 weeks post-op           Precautions: HTN, Chronic Pain, GERD, s/p right quadriceps tendon repair on 2022 WBAT    Manuals    R knee PROM to tolerance (brace now open to 60°, 90° week 7, out by week 8) 5' 5' [facility]   Patellar mobilizations G4 G4 PROM - EH PROM - EH      PROM - EH   R medial knee STM 4' 4' [facility]      EH   Re-eval  10'                        Neuro Re-Ed             SLS  4x30" 30"x4 30"x4         SLS hip abduction  3x10 B/L 3x10  bilat 3x10  bilat                                                                          Ther Ex             R heel slides 2x10 x5" 2x10 x5" 5" hold, 2x10 5" hold, 2x10      5" hold, 2x10   Biodex Maze L3  x2 L3  x2 L3  x2 L3  x2      L3  x2   Supine SLR x3 - no brace x3 sets to fatigue x3 sets to fatigue x3 sets to fatigue x3 sets to fatigue      3x10  ea   Foam balance w/ ball toss GMB 3x10 GMB  3x10 RMB  3x10 RMB  3x10      GMB  3x10   HS isometric 3x10 x3" 3x10 x3"  3"  hold,    3x10  3" hold,    3x10       3" hold,   3x10   Standing heel raises/toe raises 3x10 ea  3x10  ea    2x20   ea  2x20   ea       3x10   ea   Recumbent bike  L1  5'  5 min   L1  5 min   L1         Monster walks  NV  NV  25 ft   x4         Mini squats  3x10  3x10  3x10         Leg press (45-0° of knee flexion)  45#  3x15  Single leg   45#   3x10  Single leg   45#   3x10         4" stairs  NV  NV  2x10                                                Ther Activity             Stair and gait training                          Gait Training                                       Modalities

## 2022-06-27 ENCOUNTER — OFFICE VISIT (OUTPATIENT)
Dept: PHYSICAL THERAPY | Facility: CLINIC | Age: 62
End: 2022-06-27
Payer: COMMERCIAL

## 2022-06-27 DIAGNOSIS — M25.561 ACUTE PAIN OF RIGHT KNEE: ICD-10-CM

## 2022-06-27 DIAGNOSIS — S76.111D RUPTURE OF RIGHT QUADRICEPS TENDON, SUBSEQUENT ENCOUNTER: Primary | ICD-10-CM

## 2022-06-27 PROCEDURE — 97110 THERAPEUTIC EXERCISES: CPT | Performed by: PHYSICAL THERAPIST

## 2022-06-27 PROCEDURE — 97112 NEUROMUSCULAR REEDUCATION: CPT | Performed by: PHYSICAL THERAPIST

## 2022-06-27 PROCEDURE — 97140 MANUAL THERAPY 1/> REGIONS: CPT | Performed by: PHYSICAL THERAPIST

## 2022-06-27 NOTE — PROGRESS NOTES
Daily Note     Today's date: 2022  Patient name: Vitor Davila  : 1960  MRN: [id number]  Referring provider: Kenzie Hernandes*  Dx:   Encounter Diagnosis     ICD-10-CM    1  Rupture of right quadriceps tendon, subsequent encounter  S76 111D    2  Acute pain of right knee  M25 561                   Subjective: Patient reports that he maintains HEP compliance  No difficulty noted with brace opened to 90°  Objective: See treatment diary below  R knee flex measured at 128° on last rep of heel slide  Assessment: Patient demonstrates good quadriceps control with opened brace and will be prepared for d/c of brace at 8 weeks  Fatigue noted with current progressions but patient tolerating very well overall  Patients PROM improving appropriately  Educated patient to maintain shortened arc of motion with current quad strengthening exercises when at the gym  Plan: Continue per plan of care  Progress treatment as tolerated  Per patient request, we will begin visits 1x/wk with frequent updates of his HEP secondary to high copay at 8 weeks post-op           Precautions: HTN, Chronic Pain, GERD, s/p right quadriceps tendon repair on 2022 WBAT    Manuals      6   R knee PROM to tolerance (brace now open to 60°, 90° week 7, out by week 8) 5' 5' [facility] 5'     [address]   Patellar mobilizations G4 G4 PROM - EH PROM - EH G4     PROM - EH   R medial knee STM 4' 4' [address] [facility] [address] [facility]      EH   Re-eval  10'                        Neuro Re-Ed             SLS  4x30" 30"x4 30"x4 4x30"        SLS hip abduction  3x10 B/L 3x10  bilat 3x10  bilat 3x10 B/L                                                                         Ther Ex             R heel slides 2x10 x5" 2x10 x5" 5" hold, 2x10 5" hold, 2x10 2x10 x5"     5" hold, 2x10   Biodex Maze L3  x2 L3  x2 L3  x2 L3  x2 L3  x2     L3  x2   Supine SLR x3 - no brace x3 sets to fatigue x3 sets to fatigue x3 sets to fatigue x3 sets to fatigue x3 sets to fatigue     3x10  ea   Foam balance w/ ball toss GMB 3x10 GMB  3x10 RMB  3x10 RMB  3x10 RMB  3x10     GMB  3x10   HS isometric 3x10 x3" 3x10 x3"  3"  hold,    3x10  3" hold,    3x10 3x10 x3"      3" hold,   3x10   Standing heel raises/toe raises 3x10 ea  3x10  ea  2x20   ea  2x20   ea 2x20 ea        3x10   ea   Recumbent bike  L1  5'  5 min   L1  5 min   L1 L1  5'        Monster walks  NV  NV  25 ft   x4 25 ft  x4        Mini squats  3x10  3x10  3x10 3x10        Leg press (45-0° of knee flexion)  45#  3x15  Single leg   45#   3x10  Single leg   45#   3x10 Single leg 45#  3x10        Step ups  NV  NV  2x10 2x10 6"                                               Ther Activity             Stair and gait training                          Gait Training                                       Modalities

## 2022-06-30 ENCOUNTER — OFFICE VISIT (OUTPATIENT)
Dept: PHYSICAL THERAPY | Facility: CLINIC | Age: 62
End: 2022-06-30
Payer: COMMERCIAL

## 2022-06-30 DIAGNOSIS — M25.561 ACUTE PAIN OF RIGHT KNEE: ICD-10-CM

## 2022-06-30 DIAGNOSIS — S76.111D RUPTURE OF RIGHT QUADRICEPS TENDON, SUBSEQUENT ENCOUNTER: Primary | ICD-10-CM

## 2022-06-30 PROCEDURE — 97112 NEUROMUSCULAR REEDUCATION: CPT

## 2022-06-30 PROCEDURE — 97140 MANUAL THERAPY 1/> REGIONS: CPT

## 2022-06-30 PROCEDURE — 97110 THERAPEUTIC EXERCISES: CPT

## 2022-06-30 NOTE — PROGRESS NOTES
Daily Note     Today's date: 2022  Patient name: Devin Bhatti  : 1960  MRN: [id number]  Referring provider: Ezell Gaucher*  Dx:   Encounter Diagnosis     ICD-10-CM    1  Rupture of right quadriceps tendon, subsequent encounter  S76 111D    2  Acute pain of right knee  M25 561                   Subjective: Patient has been doing a lot of work around the house and installing fence on pool with no pain in R knee with brace still unlocked to 90°  Remains compliant with HEP and gym routine  Objective: See treatment diary below  R knee flex measured at 130° on last rep of heel slide  Assessment: 8 weeks post-op  Progressing with quad strength having good control throughout program  Per protocol patient is able to discontinue use of brace  Encouraged discontinuing use of brace gradually, continuing to wear if working outside  Plan: Continue per plan of care  Progress treatment as tolerated  Per patient request, we will begin visits 1x/wk with frequent updates of his HEP secondary to high copay at 8 weeks post-op           Precautions: HTN, Chronic Pain, GERD, s/p right quadriceps tendon repair on 2022 WBAT    Manuals     6   R knee PROM to tolerance (brace now open to 60°, 90° week 7, out by week 8) 5' 5' [facility] EH 5' [facility]    EH   Patellar mobilizations G4 G4 PROM - EH PROM - EH G4 PROM - EH    PROM - EH   R medial knee STM 4' 4' [facility]      EH   Re-eval  10'                        Neuro Re-Ed             SLS  4x30" 30"x4 30"x4 4x30" 30"x3       SLS hip abduction  3x10 B/L 3x10  bilat 3x10  bilat 3x10 B/L 3x10  bilat                                                                        Ther Ex             R heel slides 2x10 x5" 2x10 x5" 5" hold, 2x10 5" hold, 2x10 2x10 x5" 5" hold, 2x10    5" hold, 2x10   Biodex Maze L3  x2 L3  x2 L3  x2 L3  x2 L3  x2 L3  x2    L3  x2   Supine SLR x3 - no brace x3 sets to fatigue x3 sets to fatigue x3 sets to fatigue x3 sets to fatigue x3 sets to fatigue x3 sets to fatigue    3x10  ea   Foam balance w/ ball toss GMB 3x10 GMB  3x10 RMB  3x10 RMB  3x10 RMB  3x10 RMB  3x10    GMB  3x10   HS isometric 3x10 x3" 3x10 x3"  3"  hold,    3x10  3" hold,    3x10 3x10 x3"      3" hold,   3x10   Standing heel raises/toe raises 3x10 ea  3x10  ea  2x20   ea  2x20   ea 2x20 ea    2x20   ea     3x10   ea   Recumbent bike  L1  5'  5 min   L1  5 min   L1 L1  5'  5 min   L1       Monster walks  NV  NV  25 ft   x4 25 ft  x4  25 ft   x4       Mini squats  3x10  3x10  3x10 3x10  3x10       Leg press (45-0° of knee flexion)  45#  3x15  Single leg   45#   3x10  Single leg   45#   3x10 Single leg 45#  3x10  Single leg   45#   3x10       Step ups  NV  NV  2x10 2x10 6"  6"   2x10                                              Ther Activity             Stair and gait training                          Gait Training                                       Modalities

## 2022-07-07 ENCOUNTER — OFFICE VISIT (OUTPATIENT)
Dept: PHYSICAL THERAPY | Facility: CLINIC | Age: 62
End: 2022-07-07
Payer: COMMERCIAL

## 2022-07-07 DIAGNOSIS — M25.561 ACUTE PAIN OF RIGHT KNEE: ICD-10-CM

## 2022-07-07 DIAGNOSIS — S76.111D RUPTURE OF RIGHT QUADRICEPS TENDON, SUBSEQUENT ENCOUNTER: Primary | ICD-10-CM

## 2022-07-07 PROCEDURE — 97110 THERAPEUTIC EXERCISES: CPT | Performed by: PHYSICAL THERAPIST

## 2022-07-07 PROCEDURE — 97112 NEUROMUSCULAR REEDUCATION: CPT | Performed by: PHYSICAL THERAPIST

## 2022-07-07 PROCEDURE — 97140 MANUAL THERAPY 1/> REGIONS: CPT | Performed by: PHYSICAL THERAPIST

## 2022-07-07 NOTE — PROGRESS NOTES
Daily Note     Today's date: 2022  Patient name: Dale Hutchinson  : 1960  MRN: [id number]  Referring provider: Isiah Rice*  Dx:   Encounter Diagnosis     ICD-10-CM    1  Rupture of right quadriceps tendon, subsequent encounter  S76 111D    2  Acute pain of right knee  M25 561                   Subjective: Patient reports HEP compliance and notes that overall he is doing very well  Notes some soreness as he continues to ramp up daily living  Objective: See treatment diary below      Assessment: Moderate suprapatellar pouch swelling remains but overall this is minimally effecting his ROM  Progressed TE below via squat depth and single leg focus and patient tolerated well  Updated HEP to include gentle quad stretching  Plan: Continue per plan of care        Precautions: HTN, Chronic Pain, GERD, s/p right quadriceps tendon repair on 2022 WBAT    Manuals    R knee PROM to tolerance (brace now open to 60°, 90° week 7, out by week 8) 5' 5' [facility] EH 5' [facility] 5'   [facility]   Patellar mobilizations G4 G4 PROM - EH PROM - EH G4 PROM - EH G4   PROM - EH   R medial knee STM 4' 4' [facility]      EH   Re-eval  10'           Gentle quad stretching       4x30"      Neuro Re-Ed             SLS on foam  4x30" 30"x4 30"x4 4x30" 30"x3 3x30"      SLS hip abduction  3x10 B/L 3x10  bilat 3x10  bilat 3x10 B/L 3x10  bilat 3x10 B/L      Y balance       x5                                                          Ther Ex             R heel slides 2x10 x5" 2x10 x5" 5" hold, 2x10 5" hold, 2x10 2x10 x5" 5" hold, 2x10    5" hold, 2x10   Biodex Maze L3  x2 L3  x2 L3  x2 L3  x2 L3  x2 L3  x2    L3  x2   Supine SLR x3 - no brace x3 sets to fatigue x3 sets to fatigue x3 sets to fatigue x3 sets to fatigue x3 sets to fatigue x3 sets to fatigue    3x10  ea   Foam balance w/ ball toss GMB 3x10 GMB  3x10 RMB  3x10 RMB  3x10 RMB  3x10 RMB  3x10    GMB  3x10   HS isometric 3x10 x3" 3x10 x3"  3"  hold,    3x10  3" hold,    3x10 3x10 x3"      3" hold,   3x10   Standing heel raises/toe raises 3x10 ea  3x10  ea  2x20   ea  2x20   ea 2x20 ea    2x20   ea     3x10   ea   Recumbent bike  L1  5'  5 min   L1  5 min   L1 L1  5'  5 min   L1 L1  5'      Elliptical        L1  5'      Monster walks  NV  NV  25 ft   x4 25 ft  x4  25 ft   x4 25 ft x3 OTB      Mini squats  3x10  3x10  3x10 3x10  3x10 3x10 to low mat      Leg press (45-0° of knee flexion)  45#  3x15  Single leg   45#   3x10  Single leg   45#   3x10 Single leg 45#  3x10  Single leg   45#   3x10 55#  3x10      Step ups  NV  NV  2x10 2x10 6"  6"   2x10       Lateral step down       4"  3x10      Pball HS curl in supine       3x10                   Ther Activity             Stair and gait training                          Gait Training                                       Modalities

## 2022-07-14 ENCOUNTER — OFFICE VISIT (OUTPATIENT)
Dept: PHYSICAL THERAPY | Facility: CLINIC | Age: 62
End: 2022-07-14
Payer: COMMERCIAL

## 2022-07-14 DIAGNOSIS — S76.111D RUPTURE OF RIGHT QUADRICEPS TENDON, SUBSEQUENT ENCOUNTER: Primary | ICD-10-CM

## 2022-07-14 DIAGNOSIS — M25.561 ACUTE PAIN OF RIGHT KNEE: ICD-10-CM

## 2022-07-14 PROCEDURE — 97140 MANUAL THERAPY 1/> REGIONS: CPT | Performed by: PHYSICAL THERAPIST

## 2022-07-14 PROCEDURE — 97110 THERAPEUTIC EXERCISES: CPT | Performed by: PHYSICAL THERAPIST

## 2022-07-14 PROCEDURE — 97112 NEUROMUSCULAR REEDUCATION: CPT | Performed by: PHYSICAL THERAPIST

## 2022-07-14 NOTE — PROGRESS NOTES
Daily Note     Today's date: 2022  Patient name: Magda Willett  : 1960  MRN: [id number]  Referring provider: Humza Guadalupe*  Dx:   Encounter Diagnosis     ICD-10-CM    1  Rupture of right quadriceps tendon, subsequent encounter  S76 111D    2  Acute pain of right knee  M25 561                   Subjective: Patient reports HEP compliance and states that overall his knee is doing well  Notes progress with strengthening at the gym  Objective: See treatment diary below      Assessment: Patient demonstrates significant improvement in quad length but tightness remains present  Greatest difficulty noted with neuromuscular progressions with quad activation including BOSU squats  Appropriate control noted in the absence of dynamic movement  Overall patient continues to progress very well  Plan: Continue per plan of care        Precautions: HTN, Chronic Pain, GERD, s/p right quadriceps tendon repair on 2022 WBAT    Manuals   6   R knee PROM to tolerance (brace now open to 60°, 90° week 7, out by week 8) 5' 5' [address] [facility] EH 5' 140[facility] 5' 5'  [address] [facility]   Patellar mobilizations G4 G4 PROM - EH PROM - EH G4 PROM - EH G4 G4  PROM - EH   R medial knee STM 4' 4' 140[address] [facility] 1404 [facility]      EH   Re-eval  10'           Gentle quad stretching       4x30" 4x30"     Neuro Re-Ed             SLS on foam  4x30" 30"x4 30"x4 4x30" 30"x3 3x30" 3x30"     SLS hip abduction  3x10 B/L 3x10  bilat 3x10  bilat 3x10 B/L 3x10  bilat 3x10 B/L 3x10 B/L     Y balance       x5 x5     BOSU ball mini squats        2x6                                            Ther Ex             R heel slides 2x10 x5" 2x10 x5" 5" hold, 2x10 5" hold, 2x10 2x10 x5" 5" hold, 2x10    5" hold, 2x10   Biodex Maze L3  x2 L3  x2 L3  x2 L3  x2 L3  x2 L3  x2    L3  x2   Supine SLR x3 - no brace x3 sets to fatigue x3 sets to fatigue x3 sets to fatigue x3 sets to fatigue x3 sets to fatigue x3 sets to fatigue    3x10  ea   Foam balance w/ ball toss GMB 3x10 GMB  3x10 RMB  3x10 RMB  3x10 RMB  3x10 RMB  3x10    GMB  3x10   HS isometric 3x10 x3" 3x10 x3"  3"  hold,    3x10  3" hold,    3x10 3x10 x3"      3" hold,   3x10   Standing heel raises/toe raises 3x10 ea  3x10  ea  2x20   ea  2x20   ea 2x20 ea    2x20   ea     3x10   ea   Recumbent bike  L1  5'  5 min   L1  5 min   L1 L1  5'  5 min   L1 L1  5' L1  5'     Elliptical        L1  5' L1  5'     Monster walks  NV  NV  25 ft   x4 25 ft  x4  25 ft   x4 25 ft x3 OTB      Mini squats  3x10  3x10  3x10 3x10  3x10 3x10 to low mat      Leg press (45-0° of knee flexion)  45#  3x15  Single leg   45#   3x10  Single leg   45#   3x10 Single leg 45#  3x10  Single leg   45#   3x10 55#  3x10      Step ups  NV  NV  2x10 2x10 6"  6"   2x10       Lateral step down       4"  3x10 4"   3x10     Pball HS curl in supine       3x10      Side steps on step        4x30"     Ther Activity             Stair and gait training                          Gait Training                                       Modalities

## 2022-07-18 DIAGNOSIS — G89.29 CHRONIC PAIN OF BOTH SHOULDERS: ICD-10-CM

## 2022-07-18 DIAGNOSIS — M25.512 CHRONIC PAIN OF BOTH SHOULDERS: ICD-10-CM

## 2022-07-18 DIAGNOSIS — M25.511 CHRONIC PAIN OF BOTH SHOULDERS: ICD-10-CM

## 2022-07-18 RX ORDER — CYCLOBENZAPRINE HCL 10 MG
10 TABLET ORAL 3 TIMES DAILY PRN
Qty: 30 TABLET | Refills: 0 | Status: SHIPPED | OUTPATIENT
Start: 2022-07-18 | End: 2022-10-22 | Stop reason: SDUPTHER

## 2022-07-21 ENCOUNTER — OFFICE VISIT (OUTPATIENT)
Dept: PHYSICAL THERAPY | Facility: CLINIC | Age: 62
End: 2022-07-21
Payer: COMMERCIAL

## 2022-07-21 DIAGNOSIS — S76.111D RUPTURE OF RIGHT QUADRICEPS TENDON, SUBSEQUENT ENCOUNTER: Primary | ICD-10-CM

## 2022-07-21 PROCEDURE — 97112 NEUROMUSCULAR REEDUCATION: CPT | Performed by: PHYSICAL THERAPIST

## 2022-07-21 PROCEDURE — 97110 THERAPEUTIC EXERCISES: CPT | Performed by: PHYSICAL THERAPIST

## 2022-07-21 PROCEDURE — 97140 MANUAL THERAPY 1/> REGIONS: CPT | Performed by: PHYSICAL THERAPIST

## 2022-07-21 NOTE — PROGRESS NOTES
Daily Note     Today's date: 2022  Patient name: Winnie Ritchie  : 1960  MRN: [id number]  Referring provider: Sharmila Hwang*  Dx:   Encounter Diagnosis     ICD-10-CM    1  Rupture of right quadriceps tendon, subsequent encounter  S76 850D                   Subjective: Patient reports HEP compliance and states that his knee is doing well overall but notes some soreness as he "does too much" around the house and states that most of the discomfort is at night  Objective: See treatment diary below      Assessment: Gait remains mildly antalgic with a decreased terminal knee extension  Educated patient on these mechanics for a greater frequency of corrective pattern  Improved quad activation and control noted with y balance per improved ROM and stability with knee flexion  Plan: Continue per plan of care        Precautions: HTN, Chronic Pain, GERD, s/p right quadriceps tendon repair on 2022 WBAT    Manuals     R knee PROM to tolerance (brace now open to 60°, 90° week 7, out by week 8) 5' 5' [facility] EH 5' [facility] 5' 5' 5'    Patellar mobilizations G4 G4 PROM - EH PROM - EH G4 PROM - EH G4 G4 G4    R medial knee STM 4' 4' [facility] EH         Re-eval  10'           Gentle quad stretching       4x30" 4x30" 4x30"    Neuro Re-Ed             SLS on foam  4x30" 30"x4 30"x4 4x30" 30"x3 3x30" 3x30" 3x30"    SLS hip abduction  3x10 B/L 3x10  bilat 3x10  bilat 3x10 B/L 3x10  bilat 3x10 B/L 3x10 B/L 3x10 B/L    Y balance       x5 x5 To fatigue    BOSU ball mini squats        2x6 2x10    RDL         5#  2x10                              Ther Ex             R heel slides 2x10 x5" 2x10 x5" 5" hold, 2x10 5" hold, 2x10 2x10 x5" 5" hold, 2x10       Biodex Maze L3  x2 L3  x2 L3  x2 L3  x2 L3  x2 L3  x2       Supine SLR x3 - no brace x3 sets to fatigue x3 sets to fatigue x3 sets to fatigue x3 sets to fatigue x3 sets to fatigue x3 sets to fatigue       Foam balance w/ ball toss GMB 3x10 GMB  3x10 RMB  3x10 RMB  3x10 RMB  3x10 RMB  3x10       HS isometric 3x10 x3" 3x10 x3"  3"  hold,    3x10  3" hold,    3x10 3x10 x3"        Standing heel raises/toe raises 3x10 ea  3x10  ea  2x20   ea  2x20   ea 2x20 ea    2x20   ea       Recumbent bike  L1  5'  5 min   L1  5 min   L1 L1  5'  5 min   L1 L1  5' L1  5' L1  5'    Elliptical        L1  5' L1  5' L1  5'    Monster walks  NV  NV  25 ft   x4 25 ft  x4  25 ft   x4 25 ft x3 OTB      Mini squats  3x10  3x10  3x10 3x10  3x10 3x10 to low mat      Leg press (45-0° of knee flexion)  45#  3x15  Single leg   45#   3x10  Single leg   45#   3x10 Single leg 45#  3x10  Single leg   45#   3x10 55#  3x10      Step ups  NV  NV  2x10 2x10 6"  6"   2x10       Lateral step down       4"  3x10 4"   3x10 4"  3x10    Pball HS curl in supine       3x10      Side steps on step        4x30" 3x10    Ther Activity             Stair and gait training                          Gait Training                                       Modalities

## 2022-07-28 ENCOUNTER — OFFICE VISIT (OUTPATIENT)
Dept: PHYSICAL THERAPY | Facility: CLINIC | Age: 62
End: 2022-07-28
Payer: COMMERCIAL

## 2022-07-28 DIAGNOSIS — M25.561 ACUTE PAIN OF RIGHT KNEE: ICD-10-CM

## 2022-07-28 DIAGNOSIS — S76.111D RUPTURE OF RIGHT QUADRICEPS TENDON, SUBSEQUENT ENCOUNTER: Primary | ICD-10-CM

## 2022-07-28 PROCEDURE — 97140 MANUAL THERAPY 1/> REGIONS: CPT | Performed by: PHYSICAL THERAPIST

## 2022-07-28 PROCEDURE — 97110 THERAPEUTIC EXERCISES: CPT | Performed by: PHYSICAL THERAPIST

## 2022-07-28 NOTE — PROGRESS NOTES
PT Re-Evaluation  and PT Discharge    Today's date: 2022  Patient name: Brendon Suarez  : 1960  MRN: [id number]  Referring provider: Ariadne Osorio  Dx:   Encounter Diagnosis     ICD-10-CM    1  Rupture of right quadriceps tendon, subsequent encounter  S76 111D    2  Acute pain of right knee  M25 561                   Assessment  Assessment details: Patient is a 59y o  year old male who attended physical therapy for 20 treatment sessions s/p right quadriceps tendon repair on 2022  Patient reports significant improvement at this time which correlates with FOTO scoring  Patient has shown improvement throughout PT by demonstrating decreased pain, increased range of motion, increased strength, improved tolerance to activity and improved gait/balance  Secondary to achieving functional goals and independence with comprehensive Home Exercise Program, Angel Jimenez will be discharged from PT at this time  Thank you  Impairments: abnormal gait, abnormal or restricted ROM, abnormal movement, activity intolerance, impaired balance, impaired physical strength, lacks appropriate home exercise program, pain with function and weight-bearing intolerance  Understanding of Dx/Px/POC: excellent  Goals  STG (6-8 weeks)  1  Patient will be independent with HEP (MET)  2  Decrease pain at worst by 2 points on NPRS (MET)  3  Increase right knee PROM/AROM to WNL (MET)  4  Patient will demonstrate ability to ambulate with normal mechanics and will have weaned from t-scope brace (MET)  LTG (16+ weeks)  1  Decrease pain at worst from 4 points on NPRS (MET)  2  Patient will demonstrate normal stair mechanics with a reciprocal pattern (MET)  3  Increase R = L quad strength for improved ADL and leisure function (NOT MET)  4  Patient will demonstrate ability to perform single leg hop test 90% equal to the uninvolved LE (NOT TESTED)  5   Increase FOTO > or equal to expected outcome (MET)    Plan  Plan details: D/C  Patient would benefit from: skilled PT  Planned therapy interventions: joint mobilization, manual therapy, patient education, neuromuscular re-education, strengthening, stretching, therapeutic activities, therapeutic exercise, transfer training, home exercise program, functional ROM exercises, balance/weight bearing training and ADL training  Treatment plan discussed with: patient        Subjective Evaluation    History of Present Illness  Mechanism of injury: Patient presents to outpatient PT s/p right quadriceps tendon repair on 05/05/2022  Patient states that the injury occurred when he was draining his pool with a sump pump and tripped when he saw the pump falling into the pool  Since surgery, patient was placed in a t-scope brace locked into extension with and non weight bearing until starting PT  Patient notes that his pain is currently worse with mobility/ambulation and improved with rest, ice and medications  Patient is retired but has difficulty performing his normal ADL's and leisure functions (golf and home renovations) as a result  Patients goals for PT are to decrease the pain, increased strength and improve ROM for return to PLOF      6/16/2022: Patient is now 6 weeks post-op and notes no pain or difficulty with general mobility and ADL's  Will begin opening the t-scope brace for ambulation and functional mobility per protocol as patient is demonstrating sufficient quad activation/control and mobility  Patient has been compliant with his HEP throughout  Per patient request, we will begin visits 1x/wk with frequent updates of his HEP secondary to high copay at 8 weeks post-op  7/28/2022: Patient reports that his pain has remained mostly resolved with the exception of days when he is doing more yard work or kneeling  Patient continues to exercise regularly and has returned to a gym routine    Patient wishes to discontinue PT and progress independently at this time secondary to high co-pay  Patient understands progressions and that he can call with any questions upon discharge  Not a recurrent problem   Quality of life: excellent    Pain  Current pain ratin  At best pain ratin  At worst pain ratin  Quality: dull ache, burning and radiating  Relieving factors: rest, relaxation and medications  Aggravating factors: standing and walking  Progression: worsening    Social Support  Steps to enter house: yes  Stairs in house: yes   Lives in: multiple-level home  Lives with: spouse    Employment status: not working  Hand dominance: right      Diagnostic Tests  MRI studies: abnormal  Treatments  Current treatment: immobilization and medication  Patient Goals  Patient goals for therapy: increased strength, independence with ADLs/IADLs, return to sport/leisure activities, increased motion, decreased pain, improved balance and decreased edema          Objective     Observations     Right Knee   Negative for edema       Additional Observation Details  Low risk for DVT per Wells criteria    (-) pitting edema    - Incision is clean, dry and without exudate    Neurological Testing     Sensation     Knee     Right Knee   Intact: light touch     Active Range of Motion   Left Knee   Flexion: 135 degrees   Extension: 0 degrees     Right Knee   Flexion: 125 degrees   Extension: 3 degrees     Passive Range of Motion   Left Knee   Flexion: 140 degrees   Extension: 0 degrees     Right Knee   Flexion: 130 degrees   Extension: 0 degrees     Mobility   Patellar Mobility:     Right Knee   Hypomobile: medial, lateral, superior and inferior     Strength/Myotome Testing     Left Knee   Flexion: 5  Extension: 5  Quadriceps contraction: good    Right Knee   Flexion: 4+  Extension: 4+  Quadriceps contraction: good    Swelling     Left Knee Girth Measurement (cm)   10 cm above joint line: 38 cm  10 cm below joint line: 34 5 cm    Right Knee Girth Measurement (cm)   10 cm above joint line: 39 cm  10 cm below joint line: 37 cm    Ambulation     Ambulation: Level Surfaces     Additional Level Surfaces Ambulation Details  Patient using 4 pt walker with non weight bearing of the right LE prior to evaluation  Following gait training, patient able to demonstrate improved weight bearing of the right LE with a step to pattern using the 4pt walker  6/16/2022: No AD with t-scope brace opened to 45° of knee flexion and equal step/stride length noted    (7/28/2022: Normal step and stride length with normal gait speed and weight bearing    Mild reduction in terminal knee extension during stance on the right)      Ambulation: Stairs   Ascend stairs: independent  Pattern: reciprocal  Railings: without rails  Descend stairs: independent  Pattern: reciprocal  Railings: without rails             Precautions: HTN, Chronic Pain, GERD, s/p right quadriceps tendon repair on 05/05/2022 WBAT    Manuals 6/13 6/16 6/20 6/23 6/27 6/30 7/7 7/14 7/21 7/28   R knee PROM to tolerance (brace now open to 60°, 90° week 7, out by week 8) 5' 5' [facility] EH 5' [facility] 5' 5' 5'    Patellar mobilizations G4 G4 PROM - EH PROM - EH G4 PROM - EH G4 G4 G4    R medial knee STM 4' 4' [facility] EH         Re-eval  10'        25'   Gentle quad stretching       4x30" 4x30" 4x30"    Neuro Re-Ed             SLS on foam  4x30" 30"x4 30"x4 4x30" 30"x3 3x30" 3x30" 3x30"    SLS hip abduction  3x10 B/L 3x10  bilat 3x10  bilat 3x10 B/L 3x10  bilat 3x10 B/L 3x10 B/L 3x10 B/L    Y balance       x5 x5 To fatigue    BOSU ball mini squats        2x6 2x10    RDL         5#  2x10                              Ther Ex             R heel slides 2x10 x5" 2x10 x5" 5" hold, 2x10 5" hold, 2x10 2x10 x5" 5" hold, 2x10       Biodex Maze L3  x2 L3  x2 L3  x2 L3  x2 L3  x2 L3  x2       Supine SLR x3 - no brace x3 sets to fatigue x3 sets to fatigue x3 sets to fatigue x3 sets to fatigue x3 sets to fatigue x3 sets to fatigue       Foam balance w/ ball toss GMB 3x10 GMB  3x10 RMB  3x10 RMB  3x10 RMB  3x10 RMB  3x10       HS isometric 3x10 x3" 3x10 x3"  3"  hold,    3x10  3" hold,    3x10 3x10 x3"        Standing heel raises/toe raises 3x10 ea  3x10  ea  2x20   ea  2x20   ea 2x20 ea    2x20   ea       Recumbent bike  L1  5'  5 min   L1  5 min   L1 L1  5'  5 min   L1 L1  5' L1  5' L1  5' L1  5'   Elliptical        L1  5' L1  5' L1  5' L1  5'   Monster walks  NV  NV  25 ft   x4 25 ft  x4  25 ft   x4 25 ft x3 OTB      Mini squats  3x10  3x10  3x10 3x10  3x10 3x10 to low mat      Leg press (45-0° of knee flexion)  45#  3x15  Single leg   45#   3x10  Single leg   45#   3x10 Single leg 45#  3x10  Single leg   45#   3x10 55#  3x10      Step ups  NV  NV  2x10 2x10 6"  6"   2x10       Lateral step down       4"  3x10 4"   3x10 4"  3x10    Pball HS curl in supine       3x10      Side steps on step        4x30" 3x10    Re-eval          10'                             Ther Activity             Stair and gait training                          Gait Training                                       Modalities

## 2022-08-08 DIAGNOSIS — I10 ESSENTIAL HYPERTENSION: ICD-10-CM

## 2022-08-08 RX ORDER — LOSARTAN POTASSIUM 100 MG/1
100 TABLET ORAL DAILY
Qty: 90 TABLET | Refills: 0 | Status: SHIPPED | OUTPATIENT
Start: 2022-08-08

## 2022-09-20 DIAGNOSIS — M25.50 ARTHRALGIA, UNSPECIFIED JOINT: ICD-10-CM

## 2022-09-20 RX ORDER — MELOXICAM 15 MG/1
15 TABLET ORAL DAILY
Qty: 30 TABLET | Refills: 0 | Status: SHIPPED | OUTPATIENT
Start: 2022-09-20

## 2022-10-04 DIAGNOSIS — K21.9 GASTROESOPHAGEAL REFLUX DISEASE: ICD-10-CM

## 2022-10-04 RX ORDER — ESOMEPRAZOLE MAGNESIUM 40 MG/1
40 CAPSULE, DELAYED RELEASE ORAL DAILY
Qty: 90 CAPSULE | Refills: 0 | Status: SHIPPED | OUTPATIENT
Start: 2022-10-04

## 2022-10-22 DIAGNOSIS — M25.511 CHRONIC PAIN OF BOTH SHOULDERS: ICD-10-CM

## 2022-10-22 DIAGNOSIS — G89.29 CHRONIC PAIN OF BOTH SHOULDERS: ICD-10-CM

## 2022-10-22 DIAGNOSIS — M25.512 CHRONIC PAIN OF BOTH SHOULDERS: ICD-10-CM

## 2022-10-24 RX ORDER — CYCLOBENZAPRINE HCL 10 MG
10 TABLET ORAL 3 TIMES DAILY PRN
Qty: 30 TABLET | Refills: 0 | Status: SHIPPED | OUTPATIENT
Start: 2022-10-24

## 2022-11-02 ENCOUNTER — VBI (OUTPATIENT)
Dept: ADMINISTRATIVE | Facility: OTHER | Age: 62
End: 2022-11-02

## 2022-11-09 DIAGNOSIS — I10 ESSENTIAL HYPERTENSION: ICD-10-CM

## 2022-11-09 RX ORDER — LOSARTAN POTASSIUM 100 MG/1
100 TABLET ORAL DAILY
Qty: 90 TABLET | Refills: 0 | Status: SHIPPED | OUTPATIENT
Start: 2022-11-09

## 2022-12-28 DIAGNOSIS — M25.512 CHRONIC PAIN OF BOTH SHOULDERS: ICD-10-CM

## 2022-12-28 DIAGNOSIS — G89.29 CHRONIC PAIN OF BOTH SHOULDERS: ICD-10-CM

## 2022-12-28 DIAGNOSIS — M25.50 ARTHRALGIA, UNSPECIFIED JOINT: ICD-10-CM

## 2022-12-28 DIAGNOSIS — M25.511 CHRONIC PAIN OF BOTH SHOULDERS: ICD-10-CM

## 2022-12-28 RX ORDER — MELOXICAM 15 MG/1
15 TABLET ORAL DAILY
Qty: 30 TABLET | Refills: 0 | Status: SHIPPED | OUTPATIENT
Start: 2022-12-28

## 2022-12-28 RX ORDER — CYCLOBENZAPRINE HCL 10 MG
10 TABLET ORAL 3 TIMES DAILY PRN
Qty: 30 TABLET | Refills: 0 | Status: SHIPPED | OUTPATIENT
Start: 2022-12-28

## 2023-02-02 DIAGNOSIS — K21.9 GASTROESOPHAGEAL REFLUX DISEASE: ICD-10-CM

## 2023-02-02 DIAGNOSIS — I10 ESSENTIAL HYPERTENSION: ICD-10-CM

## 2023-02-02 RX ORDER — LOSARTAN POTASSIUM 100 MG/1
100 TABLET ORAL DAILY
Qty: 90 TABLET | Refills: 0 | Status: SHIPPED | OUTPATIENT
Start: 2023-02-02

## 2023-02-02 RX ORDER — ESOMEPRAZOLE MAGNESIUM 40 MG/1
40 CAPSULE, DELAYED RELEASE ORAL DAILY
Qty: 90 CAPSULE | Refills: 0 | Status: SHIPPED | OUTPATIENT
Start: 2023-02-02

## 2023-03-13 ENCOUNTER — OFFICE VISIT (OUTPATIENT)
Dept: FAMILY MEDICINE CLINIC | Facility: CLINIC | Age: 63
End: 2023-03-13

## 2023-03-13 VITALS
SYSTOLIC BLOOD PRESSURE: 142 MMHG | OXYGEN SATURATION: 97 % | DIASTOLIC BLOOD PRESSURE: 86 MMHG | BODY MASS INDEX: 26.77 KG/M2 | HEART RATE: 98 BPM | WEIGHT: 187 LBS | HEIGHT: 70 IN | TEMPERATURE: 98.3 F

## 2023-03-13 DIAGNOSIS — Z13.1 SCREENING FOR DIABETES MELLITUS: ICD-10-CM

## 2023-03-13 DIAGNOSIS — R00.2 INTERMITTENT PALPITATIONS: Primary | ICD-10-CM

## 2023-03-13 DIAGNOSIS — Z13.220 SCREENING FOR LIPID DISORDERS: ICD-10-CM

## 2023-03-13 DIAGNOSIS — Z12.5 SCREENING FOR PROSTATE CANCER: ICD-10-CM

## 2023-03-13 NOTE — PROGRESS NOTES
Name: Helen Hopson      : 1960      MRN: [id number]  Encounter Provider: Guillermo Oropeza DO  Encounter Date: 3/13/2023   Encounter department: [address] [place] [address]  Intermittent palpitations  Assessment & Plan:  Symptoms consistent with his history of intermittent palpitations  Prior echo and Holter monitors with no evidence of malignant rhythm  Recommend recheck of blood work  As long as he has no symptoms with exertion including chest pain shortness of breath or dizziness we will hold off on referral   If symptoms worsen would consider recheck with cardiology  Orders:  -     TSH, 3rd generation; Future; Expected date: 2023    2  Screening for prostate cancer  -     PSA, Total Screen; Future; Expected date: 2023    3  Screening for lipid disorders  -     Lipid Panel with Direct LDL reflex; Future; Expected date: 2023    4  Screening for diabetes mellitus  -     Comprehensive metabolic panel; Future; Expected date: 2023           Subjective      Patient presents with a chief complaint of intermittent palpitations over the last 1 to 2 weeks  Symptoms occur primarily when at rest or in bed  He exercises regularly at the gym doing cardio and strenuous [facility]   He has no chest pain shortness of breath dizziness or palpitations while working out  Had similar symptoms approximately 18 months ago and had work-up with cardiology including Holter monitor and echocardiogram which was unremarkable with the exception of PVCs noted on his Holter monitor but not to an excessive level  Review of Systems   Constitutional: Negative  Respiratory: Negative  Cardiovascular: Positive for palpitations  Gastrointestinal: Negative  Genitourinary: Negative  Musculoskeletal: Negative  Psychiatric/Behavioral: Negative          Current Outpatient Medications on File Prior to Visit   Medication Sig   • cyclobenzaprine (FLEXERIL) 10 mg tablet Take 1 tablet (10 mg total) by mouth 3 (three) times a day as needed for muscle spasms   • esomeprazole (NexIUM) 40 MG capsule Take 1 capsule (40 mg total) by mouth daily   • losartan (COZAAR) 100 MG tablet Take 1 tablet (100 mg total) by mouth daily Need appointment   • meloxicam (MOBIC) 15 mg tablet Take 1 tablet (15 mg total) by mouth daily   • Multiple Vitamin (MULTIVITAMIN) tablet Take 1 tablet by mouth daily   • [DISCONTINUED] fluticasone (FLONASE) 50 mcg/act nasal spray 1 spray into each nostril daily (Patient not taking: Reported on 8/3/2021)   • [DISCONTINUED] loratadine (CLARITIN) 10 mg tablet Take 1 tablet (10 mg total) by mouth daily (Patient not taking: Reported on 2/27/2020)       Objective     /86   Pulse 98   Temp 98 3 °F (36 8 °C)   Ht 5' 9 69" (1 77 m)   Wt 84 8 kg (187 lb)   SpO2 97%   BMI 27 07 kg/m²     Physical Exam  Vitals and nursing note reviewed  Constitutional:       General: He is not in acute distress  Appearance: He is well-developed  He is not diaphoretic  HENT:      Head: Normocephalic and atraumatic  Eyes:      General:         Right eye: No discharge  Conjunctiva/sclera: Conjunctivae normal       Pupils: Pupils are equal, round, and reactive to light  Neck:      Thyroid: No thyromegaly  Cardiovascular:      Rate and Rhythm: Normal rate and regular rhythm  Pulmonary:      Effort: Pulmonary effort is normal  No respiratory distress  Breath sounds: Normal breath sounds  Musculoskeletal:      Cervical back: Normal range of motion  Lymphadenopathy:      Cervical: No cervical adenopathy  Skin:     General: Skin is warm and dry  Neurological:      Mental Status: He is alert and oriented to person, place, and time  Psychiatric:         Behavior: Behavior normal          Thought Content:  Thought content normal          Judgment: Judgment normal        Kostas Glez DO

## 2023-03-13 NOTE — ASSESSMENT & PLAN NOTE
Symptoms consistent with his history of intermittent palpitations  Prior echo and Holter monitors with no evidence of malignant rhythm  Recommend recheck of blood work  As long as he has no symptoms with exertion including chest pain shortness of breath or dizziness we will hold off on referral   If symptoms worsen would consider recheck with cardiology

## 2023-03-29 DIAGNOSIS — G89.29 CHRONIC PAIN OF BOTH SHOULDERS: ICD-10-CM

## 2023-03-29 DIAGNOSIS — M25.512 CHRONIC PAIN OF BOTH SHOULDERS: ICD-10-CM

## 2023-03-29 DIAGNOSIS — M25.511 CHRONIC PAIN OF BOTH SHOULDERS: ICD-10-CM

## 2023-03-30 RX ORDER — CYCLOBENZAPRINE HCL 10 MG
10 TABLET ORAL 3 TIMES DAILY PRN
Qty: 30 TABLET | Refills: 0 | Status: SHIPPED | OUTPATIENT
Start: 2023-03-30

## 2023-04-25 ENCOUNTER — APPOINTMENT (OUTPATIENT)
Dept: LAB | Facility: CLINIC | Age: 63
End: 2023-04-25

## 2023-04-25 DIAGNOSIS — Z13.220 SCREENING FOR LIPID DISORDERS: ICD-10-CM

## 2023-04-25 DIAGNOSIS — Z12.5 SCREENING FOR PROSTATE CANCER: ICD-10-CM

## 2023-04-25 DIAGNOSIS — Z13.1 SCREENING FOR DIABETES MELLITUS: ICD-10-CM

## 2023-04-25 DIAGNOSIS — R00.2 INTERMITTENT PALPITATIONS: ICD-10-CM

## 2023-04-25 LAB
ALBUMIN SERPL BCP-MCNC: 4.1 G/DL (ref 3.5–5)
ALP SERPL-CCNC: 52 U/L (ref 46–116)
ALT SERPL W P-5'-P-CCNC: 54 U/L (ref 12–78)
ANION GAP SERPL CALCULATED.3IONS-SCNC: 3 MMOL/L (ref 4–13)
AST SERPL W P-5'-P-CCNC: 33 U/L (ref 5–45)
BILIRUB SERPL-MCNC: 1 MG/DL (ref 0.2–1)
BUN SERPL-MCNC: 24 MG/DL (ref 5–25)
CALCIUM SERPL-MCNC: 9.4 MG/DL (ref 8.3–10.1)
CHLORIDE SERPL-SCNC: 104 MMOL/L (ref 96–108)
CHOLEST SERPL-MCNC: 147 MG/DL
CO2 SERPL-SCNC: 29 MMOL/L (ref 21–32)
CREAT SERPL-MCNC: 1.16 MG/DL (ref 0.6–1.3)
GFR SERPL CREATININE-BSD FRML MDRD: 66 ML/MIN/1.73SQ M
GLUCOSE P FAST SERPL-MCNC: 98 MG/DL (ref 65–99)
HDLC SERPL-MCNC: 47 MG/DL
LDLC SERPL CALC-MCNC: 80 MG/DL (ref 0–100)
POTASSIUM SERPL-SCNC: 4.3 MMOL/L (ref 3.5–5.3)
PROT SERPL-MCNC: 8.2 G/DL (ref 6.4–8.4)
PSA SERPL-MCNC: 0.7 NG/ML (ref 0–4)
SODIUM SERPL-SCNC: 136 MMOL/L (ref 135–147)
TRIGL SERPL-MCNC: 99 MG/DL
TSH SERPL DL<=0.05 MIU/L-ACNC: 3.23 UIU/ML (ref 0.45–4.5)

## 2023-05-08 DIAGNOSIS — M25.511 CHRONIC PAIN OF BOTH SHOULDERS: ICD-10-CM

## 2023-05-08 DIAGNOSIS — M25.512 CHRONIC PAIN OF BOTH SHOULDERS: ICD-10-CM

## 2023-05-08 DIAGNOSIS — G89.29 CHRONIC PAIN OF BOTH SHOULDERS: ICD-10-CM

## 2023-05-08 DIAGNOSIS — M25.50 ARTHRALGIA, UNSPECIFIED JOINT: ICD-10-CM

## 2023-05-09 RX ORDER — CYCLOBENZAPRINE HCL 10 MG
10 TABLET ORAL 3 TIMES DAILY PRN
Qty: 30 TABLET | Refills: 0 | Status: SHIPPED | OUTPATIENT
Start: 2023-05-09

## 2023-05-09 RX ORDER — MELOXICAM 15 MG/1
15 TABLET ORAL DAILY
Qty: 30 TABLET | Refills: 0 | Status: SHIPPED | OUTPATIENT
Start: 2023-05-09

## 2023-05-10 DIAGNOSIS — I10 ESSENTIAL HYPERTENSION: ICD-10-CM

## 2023-05-10 RX ORDER — LOSARTAN POTASSIUM 100 MG/1
100 TABLET ORAL DAILY
Qty: 90 TABLET | Refills: 0 | Status: SHIPPED | OUTPATIENT
Start: 2023-05-10

## 2023-07-17 DIAGNOSIS — G89.29 CHRONIC PAIN OF BOTH SHOULDERS: ICD-10-CM

## 2023-07-17 DIAGNOSIS — M25.511 CHRONIC PAIN OF BOTH SHOULDERS: ICD-10-CM

## 2023-07-17 DIAGNOSIS — M25.512 CHRONIC PAIN OF BOTH SHOULDERS: ICD-10-CM

## 2023-07-18 RX ORDER — CYCLOBENZAPRINE HCL 10 MG
10 TABLET ORAL 3 TIMES DAILY PRN
Qty: 30 TABLET | Refills: 0 | Status: SHIPPED | OUTPATIENT
Start: 2023-07-18

## 2023-08-04 DIAGNOSIS — I10 ESSENTIAL HYPERTENSION: ICD-10-CM

## 2023-08-04 RX ORDER — LOSARTAN POTASSIUM 100 MG/1
100 TABLET ORAL DAILY
Qty: 90 TABLET | Refills: 0 | Status: SHIPPED | OUTPATIENT
Start: 2023-08-04

## 2023-08-29 DIAGNOSIS — M25.511 CHRONIC PAIN OF BOTH SHOULDERS: ICD-10-CM

## 2023-08-29 DIAGNOSIS — M25.512 CHRONIC PAIN OF BOTH SHOULDERS: ICD-10-CM

## 2023-08-29 DIAGNOSIS — M25.50 ARTHRALGIA, UNSPECIFIED JOINT: ICD-10-CM

## 2023-08-29 DIAGNOSIS — G89.29 CHRONIC PAIN OF BOTH SHOULDERS: ICD-10-CM

## 2023-08-29 RX ORDER — CYCLOBENZAPRINE HCL 10 MG
10 TABLET ORAL 3 TIMES DAILY PRN
Qty: 30 TABLET | Refills: 0 | Status: SHIPPED | OUTPATIENT
Start: 2023-08-29

## 2023-08-29 RX ORDER — MELOXICAM 15 MG/1
15 TABLET ORAL DAILY
Qty: 30 TABLET | Refills: 0 | Status: SHIPPED | OUTPATIENT
Start: 2023-08-29

## (undated) DEVICE — PROXIMATE SKIN STAPLERS (35 WIDE) CONTAINS 35 STAINLESS STEEL STAPLES (FIXED HEAD): Brand: PROXIMATE

## (undated) DEVICE — 3000CC GUARDIAN II: Brand: GUARDIAN

## (undated) DEVICE — ARTHROSCOPY FLOOR MAT

## (undated) DEVICE — SAW BLADE OSCILLATING BRAZOL 167

## (undated) DEVICE — SKIN MARKER DUAL TIP WITH RULER CAP, FLEXIBLE RULER AND LABELS: Brand: DEVON

## (undated) DEVICE — 4.0 MM X 2.35 MM X 70.0 MM OVAL CARBIDE BUR, 8 FLUTE

## (undated) DEVICE — REM POLYHESIVE ADULT PATIENT RETURN ELECTRODE: Brand: VALLEYLAB

## (undated) DEVICE — SURGICAL GOWN, XL SMARTSLEEVE: Brand: CONVERTORS

## (undated) DEVICE — WET SKIN PREP TRAY: Brand: MEDLINE INDUSTRIES, INC.

## (undated) DEVICE — GAUZE SPONGES,16 PLY: Brand: CURITY

## (undated) DEVICE — BIPOLAR SEALER 23-113-1 AQM 2.3: Brand: AQUAMANTYS™

## (undated) DEVICE — GLOVE SRG BIOGEL 8

## (undated) DEVICE — SUT VICRYL 2-0 CT-1 36 IN J945H

## (undated) DEVICE — GLOVE SRG BIOGEL 8.5

## (undated) DEVICE — HEX-LOCKING BLADE ELECTRODE: Brand: EDGE

## (undated) DEVICE — ADHESIVE SKN CLSR HISTOACRYL FLEX 0.5ML LF

## (undated) DEVICE — SCD SEQUENTIAL COMPRESSION COMFORT SLEEVE MEDIUM KNEE LENGTH: Brand: KENDALL SCD

## (undated) DEVICE — TIBURON SPLIT SHEET: Brand: CONVERTORS

## (undated) DEVICE — WEBRIL 6 IN UNSTERILE

## (undated) DEVICE — VIOLET BRAIDED (POLYGLACTIN 910), SYNTHETIC ABSORBABLE SUTURE: Brand: COATED VICRYL

## (undated) DEVICE — 3M™ TEGADERM™ TRANSPARENT FILM DRESSING FRAME STYLE, 1627, 4 IN X 10 IN (10 CM X 25 CM), 20/CT 4CT/CASE: Brand: 3M™ TEGADERM™

## (undated) DEVICE — THE SIMPULSE SOLO SYSTEM WITH ULTREX RETRACTABLE SPLASH SHIELD TIP: Brand: SIMPULSE SOLO

## (undated) DEVICE — HEAVY DUTY TABLE COVER: Brand: CONVERTORS

## (undated) DEVICE — PREP SURGICAL PURPREP 26ML

## (undated) DEVICE — INTENDED FOR TISSUE SEPARATION, AND OTHER PROCEDURES THAT REQUIRE A SHARP SURGICAL BLADE TO PUNCTURE OR CUT.: Brand: BARD-PARKER SAFETY BLADES SIZE 10, STERILE

## (undated) DEVICE — CHLORAPREP HI-LITE 26ML ORANGE

## (undated) DEVICE — DRAPE SHEET X-LG

## (undated) DEVICE — POSITIONER HANA TABLE PACK

## (undated) DEVICE — TRAY FOLEY 16FR URIMETER SURESTEP

## (undated) DEVICE — INTENDED FOR TISSUE SEPARATION, AND OTHER PROCEDURES THAT REQUIRE A SHARP SURGICAL BLADE TO PUNCTURE OR CUT.: Brand: BARD-PARKER ® CARBON RIB-BACK BLADES

## (undated) DEVICE — STRL ALLENTOWN HIP SHOULDER PK: Brand: CARDINAL HEALTH

## (undated) DEVICE — HOOD: Brand: FLYTE, SURGICOOL

## (undated) DEVICE — HOOD: Brand: FLYTE

## (undated) DEVICE — DRAPE C-ARM X-RAY

## (undated) DEVICE — CAPIT HIP COP -CERAMIC ON POLY

## (undated) DEVICE — VEST SURGEON DISPOSABLE

## (undated) DEVICE — NEEDLE 18 G X 1 1/2

## (undated) DEVICE — GLOVE SRG BIOGEL 7.5

## (undated) DEVICE — GLOVE INDICATOR PI UNDERGLOVE SZ 8 BLUE

## (undated) DEVICE — MAYO STAND COVER: Brand: CONVERTORS

## (undated) DEVICE — 10FR FRAZIER SUCTION HANDLE: Brand: CARDINAL HEALTH

## (undated) DEVICE — INSTRUMENT POUCH: Brand: CONVERTORS

## (undated) DEVICE — COBAN 6 IN STERILE

## (undated) DEVICE — SILVER-COATED ANTIMICROBIAL BARRIER DRESSING: Brand: ACTICOAT FLEX7 4" X 5"

## (undated) DEVICE — GLOVE SRG BIOGEL ORTHOPEDIC 7.5

## (undated) DEVICE — GLOVE INDICATOR PI UNDERGLOVE SZ 8.5 BLUE

## (undated) DEVICE — SYRINGE 30ML LL

## (undated) DEVICE — BIOLOX DELTA TS CERAMIC FEMORAL HEAD 12/14 TAPER REVISION DIAMETER 36MM +5
Type: IMPLANTABLE DEVICE | Site: HIP | Status: NON-FUNCTIONAL
Brand: BIOLOX DELTA

## (undated) DEVICE — ELECTRODE ELECTROSURGICAL EDGE PTFE STANDARD L6.5 IN OD3/32 TEFLON COATED

## (undated) DEVICE — GAMMEX® NON-LATEX SENSITIVE SIZE 7.5, STERILE NEOPRENE POWDER-FREE SURGICAL GLOVE: Brand: GAMMEX

## (undated) DEVICE — JP 3-SPRING RES W/10FR PVC DRAIN/TR: Brand: CARDINAL HEALTH